# Patient Record
Sex: MALE | Race: WHITE | NOT HISPANIC OR LATINO | Employment: OTHER | URBAN - METROPOLITAN AREA
[De-identification: names, ages, dates, MRNs, and addresses within clinical notes are randomized per-mention and may not be internally consistent; named-entity substitution may affect disease eponyms.]

---

## 2019-01-21 LAB — HBA1C MFR BLD HPLC: 5.6 %

## 2019-03-08 ENCOUNTER — TELEPHONE (OUTPATIENT)
Dept: NEUROLOGY | Facility: CLINIC | Age: 84
End: 2019-03-08

## 2019-04-25 ENCOUNTER — CONSULT (OUTPATIENT)
Dept: NEUROLOGY | Facility: CLINIC | Age: 84
End: 2019-04-25
Payer: COMMERCIAL

## 2019-04-25 VITALS
DIASTOLIC BLOOD PRESSURE: 65 MMHG | SYSTOLIC BLOOD PRESSURE: 152 MMHG | HEART RATE: 60 BPM | HEIGHT: 68 IN | BODY MASS INDEX: 30.31 KG/M2 | WEIGHT: 200 LBS

## 2019-04-25 DIAGNOSIS — G25.0 TREMOR, ESSENTIAL: Primary | ICD-10-CM

## 2019-04-25 PROBLEM — M19.039 LOCALIZED PRIMARY OSTEOARTHRITIS OF WRIST: Status: ACTIVE | Noted: 2019-04-25

## 2019-04-25 PROBLEM — G56.00 CARPAL TUNNEL SYNDROME: Status: ACTIVE | Noted: 2017-10-05

## 2019-04-25 PROBLEM — M19.012 PRIMARY OSTEOARTHRITIS OF LEFT SHOULDER: Status: ACTIVE | Noted: 2019-04-25

## 2019-04-25 PROBLEM — M77.10 LATERAL EPICONDYLITIS: Status: ACTIVE | Noted: 2019-04-25

## 2019-04-25 PROBLEM — I10 HYPERTENSION: Status: ACTIVE | Noted: 2019-04-25

## 2019-04-25 PROBLEM — M47.817 LUMBOSACRAL SPONDYLOSIS WITHOUT MYELOPATHY: Status: ACTIVE | Noted: 2019-04-25

## 2019-04-25 PROBLEM — G56.20 CUBITAL TUNNEL SYNDROME: Status: ACTIVE | Noted: 2017-10-05

## 2019-04-25 PROBLEM — J45.909 ASTHMA: Status: ACTIVE | Noted: 2019-04-25

## 2019-04-25 PROCEDURE — 99205 OFFICE O/P NEW HI 60 MIN: CPT | Performed by: PSYCHIATRY & NEUROLOGY

## 2019-04-25 RX ORDER — CHLORTHALIDONE 25 MG/1
25 TABLET ORAL DAILY
COMMUNITY
Start: 2019-04-21 | End: 2021-07-02

## 2019-04-25 RX ORDER — ASCORBIC ACID 1000 MG
200 TABLET ORAL DAILY
Status: ON HOLD | COMMUNITY
End: 2021-07-17 | Stop reason: CLARIF

## 2019-04-25 RX ORDER — CLONIDINE HYDROCHLORIDE 0.1 MG/1
0.1 TABLET ORAL
COMMUNITY
Start: 2019-02-22 | End: 2021-08-06 | Stop reason: HOSPADM

## 2019-04-25 RX ORDER — OXYCODONE HYDROCHLORIDE 10 MG/1
10 TABLET ORAL EVERY 4 HOURS PRN
COMMUNITY
Start: 2019-02-05

## 2019-04-25 RX ORDER — AMOXICILLIN 500 MG/1
CAPSULE ORAL
COMMUNITY
Start: 2019-02-05 | End: 2019-07-31 | Stop reason: ALTCHOICE

## 2019-04-25 RX ORDER — CYANOCOBALAMIN (VITAMIN B-12) 500 MCG
800 LOZENGE ORAL DAILY
Status: ON HOLD | COMMUNITY
End: 2021-07-17 | Stop reason: CLARIF

## 2019-04-25 RX ORDER — SIMVASTATIN 10 MG
10 TABLET ORAL
COMMUNITY
Start: 2019-03-31

## 2019-04-25 RX ORDER — ECHINACEA 400 MG
1 CAPSULE ORAL DAILY
COMMUNITY

## 2019-04-25 RX ORDER — LEVOTHYROXINE SODIUM 0.07 MG/1
75 TABLET ORAL DAILY
COMMUNITY

## 2019-04-25 RX ORDER — FUROSEMIDE 20 MG/1
20 TABLET ORAL DAILY
COMMUNITY
Start: 2019-03-31 | End: 2021-07-17 | Stop reason: HOSPADM

## 2019-04-25 RX ORDER — AMLODIPINE BESYLATE 10 MG/1
10 TABLET ORAL DAILY
COMMUNITY
Start: 2019-04-21 | End: 2021-08-06 | Stop reason: HOSPADM

## 2019-04-25 RX ORDER — CARVEDILOL 6.25 MG/1
6.25 TABLET ORAL 2 TIMES DAILY WITH MEALS
COMMUNITY
Start: 2019-04-23 | End: 2021-08-06 | Stop reason: HOSPADM

## 2019-04-25 RX ORDER — AMPICILLIN TRIHYDRATE 250 MG
600 CAPSULE ORAL
Status: ON HOLD | COMMUNITY
Start: 2010-06-22 | End: 2021-07-17 | Stop reason: CLARIF

## 2019-04-25 RX ORDER — FLUTICASONE PROPIONATE 250 UG/1
POWDER, METERED RESPIRATORY (INHALATION)
COMMUNITY
Start: 2019-04-11 | End: 2021-07-17 | Stop reason: HOSPADM

## 2019-04-25 RX ORDER — PRIMIDONE 50 MG/1
TABLET ORAL
Refills: 0 | COMMUNITY
Start: 2019-03-20 | End: 2019-06-04 | Stop reason: DRUGHIGH

## 2019-04-26 ENCOUNTER — TELEPHONE (OUTPATIENT)
Dept: NEUROLOGY | Facility: CLINIC | Age: 84
End: 2019-04-26

## 2019-04-26 DIAGNOSIS — G25.0 TREMOR, ESSENTIAL: Primary | ICD-10-CM

## 2019-04-26 RX ORDER — PRIMIDONE 50 MG/1
250 TABLET ORAL EVERY 12 HOURS SCHEDULED
Qty: 150 TABLET | Refills: 0 | Status: SHIPPED | OUTPATIENT
Start: 2019-04-26 | End: 2019-06-04 | Stop reason: DRUGHIGH

## 2019-05-02 ENCOUNTER — EVALUATION (OUTPATIENT)
Dept: OCCUPATIONAL THERAPY | Facility: REHABILITATION | Age: 84
End: 2019-05-02
Payer: COMMERCIAL

## 2019-05-02 DIAGNOSIS — G25.0 TREMOR, ESSENTIAL: Primary | ICD-10-CM

## 2019-05-02 PROCEDURE — 97165 OT EVAL LOW COMPLEX 30 MIN: CPT | Performed by: OCCUPATIONAL THERAPIST

## 2019-05-13 ENCOUNTER — HOSPITAL ENCOUNTER (OUTPATIENT)
Dept: RADIOLOGY | Facility: HOSPITAL | Age: 84
Discharge: HOME | End: 2019-05-13
Attending: PSYCHIATRY & NEUROLOGY
Payer: COMMERCIAL

## 2019-05-13 DIAGNOSIS — G25.0 TREMOR, ESSENTIAL: ICD-10-CM

## 2019-05-13 PROCEDURE — 70551 MRI BRAIN STEM W/O DYE: CPT

## 2019-05-17 ENCOUNTER — TELEPHONE (OUTPATIENT)
Dept: NEUROLOGY | Facility: CLINIC | Age: 84
End: 2019-05-17

## 2019-05-17 DIAGNOSIS — G25.0 TREMOR, ESSENTIAL: Primary | ICD-10-CM

## 2019-05-18 RX ORDER — GABAPENTIN 100 MG/1
200 CAPSULE ORAL 3 TIMES DAILY
Qty: 180 CAPSULE | Refills: 3 | Status: SHIPPED | OUTPATIENT
Start: 2019-05-18 | End: 2020-04-24

## 2019-05-30 ENCOUNTER — TELEPHONE (OUTPATIENT)
Dept: NEUROLOGY | Facility: CLINIC | Age: 84
End: 2019-05-30

## 2019-05-30 DIAGNOSIS — G25.0 TREMOR, ESSENTIAL: Primary | ICD-10-CM

## 2019-06-04 RX ORDER — PRIMIDONE 250 MG/1
250 TABLET ORAL EVERY 12 HOURS SCHEDULED
Qty: 60 TABLET | Refills: 3 | Status: SHIPPED | OUTPATIENT
Start: 2019-06-04 | End: 2019-06-07 | Stop reason: SDUPTHER

## 2019-06-07 DIAGNOSIS — G25.0 TREMOR, ESSENTIAL: ICD-10-CM

## 2019-06-07 RX ORDER — PRIMIDONE 250 MG/1
250 TABLET ORAL EVERY 12 HOURS SCHEDULED
Qty: 180 TABLET | Refills: 3 | Status: SHIPPED | OUTPATIENT
Start: 2019-06-07 | End: 2019-07-31 | Stop reason: SDUPTHER

## 2019-06-26 ENCOUNTER — EVALUATION (OUTPATIENT)
Dept: OCCUPATIONAL THERAPY | Facility: REHABILITATION | Age: 84
End: 2019-06-26
Payer: COMMERCIAL

## 2019-06-26 DIAGNOSIS — G25.0 TREMOR, ESSENTIAL: Primary | ICD-10-CM

## 2019-06-26 PROCEDURE — 97168 OT RE-EVAL EST PLAN CARE: CPT | Performed by: OCCUPATIONAL THERAPIST

## 2019-07-02 ENCOUNTER — OFFICE VISIT (OUTPATIENT)
Dept: OCCUPATIONAL THERAPY | Facility: REHABILITATION | Age: 84
End: 2019-07-02
Payer: COMMERCIAL

## 2019-07-02 DIAGNOSIS — G25.0 TREMOR, ESSENTIAL: Primary | ICD-10-CM

## 2019-07-02 PROCEDURE — 97112 NEUROMUSCULAR REEDUCATION: CPT

## 2019-07-02 PROCEDURE — 97150 GROUP THERAPEUTIC PROCEDURES: CPT

## 2019-07-02 NOTE — PROGRESS NOTES
Daily Note     Today's date: 2019  Patient name: Hugh Olvera  : 1935  MRN: [id number]  Referring provider: Shelby Ngo MD  Dx:   Encounter Diagnosis   Name Primary?  Tremor, essential Yes       Start Time: 845  Stop Time: 945  Total time in clinic (min): 60 minutes    Subjective: "I shoot my gun with my right hand and support it with my left"  Objective: See treatment diary below  Time trials utilizing 12 coins from right to left Q,D,N,P left hand followed by right hand  Left hand timed with number of coins retrieved in 30 sec, right hand timed with amount f time it took to retrive coins  Dowel placement using left hand w/HG followed by peg placement using right hand w/hand to target demands  Assessment: Time trials using coins L-1)9coins/30sec, 2), 3), 4)10/30,   Increased tremor to left UE noted with task  R- 1)12coins/25sec, 2), 3), 4) 5)   Mod droppage noted for dowel placement due to increase in BLUE tremors  Pt reported frequent droppage when loading bullets using left hand  ADRIAN recommended finger  for D1&2 to improve grasp and decrease droppage and frustration during task  Tolerated treatment well  Patient would benefit from continued OT  Plan: Continued skilled OT per POC focusing on carryover with finger  for increased engagement in meaningful occupation  INTERVENTION COMMENTS:  Diagnosis: Tremor, essential  Precautions: N/A  FOTO: 71, with 0% limitation in HAnd function and 50% limitation in UE function    76, with 50% limitation in UE function and 10% limitation in Hand function  Insurance: Payor: Robert Mendez  REP / Plan: [facility] REP / Product Type: Medicare PPO /   2 of 10 visits, PN due 2019:  [id number]:1   [id number]

## 2019-07-03 ENCOUNTER — OFFICE VISIT (OUTPATIENT)
Dept: OCCUPATIONAL THERAPY | Facility: REHABILITATION | Age: 84
End: 2019-07-03
Payer: COMMERCIAL

## 2019-07-03 DIAGNOSIS — G25.0 TREMOR, ESSENTIAL: Primary | ICD-10-CM

## 2019-07-03 PROCEDURE — 97110 THERAPEUTIC EXERCISES: CPT

## 2019-07-03 PROCEDURE — 97112 NEUROMUSCULAR REEDUCATION: CPT

## 2019-07-03 NOTE — PROGRESS NOTES
Daily Note     Today's date: 7/3/2019  Patient name: Garrison Thompson  : 1935  MRN: [id number]  Referring provider: Annalisa Mckeon MD  Dx: No diagnosis found

## 2019-07-03 NOTE — PROGRESS NOTES
Daily Note     Today's date: 7/3/2019  Patient name: Evan Crump  : 1935  MRN: [id number]  Referring provider: Fam Hancock MD  Dx:   Encounter Diagnosis   Name Primary?  Tremor, essential Yes                  Subjective: Every time I try to load my guns, I spill the ammunition on the ground        Objective: See treatment diary below  Supine therapeutic exercise utilizing #3 Tbar transitioned to #4--x3 sets of 15 reps of shoulder FF, chest press, and prograde/retrograde circumduction with focus on increased B/L shoulder strength/stability, muscle endurance, and B/L coordination  Simulated barrel loading--Pt retrieved small pegs utilizing L hand for placement into cylindrical containers  Activity advancement by donning 1 lb wrist weight to Pts LUE for tremor reduction  Activity with focus on FMC/prehension and hand to target accuracy  Donned K-Tape with helical technique to Pts LUE for increased proprioception      Assessment: Tolerated treatment well  Patient would benefit from continued OT  Pt self report of L shoulder pain following Tbar exercises 2* to PMH of rotator cuff injury  Pt demonstrated Mod difficulty with simulated barrel loading 2* to decreased FMC/prehension, decreased hand to target accuracy, and LUE tremors evident of frequent droppage  Pt demonstrated a decrease in LUE tremors with added wrist weight evident of less droppage during activity  Plan: Continued skilled OT per POC    INTERVENTION COMMENTS:  Diagnosis: Tremor, essential  Precautions: N/A  FOTO: 71, with 0% limitation in HAnd function and 50% limitation in UE function    76, with 50% limitation in UE function and 10% limitation in Hand function  Insurance: Payor: TRISTAN ESTRELLA REP / Plan: [facility] RE[facility] / Product Type: Medicare PPO /   3 of 10 visits, PN due 2019

## 2019-07-08 ENCOUNTER — TELEPHONE (OUTPATIENT)
Dept: NEUROLOGY | Facility: CLINIC | Age: 84
End: 2019-07-08

## 2019-07-08 NOTE — TELEPHONE ENCOUNTER
So the primidone  250mg BID is not helping very well  The reason we hesitated to start topiramate was because of his sulfa allergy and topiramate is related to the sulfa-class drugs  Even through there is no direct relation, but I wanted to make him aware of the risk potentially  If he's still willing, we could do low doses of it for longer times to see if he has any reaction / benefit  Please let me know  Alternative is Sinemet trial for possible dystonia and abandoning the essential tremor treatment route

## 2019-07-08 NOTE — TELEPHONE ENCOUNTER
Spoke with the patient regarding lab results  He made verbal understanding and will stop on magnesium supplements  He stated that he is currently on primidone and not working for him  Patient wanted to know if he could get off of primidone and try topamax? He would like to see if he can get a 50mg qith 30 days supply   Please review and assist

## 2019-07-10 ENCOUNTER — OFFICE VISIT (OUTPATIENT)
Dept: OCCUPATIONAL THERAPY | Facility: REHABILITATION | Age: 84
End: 2019-07-10
Payer: COMMERCIAL

## 2019-07-10 DIAGNOSIS — G25.0 TREMOR, ESSENTIAL: Primary | ICD-10-CM

## 2019-07-10 PROCEDURE — 97112 NEUROMUSCULAR REEDUCATION: CPT | Performed by: OCCUPATIONAL THERAPIST

## 2019-07-10 PROCEDURE — 97535 SELF CARE MNGMENT TRAINING: CPT | Performed by: OCCUPATIONAL THERAPIST

## 2019-07-10 NOTE — TELEPHONE ENCOUNTER
Spoke with the patient he said he will think about it and wait for patient next appointment with Dr Clance Runner  7/31/19 and discuss medication options

## 2019-07-10 NOTE — PROGRESS NOTES
Daily Note     Today's date: 7/10/2019  Patient name: Stacy Baires  : 1935  MRN: [id number]  Referring provider: Mikal Rivero MD  Dx: No diagnosis found  Subjective: "I forgot my glasses in the car  You'll have to help me find the letters and numbers "      Objective: See treatment drescription below    In stance neuro re-ed---writing alphabet in air x2 utilizing #4 Tbar with focus on increased BUE strength/stability, muscle endurance, and B/L coordination  Functional reach crossing midline for marble retrieval utilizing resistive clothespin for placement onto alternating letters and numerals (trail making) with focus on B/L intrinsic  strength, FMC/prehension, and shoulder forward functional reach crossing midline  Pt retrieved rubber bands crossing midline for placement onto geoboard x3 designs with focus on intrinsic strength, hand to target accuracy, B/L coordination, forward functional reach and FMC/prehension  Assessment: Tolerated treatment well  Patient would benefit from continued OT    Pt performed Tbar neuro re-ed below 90* shoulder forward flexion 2* PMH of rotator cuff injuries/surgeries--Pt demonstrated no difficulty during activity  Pt demonstrated Mod difficulty with power web activity 2* decreased hand to target accuracy and decreased FMC/prehension evident of 50% droppage  OTS with requirements to modify task 2* Pt not bringing glasses to tx session  Pt completed 3/3 geoboard designs correctly requiring frequent verbal cues 2* Mod difficulties design replication  Plan: Continue per plan of care  INTERVENTION COMMENTS:  Diagnosis: Tremor, essential  Precautions: N/A  FOTO: 71, with 0% limitation in HAnd function and 50% limitation in UE function    76, with 50% limitation in UE function and 10% limitation in Hand function  Insurance: Payor: [employer]  REP / Plan: Tristan Manley [employer]  [address] REP / Product Type: Medicare PPO /   4 of 10 visits, PN due 07/26/2019    Daily note by CHARISMA Castillo under supervision of Medina Louis MS, OTR/L

## 2019-07-12 ENCOUNTER — OFFICE VISIT (OUTPATIENT)
Dept: OCCUPATIONAL THERAPY | Facility: REHABILITATION | Age: 84
End: 2019-07-12
Payer: COMMERCIAL

## 2019-07-12 DIAGNOSIS — G25.0 TREMOR, ESSENTIAL: Primary | ICD-10-CM

## 2019-07-12 PROCEDURE — 97112 NEUROMUSCULAR REEDUCATION: CPT | Performed by: OCCUPATIONAL THERAPIST

## 2019-07-12 PROCEDURE — 97535 SELF CARE MNGMENT TRAINING: CPT | Performed by: OCCUPATIONAL THERAPIST

## 2019-07-12 NOTE — PROGRESS NOTES
Daily Note     Today's date: 2019  Patient name: German Abrams  : 1935  MRN: [id number]  Referring provider: Juni Aggarwal MD  Dx: No diagnosis found  Subjective: "I'm going to reload ammunition this weekend to see if my hands are improving "      Objective: See treatment description below    OTS educated Pt on Hand TheraPutty exercises with handout with visual cues provided and physical demonstration to improve understanding and carryover to home environment  Cholo Gutierrez in quadruped on mat with functional reach crossing midline for BB grasp followed by release of BB to various targets located on floor x3  Activity with focus on improved proximal strength/stability, grasp/release abilities, hand to target accuracy and increased proprioceptive input for tremor reduction  Seated neuro re-ed utilizing elevated wedge--Pt crossed midline for retrieval of foam blocks for placement onto board x1 mosaic designs  Activity with focus on FMC/prehension, hand to target accuracy, and forward functional reach  Assessment: Tolerated treatment well  Patient would benefit from continued OT    Pt demonstrated good understanding and carryover of TheraPutty exercises to successfully perform in home environment  Pt self report of minimal benefits of tremor reduction with WBearing activity--Pt demonstrated Min difficulty with BB toss 2* decreased ROM of LUE evident due to PMH of rotator cuff injuries/surgeries  Pt demonstrated Min difficulty with mosaic activity 2* decreased FMC/prehension and tremors L>R evident of x4 droppage with L hand within 5 minutes of activity completion  Plan: Continue per plan of care  INTERVENTION COMMENTS:  Diagnosis: Tremor, essential  Precautions: N/A  FOTO: 71, with 0% limitation in HAnd function and 50% limitation in UE function    76, with 50% limitation in UE function and 10% limitation in Hand function  Insurance: Payor: Pauline Moore [facility] REP / Plan: Simona Goodness [employer] [address] REP / Product Type: Medicare PPO /   5 of 10 visits, PN due 07/26/2019    Daily note by Ledy Redmond, OTS under supervision of Cher Johnson, MS, OTR/L

## 2019-07-17 ENCOUNTER — OFFICE VISIT (OUTPATIENT)
Dept: OCCUPATIONAL THERAPY | Facility: REHABILITATION | Age: 84
End: 2019-07-17
Payer: COMMERCIAL

## 2019-07-17 DIAGNOSIS — G25.0 TREMOR, ESSENTIAL: Primary | ICD-10-CM

## 2019-07-17 PROCEDURE — 97535 SELF CARE MNGMENT TRAINING: CPT | Performed by: OCCUPATIONAL THERAPIST

## 2019-07-17 PROCEDURE — 97112 NEUROMUSCULAR REEDUCATION: CPT | Performed by: OCCUPATIONAL THERAPIST

## 2019-07-17 NOTE — PROGRESS NOTES
Daily Note     Today's date: 2019  Patient name: Mariela Martínez  : 1935  MRN: [id number]  Referring provider: Trudy Partida MD  Dx: No diagnosis found  Subjective: "I've only seen a 5% improvement and I think it is going to stay that way  I'm not sure how much longer I want to pay the $70 co-pay "      Objective: See treatment description below    Nuts and bolts workstation with focus on FMC/prehension with vision occluded and B/L coordination  [place] (array of quarters, nickels, dimes, and pennies) retrieval with palm to digit translation for placement onto table  OTS advanced activity to retrieval of pegs utilizing tweezers for placement onto coins and beads onto pegs  Activity transitioned to retrieval of beads utilizing pincer grasp for placement with palm to digit translation into container  Activities with focus on FMC/prehension, hand to target accuracy, and in-hand manipulation  Pt retrieved bead for placement onto shoelace utilizing tweezers with focus on refined FMC/prehension, hand to target precision, and B/L coordination  Assessment: Tolerated treatment well  Patient would benefit from continued OT    Pt demonstrated Mod difficulty with workstation 2* decreased sustained pincer grasp evident of frequent droppage and extra time required for successful task completion---Pt's functional performance improved with glasses donned  Pt demonstrated Min difficulty with palm to digit translation while holding coins, peg and bead retrieval/placement while utilizing tweezers, and decreased proprioception evident of knocking over x3 pegs and x5 droppage---Pt's functional performance and tremors improved while stabilizing palm on table for added proprioceptive input  Pt demonstrated Max difficulty with stringing beads onto shoelace 2* decreased refined FMC/prehension and tremors evident of frequent droppage  Plan: Continue per plan of care        INTERVENTION COMMENTS:  Diagnosis: Tremor, essential  Precautions: N/A  FOTO: 71, with 0% limitation in HAnd function and 50% limitation in UE function    76, with 50% limitation in UE function and 10% limitation in Hand function  Insurance: Payor: [employer]  REP / Plan: Cristian Gomez [facility] REP / Product Type: Medicare PPO /   6 of 10 visits, PN due 07/26/2019    Daily note by CHARISMA Levy under supervision of Cher Johnson MS, OTR/L

## 2019-07-19 ENCOUNTER — OFFICE VISIT (OUTPATIENT)
Dept: OCCUPATIONAL THERAPY | Facility: REHABILITATION | Age: 84
End: 2019-07-19
Payer: COMMERCIAL

## 2019-07-19 DIAGNOSIS — G25.0 TREMOR, ESSENTIAL: Primary | ICD-10-CM

## 2019-07-19 PROCEDURE — 97112 NEUROMUSCULAR REEDUCATION: CPT | Performed by: OCCUPATIONAL THERAPIST

## 2019-07-19 PROCEDURE — 97535 SELF CARE MNGMENT TRAINING: CPT | Performed by: OCCUPATIONAL THERAPIST

## 2019-07-19 NOTE — PROGRESS NOTES
Daily Note     Today's date: 2019  Patient name: Mariela Martínez  : 1935  MRN: [id number]  Referring provider: Trudy Partida MD  Dx: No diagnosis found  Subjective: "I see some improvement with the weight "      Objective: See treatment description below    In stance--Pt retrieved pegs crossing midline for placement onto board with OH reach x1 designs utilizing LUE  OTS added 1 lb weight to Pt's LUE for tremor reduction  Pt removed pegs from board with RUE  Activity with focus on refined FMC/prehension, hand to target accuracy, and OH functional reach  Delene Gosling in quadruped--Crossing midline retrieval of squibs for placement onto wall  Activity with focus on increased FMC/prehension, functional reach, increased proprioceptive input, and tremor reduction  Ball bounce with Shalini Area transitioned to bouncing from RUE to LUE with focus on grasp/release abilities, increased automaticity, and B/L coordination  Pt retrieved Veronica Yamilet blocks crossing midline to build tower  Activity with focus on FMC/prehension, B/L coordination, and hand to target accuracy  Assessment: Tolerated treatment well  Patient would benefit from continued OT    Pt demonstrated Mod difficulty with peg activity 2* tremor and decreased refined FMC/prehension evident of x5 droppage--Tremor reduction evident with weight donned on LUE resulting in less droppage and successful task completion  Pt demonstrated Min difficulty with Jenga tower 2* tremor L > R evident of knocking over blocks and decreasing hand to target accuracy--Pt utilized RUE to keep tower from falling throughout duration of activity  Plan: Continue per plan of care  INTERVENTION COMMENTS:  Diagnosis: Tremor, essential  Precautions: N/A  FOTO: 71, with 0% limitation in HAnd function and 50% limitation in UE function    76, with 50% limitation in UE function and 10% limitation in Hand function  Insurance: [facility] / Plan: 's [employer] ANIYA PFAMY [facility] REP / Product Type: Medicare PPO /   6 of 10 visits, PN due 07/26/2019    Daily note by CHARISMA Reyna under supervision of Cleveland Davis MS, OTR/L

## 2019-07-22 ENCOUNTER — OFFICE VISIT (OUTPATIENT)
Dept: OCCUPATIONAL THERAPY | Facility: REHABILITATION | Age: 84
End: 2019-07-22
Payer: COMMERCIAL

## 2019-07-22 DIAGNOSIS — G25.0 TREMOR, ESSENTIAL: Primary | ICD-10-CM

## 2019-07-22 PROCEDURE — 97112 NEUROMUSCULAR REEDUCATION: CPT | Performed by: OCCUPATIONAL THERAPIST

## 2019-07-22 PROCEDURE — 97535 SELF CARE MNGMENT TRAINING: CPT | Performed by: OCCUPATIONAL THERAPIST

## 2019-07-22 NOTE — PROGRESS NOTES
Daily Note     Today's date: 2019  Patient name: Trevon Lofton  : 1935  MRN: [id number]  Referring provider: Kartik Donald MD  Dx: No diagnosis found  Subjective: "You should see when I write  It's a disaster "      Objective: See treatment description below    Line tangles with sustained OH reach focusing on improved proximal strength/stability, refined prehension, and improved motor control  OTS educated Pt on stabilizing body and forearm on mirror for increased proprioceptive input and tremor reduction  OTS added 1 lb weight to RUE for additional tremor reduction  [address] with focus on intrinsic hand strength, FMC/prehension, and B/L coordination  OTS added 1 lb weight to LUE for increased stability and tremor reduction  Assessment: Tolerated treatment well  Patient would benefit from continued OT    Tremor evident throughout full duration of tx session-- added wrist weight and increased proprioceptive input did not improve tremor during line tangle or weaving loom activity affecting overall functional performance  Plan: Continue per plan of care  INTERVENTION COMMENTS:  Diagnosis: Tremor, essential  Precautions: N/A  FOTO: 71, with 0% limitation in HAnd function and 50% limitation in UE function    76, with 50% limitation in UE function and 10% limitation in Hand function  Insurance: Payor: [employer]  REP / Plan: Lucia Parker [facility] REP / Product Type: Medicare PPO /   7 of 10 visits, PN due 2019    Daily note by CHARISMA Scott under supervision of Benita Villaseñor MS, OTR/L

## 2019-07-24 ENCOUNTER — EVALUATION (OUTPATIENT)
Dept: OCCUPATIONAL THERAPY | Facility: REHABILITATION | Age: 84
End: 2019-07-24
Payer: COMMERCIAL

## 2019-07-24 DIAGNOSIS — G25.0 TREMOR, ESSENTIAL: Primary | ICD-10-CM

## 2019-07-24 PROCEDURE — 97112 NEUROMUSCULAR REEDUCATION: CPT | Performed by: OCCUPATIONAL THERAPIST

## 2019-07-24 PROCEDURE — 97535 SELF CARE MNGMENT TRAINING: CPT | Performed by: OCCUPATIONAL THERAPIST

## 2019-07-24 NOTE — PROGRESS NOTES
OCCUPATIONAL THERAPY D/C SUMMARY:    07/24/2019  Liz Vega  1935  [id number]  Nic Helms MD  No diagnosis found  Subjective Evaluation    Quality of life: good          "I've only seen a 3-5% improvement  Sometimes I see it  Sometimes I don't "    PATIENT GOAL: "Shoot better "    HISTORY OF PRESENT ILLNESS:     Pt is a 82 y o  male who was referred to Occupational Therapy s/p tremors in BUE L>R  Per Chart review, Pt has been evaluated by Neurology at [facility] before with Dr Luiz Casper and Dr Daljit Elliott  Pt started having tremors in his mid 40s in the R hand  Pt noticed it while he was trying to write - he would be able to touch his R hand with his L and then the tremors were better  Pt was diagnosed as benign essential tremor but no clear benefit on primidone  Pt feels tremors worsen when he holds onto objects  Tremor interferes with his sport hunting  He was attempted on propranolol and there was discussion about focused ultrasound therapy for treatment of medication-refractory essential tremor as patient was not interested in deep brain stimulation at the time  He was not started on topiramate due to his reported history of severe sulfa allergy  Pt currently weaning from primidone to assess for any benefit being masked  Pt with self-report of significant hx of essential tremor in sister  Pt reports continuing to notice B/L L>R tremors  Pt with self- report of having difficulty with holding a cup when he  the cup - keeping the 3rd-5th fingers straight avoids triggering tremor  When he attempts to shoot with his handgun, his hands will shake more with the added   Pt reports utilizing two hands to control his mouse on the computer  Pt also reports demo difficulties with B/L coordination and when attempting to utilize screwdriver  Pt lives in a [facility] style house with wife  Pt performs all ADLs/IADLs independently at this time    Pt is a retired  for [address] [place] [address] retired in [address] [facility]   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  Pt continues the  roles with no difficulties evident  Pt continues participating in [employer] shooting sport  PMH: No past medical history on file  Pain Levels:     Restin    With Activity:  0    Objective     Functional Assessment        Comments  See impairment section for further details  Impairment Observations:  1  Tremors in BUE L>R  2  Decreased FMC/prehension BUE L>R  3  Decreased /pinch strength in L hand/intrinsics  4  Decreased B/L L>R automaticity      Dynamometer Position #2:   R: 94    92  86  L: 82    73  80    R hand dominant    Action:  3 point pinch: R 12  and L 9  R 17 5, L 16 5  Yarelis Cunning Yarelis Cunning R 15 5, L17  2 point pinch: R 17 5 and L 9 5  Yarelis Cunning Yarelis Cunning R 12 5, L 9 5    R 15, L11  Lateral pinch: R 11 5 and L 9 5  Yarelis Cunning Yarelis Cunning R 12, L 9 5    R 14, L 14 5    9-hole Peg Test: RUE: 27 24 seconds with x1 droppage, LUE: 57 96 seconds with x and significant tremors evident    R 24 89 seconds, L 52 47 seconds x 3 droppage and significant tremors evident   R: 28 85 seconds, L: 53 14 seconds x1 droppage with tremor evident     Myofilaments: B/L 3 61     B/L AROM:  Shoulder elevation: B/L full  Shoulder FF: B/L full  Shoulder ABD: B/L full  ER/IR: B/L full  Elbow ext/flex: B/L full  Sup: B/L near full  Pron: B/L full  Wrist flex/ext: B/L full  Composite: B/L full  Hook: B/L full  Opposition: R intact, tremors affecting functional coordination of L hand  Finger to nose: B/L intact  Dysdiadochokinesia: B/L intact     Pt with PMH of RTC surgery in 2016  MMT: B/L 4/5 2* B/L RTC injuries    B/L 4+/5 2* B/L RTC injuries with surgical intervention    B/L 5/5 except for R forward flexion 3+/5 2* RTC injuries with surgical itervention        Assessment  Assessment details: See skilled analysis for further details  Skilled Analysis:  Pt is a 82 y o  male referred to Occupational Therapy s/p Tremors in BUE L>R    Pt presents with tremors in BUE L>R, decreased FMC/prehension BUE L>R, decreased /pinch strength in L hand/intrinsics, and decreased B/L L>R automaticity affecting engagement in sporting activities and meaningful occupations  Pt will benefit from skilled Occupational Therapy services 2x/week for 4-6 weeks with focus on neuro re-ed, manual tx, self-care management, and Pt edu on compensatory strategies for tremor reduction for enhanced overall QOL  Pt demonstrates fair + functional progression towards goals in POC evident of improved /pinch strength and FMC/prehension with less droppage  Pt continues to present with tremor L>R affecting functional performance with meaningful occupations and life roles  OTR is D/Cing Pt at this time 2* maximal level reached and goals met  OTR educated Pt on continuing HEP and tremor reduction strategies to maintain progression and for improved functional performance with salient tasks  OTR educated Pt on contacting staff if functional regression occurs  Short Term Goals:  - Pt will increase B/L L>R prehension patterns for improved tripod with utensil management with <20% droppage 4 weeks    MET  - Pt will demo with G carryover of Home Exercise Program to improve functional progression towards goals in Plan of care and for improved functional use of BUE L>R 4 weeks    MET  - Pt will increase automaticity of BUE L>R to 50% for improved grasp release of tabletop items for improved functional performance with salient tasks 4 weeks    PARTIALLY MET  - Pt will increase rate of manipulation for all B/L L>R FM tests for improved functional performance with salient tasks 4 weeks    PARTIALLY MET  - Pt will increase RUE to Mod I and LUE to refined assist with <20% cuing for tabletop tasks for improved functional performance of life roles and salient tasks 4   MET       - Pt will demo with G understanding an carryover of compensatory strategies for BUE tremor reduction x 25% for improved functional performance in sporting activities and meaningful occupations 4 weeks    MET        Long Term Goals:  - Pt will demo with G understanding an carryover of compensatory strategies for BUE tremor reduction x 50% for improved functional performance in sporting activities and meaningful occupations    MET  - Pt will increase automaticity of  BUE L>R to 90% for resumption of B integrative tasks    NOT MET  - Pt will increase LUE strength to 4+/5 and  strength R=L, through the use of strengthening exercises and home program for enhanced overall QOL    PARTIALLY MET  - Pt will increase rate of prehension for all B/L L>R FM tasks for improved use of utensils, writing, ADL fasteners    MET  -  Pt will resume R hand dominance with I status and L hand non-dominance to Mod I for improved functional performance with sporting activities and meaningful occupations    MET      INTERVENTION COMMENTS:  Diagnosis: Tremor, essential  Precautions: N/A  FOTO: 71, with 0% limitation in HAnd function and 50% limitation in UE function    76, with 50% limitation in UE function and 10% limitation in Hand function   70, with 47% limitation in UE function and 5% limitation in hand function  Insurance: Payor: Sudhir Friends / Plan: Yadiel SCHNEIDER  REP / Product Type: Medicare PPO /   8 of 10 visits    D/C Summary by CHARISMA Castillo Cap under supervision of Ronny Velasco MS, OTR/L

## 2019-07-30 NOTE — PROGRESS NOTES
DEPARTMENT OF NEUROLOGICAL SCIENCES  [address] [facility]        RETURN PATIENT NOTE    Patient: Liz Vega  Medical Record Number: # [id number]  YOB: 1935  Date of visit: 7/31/2019    Referring provider: No ref  provider found    ASSESSMENT     Diagnoses for this encounter:  1  Tremor, essential  primidone (MYSOLINE) 50 mg tablet    primidone (MYSOLINE) 250 mg tablet   2  Dystonic tremor       Impression of this 83 yo gentleman with >35 year hx of tremors, with strong family history of tremor in his sister and father, returns today with some reported improvement of the general non-positional aspect of his hand tremors on primidone 250mg BID  His main issue remains the left hand tremor that is positionally dependent - the part where it is triggered on slight finger flexion  This remainder interferes the most with his ability to hold a cup or to load and shoot his sporting pistol  To review, he had an unknown level of response to propranolol and topiramate is not an option due to sulfa allergy  OT session completed and as minimally beneficial to him  It is certainly now more likely that as one aspect of his tremors improved with the primidone, the dystonic tremor of the hand is more clearly revealed as today  Though he still has no clear abnormal posturing, the suspicions mentioned last time may be more likely  We will proceed as below to titrate to maximum primidone per his preference before considering focal injection of botulinum toxin to the finger flexors of the left hand later  Zonisamide is another potential if the primidone does not fully control the essential tremor part  He denies any side effects thus far  PLAN     · Will keep the current 250mg primidone tablets the same  Will increase the primidone 50mg tablets slowly up to a total of 750mg a day   Add on 50mg tablets as such:   · Week 1:  0 tab in AM / 0 5 tab in PM  · Week 2:  0 5 tab twice a day  · Week 3:  0 5 tab in AM / 1 tab in PM   · Week 4:  1 tab twice a day  · Week 5:  1 tab / 1 5 tab  · Week 6:  1 5 tab twice a day  · Week 7:  1 5 tab / 2 tab  · Week 8:  2 tab twice a day   · Week 9:  2 tab / 2 5 tab  · Week 10:  2 5 tab twice a day  (maximum absolute dose,  375mg BID = 750mg)  · Discussed about potential for botulinum toxin injections focally in the L forearm finger flexors to control what may be dystonic tremor if above does not help  · Regarding Deep Brain Stimulation, he is not a candidate at this time given we have not exhausted medical options and since tremors are mixed type  · The patient has been instructed to call us about any new neurological problems or medication side effects  · Return to Clinic in 4 months    A total of 40 minutes were spent face-to-face with this patient, of which 25% was spent on counseling and coordination of care  We discussed the natural history of the patient's condition, differential diagnosis, level of diagnostic certainty, treatment alternatives and their side effects and possible complications  HISTORY OF PRESENT ILLNESS:     Mr Gemini Colvin is a 82 y o  right handed male who [facility] [address] [place] [address] for transfer of care evaluation of tremors  Last visit 4/25/19  The patient was accompanied today  History was obtained from patient and spouse    Interim History  MRI Brain showed old lacunar stroke in the L basal ganglia  He felt unchanged after weaning down primidone  Instructed to uptitrate gabapentin but he could not tolerate beyond 200mg a day from dizziness and imbalance  Per his preference, gabapentin weaned off and restarted primidone 250mg BID  He has been seeing OT  Laboratory workup was normal except for slightly elevated magnesium  As of 7/8/19 he reported primidone was not working as well as he thought, and requested switch to topiramate   He ultimately opted to discuss this further at current visit, given topiramate has potential for sulfa allergy  He was discharged from OT on 7/24/19 with only a subjective "3-5%" improvement  He now recalls his father and his sister both having tremors as well  He tells me today that when he was off the primidone he felt his tremors worsened; now he is back on primidone  He feels his left hand does not shake when the fingers are flattened, but triggered more when he attempts to curl his fingers around and object to grasp it  He has developed another way of altering his finger position to grasp his bullets that seems to reduce the amplitude of tremor  INITIAL HISTORY  Main bothersome neurological symptoms today are:   1  Bilateral hand tremor  Per Chart review, he has been evaluated by Neurology at [address] before with Dr Roz Spence and Dr Flip Ball  He started having tremors in his mid 45s in the R hand  He noticed it while he was trying to write - he would be able to touch his R hand with his L and then the tremors were better  He has difficulty with holding a cup when he  the cup - keeping the 3rd-5th fingers straight avoids triggering tremor  When he attempts to shoot with his handgun, his hands will shake more with the added   He uses two hands to control his mouse on the computer  Working with a screwdriver is very difficult as well as fine motor movements  One Verl Landry) sister with similar type of tremor and taking primidone  He feels his tremors have overall progressed over the years, moreso in the L than the R hand  He was diagnosed as benign essential tremor but no clear benefit on primidone  Pt feels tremors worsen when he holds onto objects  Tremor interferes with his sport hunting  He was attempted on propranolol and there was discussion about focused ultrasound therapy for treatment of medication-refractory essential tremor as patient was not interested in deep brain stimulation at the time   He was not started on topiramate due to his reported history of severe sulfa allergy  He drinks decaffeinated coffee and tea  He does not feel he was on propranolol for very long and he does not know what was the maximum dose or effect he had  He denies injury to the hand or wrists  He had a "couple of head injuries" with a skull fracture from slipping in his fish pond in 2014, another when he sustained a frontal injury from hitting his head on a tailgate of truck       REVIEW OF PAST MEDICAL, SOCIAL AND FAMILY HISTORY:  This is the list of problems as per our Medical Records:    Patient Active Problem List    Diagnosis Date Noted    Asthma 04/25/2019    Hypertension 04/25/2019    Lateral epicondylitis 04/25/2019    Localized primary osteoarthritis of wrist 04/25/2019    Lumbosacral spondylosis without myelopathy 04/25/2019    Primary osteoarthritis of left shoulder 04/25/2019    Carpal tunnel syndrome 10/05/2017    Cubital tunnel syndrome 10/05/2017    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    Neuropathy of both feet 11/20/2015    Benign essential hypertension 06/22/2010    ED (erectile dysfunction) of organic origin 06/22/2010    Lumbar spondylosis with myelopathy 07/28/2008    Spinal stenosis of lumbar region 04/23/2008    Low back pain 04/03/2008     Allergies   Allergen Reactions    Sulfa Antibiotics Other (See Comments)     pancreatitis          Outpatient Encounter Medications as of 7/31/2019   Medication Sig Dispense Refill    amLODIPine (NORVASC) 10 mg tablet Take 10 mg by mouth daily       ASPIRIN 81 PO Take 81 mg by mouth daily       carvedilol (COREG) 6 25 mg tablet Take 6 25 mg by mouth 2 (two) times a day with meals       chlorthalidone 25 mg tablet Take 25 mg by mouth daily       Cholecalciferol (VITAMIN D3 PO) Take 4,000 tablets by mouth daily       cloNIDine (CATAPRES) 0 1 mg tablet Take 0 1 mg by mouth once       Coenzyme Q10 (CO Q 10) 10 MG CAPS Take 200 mg by mouth 2 (two) times a day       Flaxseed, Linseed, (FLAXSEED OIL) 1000 MG CAPS Take 1 capsule by mouth daily       FLOVENT DISKUS 250 MCG/BLIST AEPB       furosemide (LASIX) 20 mg tablet Take 20 mg by mouth daily       levothyroxine 75 mcg tablet Take 75 mcg by mouth daily       Multiple Vitamin (MULTI VITAMIN DAILY PO) Take 1 capsule by mouth daily       Multiple Vitamins-Minerals (PRESERVISION AREDS PO) Take 1 tablet by mouth daily       Omega-3 Fatty Acids (FISH OIL PO) Take 5,000 mg by mouth      oxyCODONE (ROXICODONE) 5 mg immediate release tablet       primidone (MYSOLINE) 250 mg tablet Take 1 tablet (250 mg total) by mouth every 12 (twelve) hours 180 tablet 3    Red Yeast Rice 600 MG CAPS Take 600 mg by mouth      Vitamin E 400 units TABS Take 400 Units by mouth daily       [DISCONTINUED] primidone (MYSOLINE) 250 mg tablet Take 1 tablet (250 mg total) by mouth every 12 (twelve) hours 180 tablet 3    gabapentin (NEURONTIN) 100 mg capsule Take 2 capsules (200 mg total) by mouth 3 (three) times a day (Patient not taking: Reported on 7/31/2019) 180 capsule 3    primidone (MYSOLINE) 50 mg tablet Take 2 5 tablets (125 mg total) by mouth 2 (two) times a day taken uptitrated according to instructions, taken in addition to 250mg tablet 450 tablet 3    simvastatin (ZOCOR) 10 mg tablet       [DISCONTINUED] amoxicillin (AMOXIL) 500 mg capsule        No facility-administered encounter medications on file as of 7/31/2019  REVIEW OF SYSTEMS:  The patient has entered data on an intake form regarding present illness, past medical and surgical history, medications, allergies, family and social history, and a full review of 14 systems  I have reviewed this form with the patient, and all the relevant information has been included on this note  The full review of systems was negative except as stated in HPI and below  Constitutional: Negative  Negative for appetite change and fever  HENT: Positive for hearing loss   Negative for tinnitus, trouble swallowing and voice change  Eyes: Negative  Negative for photophobia and pain  Respiratory: Negative  Negative for shortness of breath  Cardiovascular: Negative  Negative for palpitations  Gastrointestinal: Negative  Negative for nausea and vomiting  Endocrine: Negative  Negative for cold intolerance and heat intolerance  Genitourinary: Negative  Negative for dysuria, frequency and urgency  Musculoskeletal: Positive for back pain  Negative for myalgias and neck pain  Pain when walking    Skin: Negative  Negative for rash  Neurological: Positive for tremors (went he stopped primadone, they got worse, when taking premidone they are better, but still continues to have tremors  Completed Occupational Therapy )  Negative for dizziness, seizures, syncope, facial asymmetry, speech difficulty, weakness, light-headedness, numbness and headaches  Hematological: Bruises/bleeds easily  Psychiatric/Behavioral: Negative  Negative for confusion, hallucinations and sleep disturbance  FOCUSED PHYSICAL EXAMINATION:     Vital signs:  /76 (BP Location: Left arm, Patient Position: Sitting, Cuff Size: Large)   Pulse 66   Ht 5' 8" (1 727 m)   Wt 91 9 kg (202 lb 9 6 oz)   BMI 30 81 kg/m²     General:  Well-appearing, well nourished, pleasant patient in no acute distress  Mood and Fund of Knowledge are appropriate  Head:  Normocephalic, atraumatic  Oropharynx and conjunctiva are clear  Speech  No hypophonia, no bradylalia  No scanning speech  Language: Comprehension intact  Neck:  Supple, strong 5/5 forward flexion and retroflexion  Extremities: Range of motion is normal       Cognitive and Mental Exam:  The patient is alert, oriented to self, location, date and situation  Memory is normal to provide accurate details of health history    Cranial Nerves:  CN II:  Direct and consensual light reflexes were equally reactive to light symmetrically    No afferent pupillary defect   Visual fields are full to confrontation  CN III / IV / VI: Extraocular movements were full, with normal pursuit and saccades  CN V:   Facial sensation to light touch was intact  CN VII: Face is symmetric with normal strength  CN VIII: Hearing was assessed using the finger rubs and was normal    CN X:   Palate is up going bilaterally and symmetrically  CN XI:  Neck muscles are strong  CN XII: Tongue protrusion is at midline with normal movements  No dysarthria  Motor:    Tremor:  Left hand worse than R  When he clenches the Left hand, the L wrist will start flex-ext tremor  Activating the 3rd, 4th, and 5th digits appears to amplify the tremor and add finger flex-ext to it  This does not occur as strongly by gripping a cup with the thumb and index finger alone  Dyskinesia: none  Myoclonus: none  Chorea: none  Tics: none      [id number]                Time since last dose:   4/25/19 7/31/19   Speech  0  0   Facial Expression  0 0   Postural Tremor (Right) 2  2   Postural Tremor (Left) 3 With wrist flex-ext, flex-ext of the 3rd, 4th fingers  3 able to be triggered   Kinetic Tremor (Right)  2  2   Kinetic Tremor (Left)  3 3   Rest tremor amplitude RUE 0 0   Rest tremor amplitude LUE 0 0   Rest tremor amplitude RLE 0 0   Rest tremor amplitude LLE 0 0   Lip/Jaw Tremor  0 0   Consistency of tremor 0 0   Finger Taps (Right)   0 -   Finger Taps (Left)  0 -   Hand Movement (Right)  0 -   Hand Movement (Left)   0 -   Pronation/Supination (Right)  0 -   Pronation/Supination (Left)   0 -   Toe Tapping (Right) 0 -   Toe Tapping (Left) 0 -   Leg Agility (Right)  0 -   Leg Agility (Left)   0  -   Rigidity - Neck  1  -   Rigidity - Upper Extremity (Right)  1   -   Rigidity - Upper Extremity (Left)   0  -   Rigidity - Lower Extremity (Right)  0 -   Rigidity - Lower Extremity (Left)   0 -   Arising from Chair   0  0    Gait   0  0   Freezing of Gait 0  0   Postural Stability  -  -   Posture 0  0 Global spontaneity of movement 0  0     -------------------------------------------------------------------------------------    Muscle Strength Right Left  Muscle Strength Right Left   Deltoid 5/5 5/5  Hip Adductors 5/5 5/5   Biceps 5/5 5/5  Hip Abductors 5/5 5/5   Triceps 5/5 5/5  Knee Extensors 5/5 5/5   Wrist Extensors 5/5 5/5  Knee Flexors 5/5 5/5   Wrist Flexors 5/5 5/5  Ankle Extensors 5/5 5/5    5/5 5/5  Ankle Flexors 5/5 5/5   Finger Abductors 5/5 5/5       Hip Flexors 5/5 5/5   Hip Extensors 5/5 5/5     Coordination:  Finger-to-nose-finger: normal except with mild intention tremor bilaterally  Gait:  Normal comprehensive gait evaluation, has normal raising, stance, gait, turns        Reflexes:    Right Left   Biceps 1/4 1/4   Brachioradialis 1/4 1/4   Triceps 1/4 1/4   Knee 1/4 1/4   Ankle 1/4 1/4

## 2019-07-31 ENCOUNTER — OFFICE VISIT (OUTPATIENT)
Dept: NEUROLOGY | Facility: CLINIC | Age: 84
End: 2019-07-31
Payer: COMMERCIAL

## 2019-07-31 VITALS
HEIGHT: 68 IN | SYSTOLIC BLOOD PRESSURE: 170 MMHG | HEART RATE: 66 BPM | BODY MASS INDEX: 30.71 KG/M2 | WEIGHT: 202.6 LBS | DIASTOLIC BLOOD PRESSURE: 76 MMHG

## 2019-07-31 DIAGNOSIS — G25.2 DYSTONIC TREMOR: ICD-10-CM

## 2019-07-31 DIAGNOSIS — G25.0 TREMOR, ESSENTIAL: Primary | ICD-10-CM

## 2019-07-31 PROCEDURE — 99215 OFFICE O/P EST HI 40 MIN: CPT | Performed by: PSYCHIATRY & NEUROLOGY

## 2019-07-31 RX ORDER — PRIMIDONE 250 MG/1
250 TABLET ORAL EVERY 12 HOURS SCHEDULED
Qty: 180 TABLET | Refills: 3 | Status: SHIPPED | OUTPATIENT
Start: 2019-07-31 | End: 2020-04-24

## 2019-07-31 RX ORDER — PRIMIDONE 50 MG/1
125 TABLET ORAL 2 TIMES DAILY
Qty: 450 TABLET | Refills: 3 | Status: SHIPPED | OUTPATIENT
Start: 2019-07-31 | End: 2020-04-24

## 2019-07-31 NOTE — PROGRESS NOTES
Review of Systems   Constitutional: Negative  Negative for appetite change and fever  HENT: Positive for hearing loss  Negative for tinnitus, trouble swallowing and voice change  Eyes: Negative  Negative for photophobia and pain  Respiratory: Negative  Negative for shortness of breath  Cardiovascular: Negative  Negative for palpitations  Gastrointestinal: Negative  Negative for nausea and vomiting  Endocrine: Negative  Negative for cold intolerance and heat intolerance  Genitourinary: Negative  Negative for dysuria, frequency and urgency  Musculoskeletal: Positive for back pain  Negative for myalgias and neck pain  Pain when walking    Skin: Negative  Negative for rash  Neurological: Positive for tremors (went he stopped primadone, they got worse, when taking premidone they are better, but still continues to have tremors  Completed Occupational Therapy )  Negative for dizziness, seizures, syncope, facial asymmetry, speech difficulty, weakness, light-headedness, numbness and headaches  Hematological: Bruises/bleeds easily  Psychiatric/Behavioral: Negative  Negative for confusion, hallucinations and sleep disturbance

## 2019-07-31 NOTE — PATIENT INSTRUCTIONS
· Will keep the current 250mg primidone tablets the same  Will increase the primidone 50mg slowly up to 750mg   · Week 1:  0 tab in AM / 0 5 tab in PM  · Week 2:  0 5 tab twice a day  · Week 3:  0 5 tab in AM / 1 tab in PM   · Week 4:  1 tab twice a day  · Week 5:  1 tab / 1 5 tab  · Week 6:  1 5 tab twice a day  · Week 7:  1 5 tab / 2 tab  · Week 8:  2 tab twice a day   · Week 9:  2 tab / 2 5 tab  · Week 10:  2 5 tab twice a day  (maximum absolute dose,  375mg BID)  · Discussed about potential for botulinum toxin injections focally in the L forearm finger flexors to control what may be dystonic tremor if above does not help  · Discussed about Deep Brain Stimulation, including providing educational materials on the topic  He is not a candidate at this time given we have not exhausted medical options  · The patient has been instructed to call us about any new neurological problems or medication side effects    · Return to Clinic in 4 months

## 2019-07-31 NOTE — LETTER
July 31, 2019     Charu Stoll [place] [phone] [id number]    Patient: Olive Wheeler   YOB: 1935   Date of Visit: 7/31/2019       Dear Dr Esquivel Payment:    Earlier today I saw Mr Roberto Butt for evaluation of his tremors  Below are my notes for this visit for your records and to keep you updated on his health status  If you have questions, please do not hesitate to call me  I look forward to following your patient along with you  Sincerely,        Reggie Degroot MD        CC: No Recipients  Reggie Degroot MD  7/31/2019 10:53 PM  Sign at close encounter  [facility] PATIENT NOTE    Patient: Olive Wheeler  Medical Record Number: # [id number]  YOB: 1935  Date of visit: 7/31/2019    Referring provider: No ref  provider found    ASSESSMENT     Diagnoses for this encounter:  1  Tremor, essential  primidone (MYSOLINE) 50 mg tablet    primidone (MYSOLINE) 250 mg tablet   2  Dystonic tremor       Impression of this 83 yo gentleman with >35 year hx of tremors, with strong family history of tremor in his sister and father, returns today with some reported improvement of the general non-positional aspect of his hand tremors on primidone 250mg BID  His main issue remains the left hand tremor that is positionally dependent - the part where it is triggered on slight finger flexion  This remainder interferes the most with his ability to hold a cup or to load and shoot his sporting pistol  To review, he had an unknown level of response to propranolol and topiramate is not an option due to sulfa allergy  OT session completed and as minimally beneficial to him  It is certainly now more likely that as one aspect of his tremors improved with the primidone, the dystonic tremor of the hand is more clearly revealed as today   Though he still has no clear abnormal posturing, the suspicions mentioned last time may be more likely  We will proceed as below to titrate to maximum primidone per his preference before considering focal injection of botulinum toxin to the finger flexors of the left hand later  Zonisamide is another potential if the primidone does not fully control the essential tremor part  He denies any side effects thus far  PLAN     · Will keep the current 250mg primidone tablets the same  Will increase the primidone 50mg tablets slowly up to a total of 750mg a day  Add on 50mg tablets as such:   · Week 1:  0 tab in AM / 0 5 tab in PM  · Week 2:  0 5 tab twice a day  · Week 3:  0 5 tab in AM / 1 tab in PM   · Week 4:  1 tab twice a day  · Week 5:  1 tab / 1 5 tab  · Week 6:  1 5 tab twice a day  · Week 7:  1 5 tab / 2 tab  · Week 8:  2 tab twice a day   · Week 9:  2 tab / 2 5 tab  · Week 10:  2 5 tab twice a day  (maximum absolute dose,  375mg BID = 750mg)  · Discussed about potential for botulinum toxin injections focally in the L forearm finger flexors to control what may be dystonic tremor if above does not help  · Regarding Deep Brain Stimulation, he is not a candidate at this time given we have not exhausted medical options and since tremors are mixed type  · The patient has been instructed to call us about any new neurological problems or medication side effects  · Return to Clinic in 4 months    A total of 40 minutes were spent face-to-face with this patient, of which 25% was spent on counseling and coordination of care  We discussed the natural history of the patient's condition, differential diagnosis, level of diagnostic certainty, treatment alternatives and their side effects and possible complications  HISTORY OF PRESENT ILLNESS:     Mr Gemini Colvin is a 82 y o  right handed male who [facility] [address] [place] [address] for transfer of care evaluation of tremors  Last visit 4/25/19  The patient was accompanied today   History was obtained from patient and spouse    Interim History  MRI Brain showed old lacunar stroke in the L basal ganglia  He felt unchanged after weaning down primidone  Instructed to uptitrate gabapentin but he could not tolerate beyond 200mg a day from dizziness and imbalance  Per his preference, gabapentin weaned off and restarted primidone 250mg BID  He has been seeing OT  Laboratory workup was normal except for slightly elevated magnesium  As of 7/8/19 he reported primidone was not working as well as he thought, and requested switch to topiramate  He ultimately opted to discuss this further at current visit, given topiramate has potential for sulfa allergy  He was discharged from OT on 7/24/19 with only a subjective "3-5%" improvement  He now recalls his father and his sister both having tremors as well  He tells me today that when he was off the primidone he felt his tremors worsened; now he is back on primidone  He feels his left hand does not shake when the fingers are flattened, but triggered more when he attempts to curl his fingers around and object to grasp it  He has developed another way of altering his finger position to grasp his bullets that seems to reduce the amplitude of tremor  INITIAL HISTORY  Main bothersome neurological symptoms today are:   1  Bilateral hand tremor  Per Chart review, he has been evaluated by Neurology at [facility] before with Dr Georgiana Bowen and Dr Dawit Horne  He started having tremors in his mid 45s in the R hand  He noticed it while he was trying to write - he would be able to touch his R hand with his L and then the tremors were better  He has difficulty with holding a cup when he  the cup - keeping the 3rd-5th fingers straight avoids triggering tremor  When he attempts to shoot with his handgun, his hands will shake more with the added   He uses two hands to control his mouse on the computer  Working with a screwdriver is very difficult as well as fine motor movements   One Paulina Alatorre) sister with similar type of tremor and taking primidone  He feels his tremors have overall progressed over the years, moreso in the L than the R hand  He was diagnosed as benign essential tremor but no clear benefit on primidone  Pt feels tremors worsen when he holds onto objects  Tremor interferes with his sport hunting  He was attempted on propranolol and there was discussion about focused ultrasound therapy for treatment of medication-refractory essential tremor as patient was not interested in deep brain stimulation at the time  He was not started on topiramate due to his reported history of severe sulfa allergy  He drinks decaffeinated coffee and tea  He does not feel he was on propranolol for very long and he does not know what was the maximum dose or effect he had  He denies injury to the hand or wrists  He had a "couple of head injuries" with a skull fracture from slipping [place] in 2014, another when he sustained a frontal injury from hitting his head on a tailgate of truck       REVIEW OF PAST MEDICAL, SOCIAL AND FAMILY HISTORY:  This is the list of problems as per our Medical Records:    Patient Active Problem List    Diagnosis Date Noted    Asthma 04/25/2019    Hypertension 04/25/2019    Lateral epicondylitis 04/25/2019    Localized primary osteoarthritis of wrist 04/25/2019    Lumbosacral spondylosis without myelopathy 04/25/2019    Primary osteoarthritis of left shoulder 04/25/2019    Carpal tunnel syndrome 10/05/2017    Cubital tunnel syndrome 10/05/2017    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    Neuropathy of both feet 11/20/2015    Benign essential hypertension 06/22/2010    ED (erectile dysfunction) of organic origin 06/22/2010    Lumbar spondylosis with myelopathy 07/28/2008    Spinal stenosis of lumbar region 04/23/2008    Low back pain 04/03/2008     Allergies   Allergen Reactions    Sulfa Antibiotics Other (See Comments) pancreatitis          Outpatient Encounter Medications as of 7/31/2019   Medication Sig Dispense Refill    amLODIPine (NORVASC) 10 mg tablet Take 10 mg by mouth daily       ASPIRIN 81 PO Take 81 mg by mouth daily       carvedilol (COREG) 6 25 mg tablet Take 6 25 mg by mouth 2 (two) times a day with meals       chlorthalidone 25 mg tablet Take 25 mg by mouth daily       Cholecalciferol (VITAMIN D3 PO) Take 4,000 tablets by mouth daily       cloNIDine (CATAPRES) 0 1 mg tablet Take 0 1 mg by mouth once       Coenzyme Q10 (CO Q 10) 10 MG CAPS Take 200 mg by mouth 2 (two) times a day       Flaxseed, Linseed, (FLAXSEED OIL) 1000 MG CAPS Take 1 capsule by mouth daily       FLOVENT DISKUS 250 MCG/BLIST AEPB       furosemide (LASIX) 20 mg tablet Take 20 mg by mouth daily       levothyroxine 75 mcg tablet Take 75 mcg by mouth daily       Multiple Vitamin (MULTI VITAMIN DAILY PO) Take 1 capsule by mouth daily       Multiple Vitamins-Minerals (PRESERVISION AREDS PO) Take 1 tablet by mouth daily       Omega-3 Fatty Acids (FISH OIL PO) Take 5,000 mg by mouth      oxyCODONE (ROXICODONE) 5 mg immediate release tablet       primidone (MYSOLINE) 250 mg tablet Take 1 tablet (250 mg total) by mouth every 12 (twelve) hours 180 tablet 3    Red Yeast Rice 600 MG CAPS Take 600 mg by mouth      Vitamin E 400 units TABS Take 400 Units by mouth daily       [DISCONTINUED] primidone (MYSOLINE) 250 mg tablet Take 1 tablet (250 mg total) by mouth every 12 (twelve) hours 180 tablet 3    gabapentin (NEURONTIN) 100 mg capsule Take 2 capsules (200 mg total) by mouth 3 (three) times a day (Patient not taking: Reported on 7/31/2019) 180 capsule 3    primidone (MYSOLINE) 50 mg tablet Take 2 5 tablets (125 mg total) by mouth 2 (two) times a day taken uptitrated according to instructions, taken in addition to 250mg tablet 450 tablet 3    simvastatin (ZOCOR) 10 mg tablet       [DISCONTINUED] amoxicillin (AMOXIL) 500 mg capsule No facility-administered encounter medications on file as of 7/31/2019  REVIEW OF SYSTEMS:  The patient has entered data on an intake form regarding present illness, past medical and surgical history, medications, allergies, family and social history, and a full review of 14 systems  I have reviewed this form with the patient, and all the relevant information has been included on this note  The full review of systems was negative except as stated in HPI and below  Constitutional: Negative  Negative for appetite change and fever  HENT: Positive for hearing loss  Negative for tinnitus, trouble swallowing and voice change  Eyes: Negative  Negative for photophobia and pain  Respiratory: Negative  Negative for shortness of breath  Cardiovascular: Negative  Negative for palpitations  Gastrointestinal: Negative  Negative for nausea and vomiting  Endocrine: Negative  Negative for cold intolerance and heat intolerance  Genitourinary: Negative  Negative for dysuria, frequency and urgency  Musculoskeletal: Positive for back pain  Negative for myalgias and neck pain  Pain when walking    Skin: Negative  Negative for rash  Neurological: Positive for tremors (went he stopped primadone, they got worse, when taking premidone they are better, but still continues to have tremors  Completed Occupational Therapy )  Negative for dizziness, seizures, syncope, facial asymmetry, speech difficulty, weakness, light-headedness, numbness and headaches  Hematological: Bruises/bleeds easily  Psychiatric/Behavioral: Negative  Negative for confusion, hallucinations and sleep disturbance  FOCUSED PHYSICAL EXAMINATION:     Vital signs:  /76 (BP Location: Left arm, Patient Position: Sitting, Cuff Size: Large)   Pulse 66   Ht 5' 8" (1 727 m)   Wt 91 9 kg (202 lb 9 6 oz)   BMI 30 81 kg/m²      General:  Well-appearing, well nourished, pleasant patient in no acute distress   Mood and Fund of Knowledge are appropriate  Head:  Normocephalic, atraumatic  Oropharynx and conjunctiva are clear  Speech  No hypophonia, no bradylalia  No scanning speech  Language: Comprehension intact  Neck:  Supple, strong 5/5 forward flexion and retroflexion  Extremities: Range of motion is normal       Cognitive and Mental Exam:  The patient is alert, oriented to self, location, date and situation  Memory is normal to provide accurate details of health history    Cranial Nerves:  CN II:  Direct and consensual light reflexes were equally reactive to light symmetrically  No afferent pupillary defect   Visual fields are full to confrontation  CN III / IV / VI: Extraocular movements were full, with normal pursuit and saccades  CN V:   Facial sensation to light touch was intact  CN VII: Face is symmetric with normal strength  CN VIII: Hearing was assessed using the finger rubs and was normal    CN X:   Palate is up going bilaterally and symmetrically  CN XI:  Neck muscles are strong  CN XII: Tongue protrusion is at midline with normal movements  No dysarthria  Motor:    Tremor:  Left hand worse than R  When he clenches the Left hand, the L wrist will start flex-ext tremor  Activating the 3rd, 4th, and 5th digits appears to amplify the tremor and add finger flex-ext to it  This does not occur as strongly by gripping a cup with the thumb and index finger alone  Dyskinesia: none  Myoclonus: none  Chorea: none  Tics: none      [id number]                Time since last dose:   4/25/19 7/31/19   Speech  0   0   Facial Expression  0 0   Postural Tremor (Right) 2  2   Postural Tremor (Left) 3 With wrist flex-ext, flex-ext of the 3rd, 4th fingers  3 able to be triggered   Kinetic Tremor (Right)  2  2   Kinetic Tremor (Left)  3 3   Rest tremor amplitude RUE 0 0   Rest tremor amplitude LUE 0 0   Rest tremor amplitude RLE 0 0   Rest tremor amplitude LLE 0 0   Lip/Jaw Tremor  0 0   Consistency of tremor 0 0   Finger Taps (Right)   0 -   Finger Taps (Left)  0 -   Hand Movement (Right)  0 -   Hand Movement (Left)   0 -   Pronation/Supination (Right)  0 -   Pronation/Supination (Left)   0 -   Toe Tapping (Right) 0 -   Toe Tapping (Left) 0 -   Leg Agility (Right)  0 -   Leg Agility (Left)   0   -   Rigidity - Neck  1   -   Rigidity - Upper Extremity (Right)  1    -   Rigidity - Upper Extremity (Left)   0   -   Rigidity - Lower Extremity (Right)  0 -   Rigidity - Lower Extremity (Left)   0 -   Arising from Chair   0   0    Gait    0   0   Freezing of Gait 0   0   Postural Stability  -   -   Posture 0   0   Global spontaneity of movement 0   0     -------------------------------------------------------------------------------------    Muscle Strength Right Left  Muscle Strength Right Left   Deltoid 5/5 5/5  Hip Adductors 5/5 5/5   Biceps 5/5 5/5  Hip Abductors 5/5 5/5   Triceps 5/5 5/5  Knee Extensors 5/5 5/5   Wrist Extensors 5/5 5/5  Knee Flexors 5/5 5/5   Wrist Flexors 5/5 5/5  Ankle Extensors 5/5 5/5    5/5 5/5  Ankle Flexors 5/5 5/5   Finger Abductors 5/5 5/5       Hip Flexors 5/5 5/5   Hip Extensors 5/5 5/5     Coordination:  Finger-to-nose-finger: normal except with mild intention tremor bilaterally  Gait:  Normal comprehensive gait evaluation, has normal raising, stance, gait, turns        Reflexes:    Right Left   Biceps 1/4 1/4   Brachioradialis 1/4 1/4   Triceps 1/4 1/4   Knee 1/4 1/4   Ankle 1/4 1/4

## 2019-11-18 ENCOUNTER — TELEPHONE (OUTPATIENT)
Dept: NEUROLOGY | Facility: CLINIC | Age: 84
End: 2019-11-18

## 2019-11-18 NOTE — PROGRESS NOTES
DEPARTMENT OF NEUROLOGICAL SCIENCES  [address] [facility]        RETURN PATIENT NOTE    Patient: Amparo Ellis  Medical Record Number: # [id number]  YOB: 1935  Date of visit: 11/20/2019    Referring provider: No ref  provider found    ASSESSMENT     Diagnoses for this encounter:  1  Tremor, essential     2  Dystonic tremor        Impression of this 83 yo gentleman with over 35 year history of tremors as well as a strong family history of tremors, avid sole and outdoorsman, returning for his L>R hand action tremor  His tremor remains complex, with position-dependent features such as amplification on finger flexion, but also a baseline shaking on many different positions  He did not feel additional benefit on higher primidone dose and would like to decrease it for a while  To review, he had an unknown level of response to propranolol and topiramate is not an option due to sulfa allergy  We might revisit the propranolol next  It is likely that as one aspect of his tremors improved with the primidone, the dystonic tremor of the hand is more prominent  Proceed as below  PLAN     · He will go ahead and reduce the primidone as discussed to 500mg (250mg BID) for a week and then call us to report on status  If he does well, and he is satisfied then keep that dose  · Otherwise, we will then do a cross titration to wean down and off primidone while starting the propranolol  Second choice is gabapentin  · Regarding Deep Brain Stimulation, he is not a candidate at this time given we have not exhausted medical options and since tremors are mixed type  · Return to Clinic in 3 months OVL    A total of 40 minutes were spent face-to-face with this patient, of which 25% was spent on counseling and coordination of care      HISTORY OF PRESENT ILLNESS:     Mr La Urena is a 82 y o  right handed male who [facility] to the [facility] [address] [place] [address] transfer of care evaluation of tremors  Last visit 7/31/19  The patient was accompanied today  History was obtained from patient and spouse    Interim History  No interim calls to our office  He had tried the medication up below 750mg as his tremors got worse  He decreased it to 600mg and it was better and he would like to decrease it further to 500mg  He still has most trouble with shaking while gripping objects, L>R but now R is more bothersome since it is overall progressing  He feels that the primidone overall has never helped his tremors, and may have increased it  To review:   Primidone - higher amounts worsen his tremors  Gabapentin - trouble with tolerance beyond 200mg a day   Topiramate - never tried  Propranolol - he had been on it previously to unknown dose and unknown length of time  INITIAL HISTORY  Main bothersome neurological symptoms today are:   1  Bilateral hand tremor  Per Chart review, he has been evaluated by Neurology at [address] [facility] before with Dr Tiff Hardy and Dr Kylee Guy  He started having tremors in his mid 45s in the R hand  He noticed it while he was trying to write - he would be able to touch his R hand with his L and then the tremors were better  He has difficulty with holding a cup when he  the cup - keeping the 3rd-5th fingers straight avoids triggering tremor  When he attempts to shoot with his handgun, his hands will shake more with the added   He uses two hands to control his mouse on the computer  Working with a screwdriver is very difficult as well as fine motor movements  One Cristiana Allan) sister with similar type of tremor and taking primidone  He feels his tremors have overall progressed over the years, moreso in the L than the R hand  He was diagnosed as benign essential tremor but no clear benefit on primidone  Pt feels tremors worsen when he holds onto objects  Tremor interferes with his sport hunting   He was attempted on propranolol and there was discussion about focused ultrasound therapy for treatment of medication-refractory essential tremor as patient was not interested in deep brain stimulation at the time  He was not started on topiramate due to his reported history of severe sulfa allergy  He drinks decaffeinated coffee and tea  He does not feel he was on propranolol for very long and he does not know what was the maximum dose or effect he had  He denies injury to the hand or wrists  He had a "couple of head injuries" with a skull fracture from slipping in his fish pond in 2014, another when he sustained a frontal injury from hitting his head on a tailgate of truck       REVIEW OF PAST MEDICAL, SOCIAL AND FAMILY HISTORY:  This is the list of problems as per our Medical Records:    Patient Active Problem List    Diagnosis Date Noted    Tremor, essential 07/31/2019    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    Asthma 04/25/2019    Hypertension 04/25/2019    Lateral epicondylitis 04/25/2019    Localized primary osteoarthritis of wrist 04/25/2019    Lumbosacral spondylosis without myelopathy 04/25/2019    Primary osteoarthritis of left shoulder 04/25/2019    Carpal tunnel syndrome 10/05/2017    Cubital tunnel syndrome 10/05/2017    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    Neuropathy of both feet 11/20/2015    Benign essential hypertension 06/22/2010    ED (erectile dysfunction) of organic origin 06/22/2010    Lumbar spondylosis with myelopathy 07/28/2008    Spinal stenosis of lumbar region 04/23/2008    Low back pain 04/03/2008     Allergies   Allergen Reactions    Sulfa Antibiotics Other (See Comments)     pancreatitis        Outpatient Encounter Medications as of 11/20/2019   Medication Sig Dispense Refill    amLODIPine (NORVASC) 10 mg tablet Take 10 mg by mouth daily       Ascorbic Acid (VITAMIN C) 1000 MG tablet Take 1,000 mg by mouth daily      ASPIRIN 81 PO Take 81 mg by mouth daily       carvedilol (COREG) 6 25 mg tablet Take 6 25 mg by mouth 2 (two) times a day with meals       chlorthalidone 25 mg tablet Take 25 mg by mouth daily       Cholecalciferol (VITAMIN D3 PO) Take 4,000 tablets by mouth daily       cloNIDine (CATAPRES) 0 1 mg tablet Take 0 1 mg by mouth once       Coenzyme Q10 (CO Q 10) 10 MG CAPS Take 200 mg by mouth 2 (two) times a day       Flaxseed, Linseed, (FLAXSEED OIL) 1000 MG CAPS Take 1 capsule by mouth daily       FLOVENT DISKUS 250 MCG/BLIST AEPB 1 puff a day in the morning      furosemide (LASIX) 20 mg tablet Take 20 mg by mouth daily       levothyroxine 75 mcg tablet Take 75 mcg by mouth daily       Multiple Vitamin (MULTI VITAMIN DAILY PO) Take 1 capsule by mouth daily       Multiple Vitamins-Minerals (PRESERVISION AREDS PO) Take 1 tablet by mouth daily       Omega-3 Fatty Acids (FISH OIL PO) Take 5,000 mg by mouth      oxyCODONE (ROXICODONE) 5 mg immediate release tablet as needed       primidone (MYSOLINE) 250 mg tablet Take 1 tablet (250 mg total) by mouth every 12 (twelve) hours 180 tablet 3    primidone (MYSOLINE) 50 mg tablet Take 2 5 tablets (125 mg total) by mouth 2 (two) times a day taken uptitrated according to instructions, taken in addition to 250mg tablet 450 tablet 3    Red Yeast Rice 600 MG CAPS Take 600 mg by mouth      simvastatin (ZOCOR) 10 mg tablet       Vitamin E 400 units TABS Take 4,000 Units by mouth daily       gabapentin (NEURONTIN) 100 mg capsule Take 2 capsules (200 mg total) by mouth 3 (three) times a day (Patient not taking: Reported on 9/20/2019) 180 capsule 3     No facility-administered encounter medications on file as of 11/20/2019  REVIEW OF SYSTEMS:  The patient has entered data on an intake form regarding present illness, past medical and surgical history, medications, allergies, family and social history, and a full review of 14 systems   I have reviewed this form with the patient, and all the relevant information has been included on this note  The full review of systems was negative except as stated in HPI and below  Constitutional: Negative  Negative for appetite change and fever  HENT: Negative  Negative for hearing loss, tinnitus, trouble swallowing and voice change  Eyes: Negative  Negative for photophobia and pain  Respiratory: Negative  Negative for shortness of breath  Cardiovascular: Negative  Negative for palpitations  Gastrointestinal: Negative  Negative for nausea and vomiting  Endocrine: Negative  Negative for cold intolerance and heat intolerance  Genitourinary: Negative  Negative for dysuria, frequency and urgency  Musculoskeletal: Negative  Negative for myalgias and neck pain  Skin: Negative  Negative for rash  Neurological: Positive for tremors (increasing)  Negative for dizziness, seizures, syncope, facial asymmetry, speech difficulty, weakness, light-headedness, numbness and headaches  Hematological: Negative  Does not bruise/bleed easily  Psychiatric/Behavioral: Negative  Negative for confusion, hallucinations and sleep disturbance  FOCUSED PHYSICAL EXAMINATION:     Vital signs:  /71 (BP Location: Left arm, Patient Position: Sitting, Cuff Size: Standard)   Pulse 60   Ht 5' 8" (1 727 m)   Wt 90 7 kg (200 lb)   BMI 30 41 kg/m²     General:  Well-appearing, well nourished, pleasant patient in no acute distress  Mood and Fund of Knowledge are appropriate  Head:  Normocephalic, atraumatic  Oropharynx and conjunctiva are clear  Speech  No hypophonia, no bradylalia  No scanning speech  Language: Comprehension intact  Neck:  Supple, strong 5/5 forward flexion and retroflexion  Extremities: Range of motion is normal       Cognitive and Mental Exam:  The patient is alert, oriented to self, location, date and situation   Memory is normal to provide accurate details of health history    Cranial Nerves:  CN II:  Direct and consensual light reflexes were equally reactive to light symmetrically  No afferent pupillary defect   Visual fields are full to confrontation  CN III / IV / VI: Extraocular movements were full, with normal pursuit and saccades  CN V:   Facial sensation to light touch was intact  CN VII: Face is symmetric with normal strength  CN VIII: Hearing was not assessed  CN X:   Palate is up going bilaterally and symmetrically  CN XI:  Neck muscles are strong  CN XII: Tongue protrusion is at midline with normal movements  No dysarthria  Motor:    Tremor:  Left hand worse than R  When he clenches the Left hand, the L wrist will start flex-ext tremor  Activating the 3rd, 4th, and 5th digits appears to amplify the tremor and add finger flex-ext to it  This does not occur as strongly by gripping a cup with the thumb and index finger alone  Spiral Drawing: Some lighter pen pressure on the outer loops of the R hand, no clear waveform and no directionality  Loops are close together  On the left hand, he starts looping very wide away from the center and loops overlap, no clear waveform or directionality  +Pen lefts in the upper left hand corner  Handwriting: Illegible x2 signatures, with variable pen pressure on the loops and irregular lettering  No clear wave form  Dot to DOT; smooth without waveform on the R hand, missing target   Wavy worse when approaching target for the L      [id number]                Time since last dose:   4/25/19 7/31/19    Speech  0  0    Facial Expression  0 0    Postural Tremor (Right) 2  2    Postural Tremor (Left) 3 With wrist flex-ext, flex-ext of the 3rd, 4th fingers  3 able to be triggered    Kinetic Tremor (Right)  2  2    Kinetic Tremor (Left)  3 3    Rest tremor amplitude RUE 0 0    Rest tremor amplitude LUE 0 0    Rest tremor amplitude RLE 0 0    Rest tremor amplitude LLE 0 0    Lip/Jaw Tremor  0 0    Consistency of tremor 0 0    Finger Taps (Right)   0 -    Finger Taps (Left)  0 -    Hand Movement (Right)  0 -    Hand Movement (Left)   0 -    Pronation/Supination (Right)  0 -    Pronation/Supination (Left)   0 -    Toe Tapping (Right) 0 -    Toe Tapping (Left) 0 -    Leg Agility (Right)  0 -    Leg Agility (Left)   0  -    Rigidity - Neck  1  -    Rigidity - Upper Extremity (Right)  1   -    Rigidity - Upper Extremity (Left)   0  -    Rigidity - Lower Extremity (Right)  0 -    Rigidity - Lower Extremity (Left)   0 -    Arising from Chair   0  0     Gait   0  0    Freezing of Gait 0  0    Postural Stability  -  -    Posture 0  0    Global spontaneity of movement 0  0      -------------------------------------------------------------------------------------    Muscle Strength Right Left  Muscle Strength Right Left   Deltoid 5/5 5/5  Hip Adductors 5/5 5/5   Biceps 5/5 5/5  Hip Abductors 5/5 5/5   Triceps 5/5 5/5  Knee Extensors 5/5 5/5   Wrist Extensors 5/5 5/5  Knee Flexors 5/5 5/5   Wrist Flexors 5/5 5/5  Ankle Extensors 5/5 5/5    5/5 5/5  Ankle Flexors 5/5 5/5   Finger Abductors 5/5 5/5       Hip Flexors 5/5 5/5   Hip Extensors 5/5 5/5     Coordination:  Finger-to-nose-finger: normal except with mild intention tremor bilaterally  Gait:  Normal comprehensive gait evaluation, has normal raising, stance, gait, turns        Reflexes:    Right Left   Biceps 1/4 1/4   Brachioradialis 1/4 1/4   Triceps 1/4 1/4   Knee 1/4 1/4   Ankle 1/4 1/4

## 2019-11-20 ENCOUNTER — OFFICE VISIT (OUTPATIENT)
Dept: NEUROLOGY | Facility: CLINIC | Age: 84
End: 2019-11-20
Payer: COMMERCIAL

## 2019-11-20 VITALS
WEIGHT: 200 LBS | DIASTOLIC BLOOD PRESSURE: 71 MMHG | SYSTOLIC BLOOD PRESSURE: 156 MMHG | HEART RATE: 60 BPM | BODY MASS INDEX: 30.31 KG/M2 | HEIGHT: 68 IN

## 2019-11-20 DIAGNOSIS — G25.2 DYSTONIC TREMOR: ICD-10-CM

## 2019-11-20 DIAGNOSIS — G25.0 TREMOR, ESSENTIAL: Primary | ICD-10-CM

## 2019-11-20 PROCEDURE — 99215 OFFICE O/P EST HI 40 MIN: CPT | Performed by: PSYCHIATRY & NEUROLOGY

## 2019-11-20 RX ORDER — MULTIVIT WITH MINERALS/LUTEIN
500 TABLET ORAL DAILY
Status: ON HOLD | COMMUNITY
End: 2021-07-17 | Stop reason: CLARIF

## 2019-11-20 NOTE — PATIENT INSTRUCTIONS
· Regarding Deep Brain Stimulation, he is not a candidate at this time given we have not exhausted medical options and since tremors are mixed type  · He will go ahead and reduce the primidone as discussed to 500mg (250mg BID) for a week and then call us to report on status  If he does well, and he is satisfied then keep that dose  · Otherwise, we will then do a cross titration to wean down and off primidone while starting the topiramate  Instructions pending the update  Propranolol is alternative after that     · Return to Clinic in 3 months OVS

## 2019-11-20 NOTE — LETTER
November 20, 2019     Bo Adrian 58    Patient: Akiko De La O   YOB: 1935   Date of Visit: 11/20/2019       Dear Dr Girma Mallory:    Earlier today I saw Mr Princess Braga for follow-up for action hand tremors  Below are my notes for this visit for your records and to keep you updated on his health status  If you have questions, please do not hesitate to call me  I look forward to following your patient along with you  Sincerely,        Oneil Lennon MD        CC: No Recipients  Oneil Lennon MD  11/20/2019  8:57 PM  Sign at close encounter  [address]        RETURN PATIENT NOTE    Patient: Akiko De La O  Medical Record Number: # [id number]  YOB: 1935  Date of visit: 11/20/2019    Referring provider: No ref  provider found    ASSESSMENT     Diagnoses for this encounter:  1  Tremor, essential     2  Dystonic tremor        Impression of this 83 yo gentleman with over 35 year history of tremors as well as a strong family history of tremors, avid sole and outdoorsmita, returning for his L>R hand action tremor  His tremor remains complex, with position-dependent features such as amplification on finger flexion, but also a baseline shaking on many different positions  He did not feel additional benefit on higher primidone dose and would like to decrease it for a while  To review, he had an unknown level of response to propranolol and topiramate is not an option due to sulfa allergy  We might revisit the propranolol next  It is likely that as one aspect of his tremors improved with the primidone, the dystonic tremor of the hand is more prominent  Proceed as below  PLAN     · He will go ahead and reduce the primidone as discussed to 500mg (250mg BID) for a week and then call us to report on status   If he does well, and he is satisfied then keep that dose  · Otherwise, we will then do a cross titration to wean down and off primidone while starting the propranolol  Second choice is gabapentin  · Regarding Deep Brain Stimulation, he is not a candidate at this time given we have not exhausted medical options and since tremors are mixed type  · Return to Clinic in 3 months OVL    A total of 40 minutes were spent face-to-face with this patient, of which 25% was spent on counseling and coordination of care  HISTORY OF PRESENT ILLNESS:     Mr Jeny Dunlap is a 82 y o  right handed male who [facility] [address] [place] [address] for transfer of care evaluation of tremors  Last visit 7/31/19  The patient was accompanied today  History was obtained from patient and spouse    Interim History  No interim calls to our office  He had tried the medication up below 750mg as his tremors got worse  He decreased it to 600mg and it was better and he would like to decrease it further to 500mg  He still has most trouble with shaking while gripping objects, L>R but now R is more bothersome since it is overall progressing  He feels that the primidone overall has never helped his tremors, and may have increased it  To review:   Primidone - higher amounts worsen his tremors  Gabapentin - trouble with tolerance beyond 200mg a day   Topiramate - never tried  Propranolol - he had been on it previously to unknown dose and unknown length of time  INITIAL HISTORY  Main bothersome neurological symptoms today are:   1  Bilateral hand tremor  Per Chart review, he has been evaluated by Neurology at [email or web address] before with Dr Kelsy Juarez and Dr Vi Kerns  He started having tremors in his mid 45s in the R hand  He noticed it while he was trying to write - he would be able to touch his R hand with his L and then the tremors were better  He has difficulty with holding a cup when he  the cup - keeping the 3rd-5th fingers straight avoids triggering tremor  When he attempts to shoot with his handgun, his hands will shake more with the added   He uses two hands to control his mouse on the computer  Working with a screwdriver is very difficult as well as fine motor movements  One Yesica Ballard) sister with similar type of tremor and taking primidone  He feels his tremors have overall progressed over the years, moreso in the L than the R hand  He was diagnosed as benign essential tremor but no clear benefit on primidone  Pt feels tremors worsen when he holds onto objects  Tremor interferes with his sport hunting  He was attempted on propranolol and there was discussion about focused ultrasound therapy for treatment of medication-refractory essential tremor as patient was not interested in deep brain stimulation at the time  He was not started on topiramate due to his reported history of severe sulfa allergy  He drinks decaffeinated coffee and tea  He does not feel he was on propranolol for very long and he does not know what was the maximum dose or effect he had  He denies injury to the hand or wrists  He had a "couple of head injuries" with a skull fracture from slipping in his fish pond in 2014, another when he sustained a frontal injury from hitting his head on a tailgate of truck       REVIEW OF PAST MEDICAL, SOCIAL AND FAMILY HISTORY:  This is the list of problems as per our Medical Records:    Patient Active Problem List    Diagnosis Date Noted    Tremor, essential 07/31/2019    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    Asthma 04/25/2019    Hypertension 04/25/2019    Lateral epicondylitis 04/25/2019    Localized primary osteoarthritis of wrist 04/25/2019    Lumbosacral spondylosis without myelopathy 04/25/2019    Primary osteoarthritis of left shoulder 04/25/2019    Carpal tunnel syndrome 10/05/2017    Cubital tunnel syndrome 10/05/2017    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    Neuropathy of both feet 11/20/2015    Benign essential hypertension 06/22/2010    ED (erectile dysfunction) of organic origin 06/22/2010    Lumbar spondylosis with myelopathy 07/28/2008    Spinal stenosis of lumbar region 04/23/2008    Low back pain 04/03/2008     Allergies   Allergen Reactions    Sulfa Antibiotics Other (See Comments)     pancreatitis        Outpatient Encounter Medications as of 11/20/2019   Medication Sig Dispense Refill    amLODIPine (NORVASC) 10 mg tablet Take 10 mg by mouth daily       Ascorbic Acid (VITAMIN C) 1000 MG tablet Take 1,000 mg by mouth daily      ASPIRIN 81 PO Take 81 mg by mouth daily       carvedilol (COREG) 6 25 mg tablet Take 6 25 mg by mouth 2 (two) times a day with meals       chlorthalidone 25 mg tablet Take 25 mg by mouth daily       Cholecalciferol (VITAMIN D3 PO) Take 4,000 tablets by mouth daily       cloNIDine (CATAPRES) 0 1 mg tablet Take 0 1 mg by mouth once       Coenzyme Q10 (CO Q 10) 10 MG CAPS Take 200 mg by mouth 2 (two) times a day       Flaxseed, Linseed, (FLAXSEED OIL) 1000 MG CAPS Take 1 capsule by mouth daily       FLOVENT DISKUS 250 MCG/BLIST AEPB 1 puff a day in the morning      furosemide (LASIX) 20 mg tablet Take 20 mg by mouth daily       levothyroxine 75 mcg tablet Take 75 mcg by mouth daily       Multiple Vitamin (MULTI VITAMIN DAILY PO) Take 1 capsule by mouth daily       Multiple Vitamins-Minerals (PRESERVISION AREDS PO) Take 1 tablet by mouth daily       Omega-3 Fatty Acids (FISH OIL PO) Take 5,000 mg by mouth      oxyCODONE (ROXICODONE) 5 mg immediate release tablet as needed       primidone (MYSOLINE) 250 mg tablet Take 1 tablet (250 mg total) by mouth every 12 (twelve) hours 180 tablet 3    primidone (MYSOLINE) 50 mg tablet Take 2 5 tablets (125 mg total) by mouth 2 (two) times a day taken uptitrated according to instructions, taken in addition to 250mg tablet 450 tablet 3    Red Yeast Rice 600 MG CAPS Take 600 mg by mouth      simvastatin (ZOCOR) 10 mg tablet  Vitamin E 400 units TABS Take 4,000 Units by mouth daily       gabapentin (NEURONTIN) 100 mg capsule Take 2 capsules (200 mg total) by mouth 3 (three) times a day (Patient not taking: Reported on 9/20/2019) 180 capsule 3     No facility-administered encounter medications on file as of 11/20/2019  REVIEW OF SYSTEMS:  The patient has entered data on an intake form regarding present illness, past medical and surgical history, medications, allergies, family and social history, and a full review of 14 systems  I have reviewed this form with the patient, and all the relevant information has been included on this note  The full review of systems was negative except as stated in HPI and below  Constitutional: Negative  Negative for appetite change and fever  HENT: Negative  Negative for hearing loss, tinnitus, trouble swallowing and voice change  Eyes: Negative  Negative for photophobia and pain  Respiratory: Negative  Negative for shortness of breath  Cardiovascular: Negative  Negative for palpitations  Gastrointestinal: Negative  Negative for nausea and vomiting  Endocrine: Negative  Negative for cold intolerance and heat intolerance  Genitourinary: Negative  Negative for dysuria, frequency and urgency  Musculoskeletal: Negative  Negative for myalgias and neck pain  Skin: Negative  Negative for rash  Neurological: Positive for tremors (increasing)  Negative for dizziness, seizures, syncope, facial asymmetry, speech difficulty, weakness, light-headedness, numbness and headaches  Hematological: Negative  Does not bruise/bleed easily  Psychiatric/Behavioral: Negative  Negative for confusion, hallucinations and sleep disturbance      FOCUSED PHYSICAL EXAMINATION:     Vital signs:  /71 (BP Location: Left arm, Patient Position: Sitting, Cuff Size: Standard)   Pulse 60   Ht 5' 8" (1 727 m)   Wt 90 7 kg (200 lb)   BMI 30 41 kg/m²      General:  Well-appearing, well nourished, pleasant patient in no acute distress  Mood and Fund of Knowledge are appropriate  Head:  Normocephalic, atraumatic  Oropharynx and conjunctiva are clear  Speech  No hypophonia, no bradylalia  No scanning speech  Language: Comprehension intact  Neck:  Supple, strong 5/5 forward flexion and retroflexion  Extremities: Range of motion is normal       Cognitive and Mental Exam:  The patient is alert, oriented to self, location, date and situation  Memory is normal to provide accurate details of health history    Cranial Nerves:  CN II:  Direct and consensual light reflexes were equally reactive to light symmetrically  No afferent pupillary defect   Visual fields are full to confrontation  CN III / IV / VI: Extraocular movements were full, with normal pursuit and saccades  CN V:   Facial sensation to light touch was intact  CN VII: Face is symmetric with normal strength  CN VIII: Hearing was not assessed  CN X:   Palate is up going bilaterally and symmetrically  CN XI:  Neck muscles are strong  CN XII: Tongue protrusion is at midline with normal movements  No dysarthria  Motor:    Tremor:  Left hand worse than R  When he clenches the Left hand, the L wrist will start flex-ext tremor  Activating the 3rd, 4th, and 5th digits appears to amplify the tremor and add finger flex-ext to it  This does not occur as strongly by gripping a cup with the thumb and index finger alone  Spiral Drawing: Some lighter pen pressure on the outer loops of the R hand, no clear waveform and no directionality  Loops are close together  On the left hand, he starts looping very wide away from the center and loops overlap, no clear waveform or directionality  +Pen lefts in the upper left hand corner  Handwriting: Illegible x2 signatures, with variable pen pressure on the loops and irregular lettering  No clear wave form  Dot to DOT; smooth without waveform on the R hand, missing target   Wavy worse when approaching target for the L      UPDRSIII                Time since last dose:   4/25/19 7/31/19    Speech  0  0    Facial Expression  0 0    Postural Tremor (Right) 2  2    Postural Tremor (Left) 3 With wrist flex-ext, flex-ext of the 3rd, 4th fingers  3 able to be triggered    Kinetic Tremor (Right)  2  2    Kinetic Tremor (Left)  3 3    Rest tremor amplitude RUE 0 0    Rest tremor amplitude LUE 0 0    Rest tremor amplitude RLE 0 0    Rest tremor amplitude LLE 0 0    Lip/Jaw Tremor  0 0    Consistency of tremor 0 0    Finger Taps (Right)   0 -    Finger Taps (Left)  0 -    Hand Movement (Right)  0 -    Hand Movement (Left)   0 -    Pronation/Supination (Right)  0 -    Pronation/Supination (Left)   0 -    Toe Tapping (Right) 0 -    Toe Tapping (Left) 0 -    Leg Agility (Right)  0 -    Leg Agility (Left)   0  -    Rigidity - Neck  1  -    Rigidity - Upper Extremity (Right)  1   -    Rigidity - Upper Extremity (Left)   0  -    Rigidity - Lower Extremity (Right)  0 -    Rigidity - Lower Extremity (Left)   0 -    Arising from Chair   0  0     Gait   0  0    Freezing of Gait 0  0    Postural Stability  -  -    Posture 0  0    Global spontaneity of movement 0  0      -------------------------------------------------------------------------------------    Muscle Strength Right Left  Muscle Strength Right Left   Deltoid 5/5 5/5  Hip Adductors 5/5 5/5   Biceps 5/5 5/5  Hip Abductors 5/5 5/5   Triceps 5/5 5/5  Knee Extensors 5/5 5/5   Wrist Extensors 5/5 5/5  Knee Flexors 5/5 5/5   Wrist Flexors 5/5 5/5  Ankle Extensors 5/5 5/5    5/5 5/5  Ankle Flexors 5/5 5/5   Finger Abductors 5/5 5/5       Hip Flexors 5/5 5/5   Hip Extensors 5/5 5/5     Coordination:  Finger-to-nose-finger: normal except with mild intention tremor bilaterally  Gait:  Normal comprehensive gait evaluation, has normal raising, stance, gait, turns        Reflexes:    Right Left   Biceps 1/4 1/4   Brachioradialis 1/4 1/4   Triceps 1/4 1/4   Knee 1/4 1/4   Ankle 1/4 1/4

## 2019-11-20 NOTE — PROGRESS NOTES
Patient ID: Alida Roth is a 82 y o  male  Assessment/Plan:    No problem-specific Assessment & Plan notes found for this encounter  {Assess/PlanSmartLinks:38249}       Subjective:    HPI    {[facility] HPI texts:99909}    {Common ambulatory SmartLinks:39996}         Objective: There were no vitals taken for this visit  Physical Exam    Neurological Exam      ROS:    Review of Systems   Constitutional: Negative  Negative for appetite change and fever  HENT: Negative  Negative for hearing loss, tinnitus, trouble swallowing and voice change  Eyes: Negative  Negative for photophobia and pain  Respiratory: Negative  Negative for shortness of breath  Cardiovascular: Negative  Negative for palpitations  Gastrointestinal: Negative  Negative for nausea and vomiting  Endocrine: Negative  Negative for cold intolerance and heat intolerance  Genitourinary: Negative  Negative for dysuria, frequency and urgency  Musculoskeletal: Negative  Negative for myalgias and neck pain  Skin: Negative  Negative for rash  Neurological: Positive for tremors (increasing)  Negative for dizziness, seizures, syncope, facial asymmetry, speech difficulty, weakness, light-headedness, numbness and headaches  Hematological: Negative  Does not bruise/bleed easily  Psychiatric/Behavioral: Negative  Negative for confusion, hallucinations and sleep disturbance

## 2020-02-12 ENCOUNTER — TELEPHONE (OUTPATIENT)
Dept: NEUROLOGY | Facility: CLINIC | Age: 85
End: 2020-02-12

## 2020-02-12 NOTE — TELEPHONE ENCOUNTER
Lm for  Marty Bulmaro to offer [id number] 2pm Dr Sid Barron patient has appointment [phone] if he would like to be seen sooner, held time

## 2020-02-20 ENCOUNTER — TELEPHONE (OUTPATIENT)
Dept: NEUROLOGY | Facility: CLINIC | Age: 85
End: 2020-02-20

## 2020-02-25 NOTE — PROGRESS NOTES
DEPARTMENT OF NEUROLOGICAL SCIENCES  [address] [facility]        RETURN PATIENT NOTE    Patient: Rebecca Roth  Medical Record Number: # [id number]  YOB: 1935  Date of visit: 2/26/2020    Referring provider: No ref  provider found    ASSESSMENT     Diagnoses for this encounter:  1  Dystonic tremor     2  Acquired torsion dystonia        Impression of this 81 yo gentleman with a 35+ yr personal history of tremor, positive family hx of tremors, following up for bilateral L>R action hand tremor  This is both postural and kinetic and mainly manifests on attempting to adopt a shooting position and worsens with finger pressure  He primarily has finger and wrist flexion that is prominently seen, but unchanged from before  He has more success with a bow than his pistol-shooting  On examination he does have hyperextension of the L 3rd-5th fingers, the same fingers most in action and there does appear to be a null point of sorts when asked to hyperpronate the wrist, and worsens on supination at least on the L hand  Overall, given his lack of response to the current medications for essential tremor (ET) and the suspicion of dystonic tremor, we discussed about attempting botulinum toxin injections and he was amenable  PLAN     · Given abnormal posturing of fingers and utter lack of effect from primidone, we will seek approval for botulinum toxin first on the L hand, which is nondominant and more severe  95060 + [id number] on the finger flexors, wrist flexor  · Regarding the primidone, we left him the option of keeping the dose or tapering it down and off  He was amenable to the latter, and will proceed as such:   · Every three days reduce total daily dose by 50mg until off  And he will contact us in case he does worse  · Return to Clinic in 3 months OVS or sooner for botulinum toxin procedure       A total of 25 minutes were spent face-to-face with this patient, of which 35% was spent on counseling and coordination of care  HISTORY OF PRESENT ILLNESS:     Mr Magen Smith is a 82 y o  right handed male who [facility] [address] [place] [address] for tremors  Last visit 11/20/19  The patient was accompanied today  History was obtained from patient and spouse    Interim History  No interim calls to our office  He has good days and bad days, but overall feels unchanged  Tremors remain in both hands, especially his left, and entirely only while handling objects or picking up to shoot  He does better with a bow, having taken to using a monopod to steady his aim with a point of contact on the ground  He thinks the tremors were worse on 700mg primidone a day  He has gradually stepped down the primidone back down to 250mg BID  Tremors cease upon termination of action  He denies anything else of note in the interim  To review:   Primidone - higher amounts worsen his tremors, no tangible benefit at any level  Gabapentin - trouble with tolerance beyond 200mg a day   Topiramate - never tried due to sulfa allergy  Propranolol - he had been on it previously to unknown dose and unknown length of time  INITIAL HISTORY  Main bothersome neurological symptoms today are:   1  Bilateral hand tremor  Per Chart review, he has been evaluated by Neurology at [facility] before with Dr Dion Avelar and Dr Ryan Lenz  He started having tremors in his mid 45s in the R hand  He noticed it while he was trying to write - he would be able to touch his R hand with his L and then the tremors were better  He has difficulty with holding a cup when he  the cup - keeping the 3rd-5th fingers straight avoids triggering tremor  When he attempts to shoot with his handgun, his hands will shake more with the added   He uses two hands to control his mouse on the computer  Working with a screwdriver is very difficult as well as fine motor movements   One Ronaldo Castleman) sister with similar type of tremor and taking primidone  He feels his tremors have overall progressed over the years, moreso in the L than the R hand  He was diagnosed as benign essential tremor but no clear benefit on primidone  Pt feels tremors worsen when he holds onto objects  Tremor interferes with his sport hunting  He was attempted on propranolol and there was discussion about focused ultrasound therapy for treatment of medication-refractory essential tremor as patient was not interested in deep brain stimulation at the time  He was not started on topiramate due to his reported history of severe sulfa allergy  He drinks decaffeinated coffee and tea  He does not feel he was on propranolol for very long and he does not know what was the maximum dose or effect he had  He denies injury to the hand or wrists  He had a "couple of head injuries" with a skull fracture from slipping in his fish pond in 2014, another when he sustained a frontal injury from hitting his head on a tailgate of truck       REVIEW OF PAST MEDICAL, SOCIAL AND FAMILY HISTORY:  This is the list of problems as per our Medical Records:    Patient Active Problem List    Diagnosis Date Noted    Acquired torsion dystonia 02/27/2020    Tremor, essential 07/31/2019    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    Asthma 04/25/2019    Hypertension 04/25/2019    Lateral epicondylitis 04/25/2019    Localized primary osteoarthritis of wrist 04/25/2019    Lumbosacral spondylosis without myelopathy 04/25/2019    Primary osteoarthritis of left shoulder 04/25/2019    Carpal tunnel syndrome 10/05/2017    Cubital tunnel syndrome 10/05/2017    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    Neuropathy of both feet 11/20/2015    Benign essential hypertension 06/22/2010    ED (erectile dysfunction) of organic origin 06/22/2010    Lumbar spondylosis with myelopathy 07/28/2008    Spinal stenosis of lumbar region 04/23/2008    Low back pain 04/03/2008     Allergies Allergen Reactions    Sulfa Antibiotics Other (See Comments)     pancreatitis    Other reaction(s):  Other (Please comment)  Pancreas destroy itself      Outpatient Encounter Medications as of 2/26/2020   Medication Sig Dispense Refill    albuterol (PROVENTIL HFA,VENTOLIN HFA) 90 mcg/act inhaler       amLODIPine (NORVASC) 10 mg tablet Take 10 mg by mouth daily       amoxicillin (AMOXIL) 500 mg capsule       Ascorbic Acid (VITAMIN C) 1000 MG tablet Take 1,000 mg by mouth daily      ASPIRIN 81 PO Take 81 mg by mouth daily       carvedilol (COREG) 6 25 mg tablet Take 6 25 mg by mouth 2 (two) times a day with meals       chlorthalidone 25 mg tablet Take 25 mg by mouth daily       Cholecalciferol (VITAMIN D3 PO) Take 4,000 tablets by mouth daily       cloNIDine (CATAPRES) 0 1 mg tablet Take 0 1 mg by mouth once       Coenzyme Q10 (CO Q 10) 10 MG CAPS Take 200 mg by mouth 2 (two) times a day       Flaxseed, Linseed, (FLAXSEED OIL) 1000 MG CAPS Take 1 capsule by mouth daily       FLOVENT DISKUS 250 MCG/BLIST AEPB 1 puff a day in the morning      furosemide (LASIX) 20 mg tablet Take 20 mg by mouth daily       levothyroxine 75 mcg tablet Take 75 mcg by mouth daily       Multiple Vitamin (MULTI VITAMIN DAILY PO) Take 1 capsule by mouth daily       Multiple Vitamins-Minerals (PRESERVISION AREDS PO) Take 1 tablet by mouth daily       Omega-3 Fatty Acids (FISH OIL PO) Take 5,000 mg by mouth      oxyCODONE (ROXICODONE) 5 mg immediate release tablet as needed       primidone (MYSOLINE) 250 mg tablet Take 1 tablet (250 mg total) by mouth every 12 (twelve) hours 180 tablet 3    primidone (MYSOLINE) 50 mg tablet Take 2 5 tablets (125 mg total) by mouth 2 (two) times a day taken uptitrated according to instructions, taken in addition to 250mg tablet 450 tablet 3    Red Yeast Rice 600 MG CAPS Take 600 mg by mouth      simvastatin (ZOCOR) 10 mg tablet       Vitamin E 400 units TABS Take 4,000 Units by mouth daily       gabapentin (NEURONTIN) 100 mg capsule Take 2 capsules (200 mg total) by mouth 3 (three) times a day (Patient not taking: Reported on 2/26/2020) 180 capsule 3     No facility-administered encounter medications on file as of 2/26/2020  REVIEW OF SYSTEMS:  The patient has entered data on an intake form regarding present illness, past medical and surgical history, medications, allergies, family and social history, and a full review of 14 systems  I have reviewed this form with the patient, and all the relevant information has been included on this note  The full review of systems was negative except as stated in HPI and below  Constitutional: Negative  Negative for appetite change and fever  HENT: Negative  Negative for hearing loss, tinnitus, trouble swallowing and voice change  Eyes: Negative for photophobia and pain  CATARACT IN RIGHT EYE   Respiratory: Negative  Negative for shortness of breath  Cardiovascular: Negative  Negative for palpitations  Gastrointestinal: Negative  Negative for nausea and vomiting  Endocrine: Negative  Negative for cold intolerance and heat intolerance  Genitourinary: Negative  Negative for dysuria, frequency and urgency  Musculoskeletal: Negative  Negative for myalgias and neck pain  Skin: Negative  Negative for rash  Neurological: Positive for tremors (ARE ABOUT THE SAME)  Negative for dizziness, seizures, syncope, facial asymmetry, speech difficulty, weakness, light-headedness, numbness and headaches  Hematological: Negative  Does not bruise/bleed easily  Psychiatric/Behavioral: Negative  Negative for confusion, hallucinations and sleep disturbance       FOCUSED PHYSICAL EXAMINATION:     Vital signs:  /64 (BP Location: Right arm, Patient Position: Sitting, Cuff Size: Large)   Pulse 68   Ht 5' 8" (1 727 m)   Wt 91 8 kg (202 lb 6 4 oz)   BMI 30 77 kg/m²     General:  Well-appearing, well nourished, pleasant patient in no acute distress  Mood appropriate  Head:  Normocephalic, atraumatic  Oropharynx and conjunctiva are clear  Speech  No hypophonia, no bradylalia  No scanning speech  Language: Comprehension intact  Neck:  Supple, strong 5/5 forward flexion and retroflexion  Extremities: Range of motion is normal       Cognitive and Mental Exam:  The patient is alert, oriented to self, location, date and situation  Cranial Nerves:  CN II:  Direct and consensual light reflexes were equally reactive to light symmetrically  No afferent pupillary defect   Visual fields are full to confrontation  CN III / IV / VI: Extraocular movements were full, with normal pursuit and saccades  CN V:   Facial sensation to light touch was intact  CN VII: Face is symmetric with normal strength  CN VIII: Hearing was not assessed  CN X:   Palate is up going bilaterally and symmetrically  CN XI:  Neck muscles are strong  CN XII: Tongue protrusion is at midline with normal movements  No dysarthria  Motor:    Tremor:  Left hand worse than R  Clenching the L hand fingers worsens finger flex-ext and better on hyperpronation of wrist, worst on supination  Activating the 3rd, 4th, and 5th digits appears to amplify the tremor and add finger flex-ext to it  This does not occur as strongly by gripping a cup with the thumb and index finger alone       [id number]                Time since last dose:   4/25/19 7/31/19 2/26/20   Speech  0  0  0   Facial Expression  0 0 0   Postural Tremor (Right) 2  2 2   Postural Tremor (Left) 3 With wrist flex-ext, flex-ext of the 3rd, 4th fingers  3 able to be triggered 3 affected fingers as before   Kinetic Tremor (Right)  2  2 2   Kinetic Tremor (Left)  3 3 3   Rest tremor amplitude RUE 0 0 0 zero rest tremor   Rest tremor amplitude LUE 0 0 0   Rest tremor amplitude RLE 0 0 0   Rest tremor amplitude LLE 0 0 0   Lip/Jaw Tremor  0 0 0   Consistency of tremor 0 0 0   Finger Taps (Right)   0 - - Finger Taps (Left)  0 - -   Hand Movement (Right)  0 - -   Hand Movement (Left)   0 - -   Pronation/Supination (Right)  0 - -   Pronation/Supination (Left)   0 - -   Toe Tapping (Right) 0 - -   Toe Tapping (Left) 0 - -   Leg Agility (Right)  0 - -   Leg Agility (Left)   0  - -   Rigidity - Neck  1  - -   Rigidity - Upper Extremity (Right)  1   - -   Rigidity - Upper Extremity (Left)   0  - -   Rigidity - Lower Extremity (Right)  0 - -   Rigidity - Lower Extremity (Left)   0 - -   Arising from Chair   0  0 0    Gait   0  0 0   Freezing of Gait 0  0 0   Postural Stability  -  - 0   Posture 0  0 0   Global spontaneity of movement 0  0 0     -------------------------------------------------------------------------------------    Muscle Strength Right Left  Muscle Strength Right Left   Deltoid 5/5 5/5  Hip Adductors 5/5 5/5   Biceps 5/5 5/5  Hip Abductors 5/5 5/5   Triceps 5/5 5/5  Knee Extensors 5/5 5/5   Wrist Extensors 5/5 5/5  Knee Flexors 5/5 5/5   Wrist Flexors 5/5 5/5  Ankle Extensors 5/5 5/5    5/5 5/5  Ankle Flexors 5/5 5/5   Finger Abductors 5/5 5/5       Hip Flexors 5/5 5/5   Hip Extensors 5/5 5/5     Coordination:  Finger-to-nose-finger: normal except for mild bilateral intention tremor  Gait:  Normal gait, stance and turns        Reflexes:    Right Left   Biceps 1/4 1/4   Brachioradialis 1/4 1/4   Triceps 1/4 1/4   Knee 1/4 1/4   Ankle 1/4 1/4

## 2020-02-26 ENCOUNTER — OFFICE VISIT (OUTPATIENT)
Dept: NEUROLOGY | Facility: CLINIC | Age: 85
End: 2020-02-26
Payer: COMMERCIAL

## 2020-02-26 VITALS
HEART RATE: 68 BPM | HEIGHT: 68 IN | BODY MASS INDEX: 30.68 KG/M2 | DIASTOLIC BLOOD PRESSURE: 64 MMHG | WEIGHT: 202.4 LBS | SYSTOLIC BLOOD PRESSURE: 132 MMHG

## 2020-02-26 DIAGNOSIS — G25.2 DYSTONIC TREMOR: Primary | ICD-10-CM

## 2020-02-26 DIAGNOSIS — G24.8 ACQUIRED TORSION DYSTONIA: ICD-10-CM

## 2020-02-26 PROCEDURE — 99214 OFFICE O/P EST MOD 30 MIN: CPT | Performed by: PSYCHIATRY & NEUROLOGY

## 2020-02-26 RX ORDER — ALBUTEROL SULFATE 90 UG/1
2 AEROSOL, METERED RESPIRATORY (INHALATION) EVERY 4 HOURS PRN
COMMUNITY
Start: 2019-12-16

## 2020-02-26 RX ORDER — AMOXICILLIN 500 MG/1
CAPSULE ORAL
Status: ON HOLD | COMMUNITY
Start: 2020-02-21 | End: 2021-07-17 | Stop reason: CLARIF

## 2020-02-26 NOTE — Clinical Note
Cyndi Loera, we'd like to start approval process for botox inj to the left upper extremity, 100 units to start for [id number] [facility] + [phone] please   Thanks

## 2020-02-26 NOTE — PATIENT INSTRUCTIONS
· Given abnormal posturing of fingers, we will seek approval for botulinum toxin first on the L hand, which is nondominant and more severe  55248 + 45055 on the finger flexors, wrist flexor  · Regarding the primidone, we left him the option of keeping the dose or tapering it down and off  He was amenable to the latter, and will proceed as such:   · Every three days reduce total daily dose by 50mg until off  And he will contact us in case he does worse  · Return to Clinic in 3 months OVS or sooner for botulinum toxin procedure

## 2020-02-26 NOTE — LETTER
February 27, 2020     Harvey GreerDO Bess Phoenix [id number]    Patient: Perla Peralta   YOB: 1935   Date of Visit: 2/26/2020       Dear Dr Sarah Sy:    Earlier yesterday I saw Mr  Clay Chow for follow-up of his action hand tremors  Below are my notes for this visit for your records and to keep you updated on his health status  If you have questions, please do not hesitate to call me  I look forward to following your patient along with you  Sincerely,        Moo Frank MD        CC: No Recipients  Moo Frank MD  2/27/2020  3:48 AM  Sign at close encounter  [address] and [facility] PATIENT NOTE    Patient: Perla Peralta  Medical Record Number: # [id number]  YOB: 1935  Date of visit: 2/26/2020    Referring provider: No ref  provider found    ASSESSMENT     Diagnoses for this encounter:  1  Dystonic tremor     2  Acquired torsion dystonia        Impression of this 83 yo gentleman with a 35+ yr personal history of tremor, positive family hx of tremors, following up for bilateral L>R action hand tremor  This is both postural and kinetic and mainly manifests on attempting to adopt a shooting position and worsens with finger pressure  He primarily has finger and wrist flexion that is prominently seen, but unchanged from before  He has more success with a bow than his pistol-shooting  On examination he does have hyperextension of the L 3rd-5th fingers, the same fingers most in action and there does appear to be a null point of sorts when asked to hyperpronate the wrist, and worsens on supination at least on the L hand  Overall, given his lack of response to the current medications for essential tremor (ET) and the suspicion of dystonic tremor, we discussed about attempting botulinum toxin injections and he was amenable       PLAN     · Given abnormal posturing of fingers and utter lack of effect from primidone, we will seek approval for botulinum toxin first on the L hand, which is nondominant and more severe  09031 + [id number] on the finger flexors, wrist flexor  · Regarding the primidone, we left him the option of keeping the dose or tapering it down and off  He was amenable to the latter, and will proceed as such:   · Every three days reduce total daily dose by 50mg until off  And he will contact us in case he does worse  · Return to Clinic in 3 months OVS or sooner for botulinum toxin procedure  A total of 25 minutes were spent face-to-face with this patient, of which 35% was spent on counseling and coordination of care  HISTORY OF PRESENT ILLNESS:     Mr Haim Olmstead is a 82 y o  right handed male who [facility] [address] [place] [address] for tremors  Last visit 11/20/19  The patient was accompanied today  History was obtained from patient and spouse    Interim History  No interim calls to our office  He has good days and bad days, but overall feels unchanged  Tremors remain in both hands, especially his left, and entirely only while handling objects or picking up to shoot  He does better with a bow, having taken to using a monopod to steady his aim with a point of contact on the ground  He thinks the tremors were worse on 700mg primidone a day  He has gradually stepped down the primidone back down to 250mg BID  Tremors cease upon termination of action  He denies anything else of note in the interim  To review:   Primidone - higher amounts worsen his tremors, no tangible benefit at any level  Gabapentin - trouble with tolerance beyond 200mg a day   Topiramate - never tried due to sulfa allergy  Propranolol - he had been on it previously to unknown dose and unknown length of time  INITIAL HISTORY  Main bothersome neurological symptoms today are:   1  Bilateral hand tremor         Per Chart review, he has been evaluated by Neurology at [facility] before with Dr Tiff Hardy and Dr Kylee Guy  He started having tremors in his mid 45s in the R hand  He noticed it while he was trying to write - he would be able to touch his R hand with his L and then the tremors were better  He has difficulty with holding a cup when he  the cup - keeping the 3rd-5th fingers straight avoids triggering tremor  When he attempts to shoot with his handgun, his hands will shake more with the added   He uses two hands to control his mouse on the computer  Working with a screwdriver is very difficult as well as fine motor movements  One Cristiana Lemus) sister with similar type of tremor and taking primidone  He feels his tremors have overall progressed over the years, moreso in the L than the R hand  He was diagnosed as benign essential tremor but no clear benefit on primidone  Pt feels tremors worsen when he holds onto objects  Tremor interferes with his sport hunting  He was attempted on propranolol and there was discussion about focused ultrasound therapy for treatment of medication-refractory essential tremor as patient was not interested in deep brain stimulation at the time  He was not started on topiramate due to his reported history of severe sulfa allergy  He drinks decaffeinated coffee and tea  He does not feel he was on propranolol for very long and he does not know what was the maximum dose or effect he had  He denies injury to the hand or wrists  He had a "couple of head injuries" with a skull fracture from slipping in his fish pond in 2014, another when he sustained a frontal injury from hitting his head on a tailgate of truck       REVIEW OF PAST MEDICAL, SOCIAL AND FAMILY HISTORY:  This is the list of problems as per our Medical Records:    Patient Active Problem List    Diagnosis Date Noted    Acquired torsion dystonia 02/27/2020    Tremor, essential 07/31/2019    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    Asthma 04/25/2019    Hypertension 04/25/2019    Lateral epicondylitis 04/25/2019    Localized primary osteoarthritis of wrist 04/25/2019    Lumbosacral spondylosis without myelopathy 04/25/2019    Primary osteoarthritis of left shoulder 04/25/2019    Carpal tunnel syndrome 10/05/2017    Cubital tunnel syndrome 10/05/2017    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    Neuropathy of both feet 11/20/2015    Benign essential hypertension 06/22/2010    ED (erectile dysfunction) of organic origin 06/22/2010    Lumbar spondylosis with myelopathy 07/28/2008    Spinal stenosis of lumbar region 04/23/2008    Low back pain 04/03/2008     Allergies   Allergen Reactions    Sulfa Antibiotics Other (See Comments)     pancreatitis    Other reaction(s):  Other (Please comment)  Pancreas destroy itself      Outpatient Encounter Medications as of 2/26/2020   Medication Sig Dispense Refill    albuterol (PROVENTIL HFA,VENTOLIN HFA) 90 mcg/act inhaler       amLODIPine (NORVASC) 10 mg tablet Take 10 mg by mouth daily       amoxicillin (AMOXIL) 500 mg capsule       Ascorbic Acid (VITAMIN C) 1000 MG tablet Take 1,000 mg by mouth daily      ASPIRIN 81 PO Take 81 mg by mouth daily       carvedilol (COREG) 6 25 mg tablet Take 6 25 mg by mouth 2 (two) times a day with meals       chlorthalidone 25 mg tablet Take 25 mg by mouth daily       Cholecalciferol (VITAMIN D3 PO) Take 4,000 tablets by mouth daily       cloNIDine (CATAPRES) 0 1 mg tablet Take 0 1 mg by mouth once       Coenzyme Q10 (CO Q 10) 10 MG CAPS Take 200 mg by mouth 2 (two) times a day       Flaxseed, Linseed, (FLAXSEED OIL) 1000 MG CAPS Take 1 capsule by mouth daily       FLOVENT DISKUS 250 MCG/BLIST AEPB 1 puff a day in the morning      furosemide (LASIX) 20 mg tablet Take 20 mg by mouth daily       levothyroxine 75 mcg tablet Take 75 mcg by mouth daily       Multiple Vitamin (MULTI VITAMIN DAILY PO) Take 1 capsule by mouth daily       Multiple Vitamins-Minerals (PRESERVISION AREDS PO) Take 1 tablet by mouth daily       Omega-3 Fatty Acids (FISH OIL PO) Take 5,000 mg by mouth      oxyCODONE (ROXICODONE) 5 mg immediate release tablet as needed       primidone (MYSOLINE) 250 mg tablet Take 1 tablet (250 mg total) by mouth every 12 (twelve) hours 180 tablet 3    primidone (MYSOLINE) 50 mg tablet Take 2 5 tablets (125 mg total) by mouth 2 (two) times a day taken uptitrated according to instructions, taken in addition to 250mg tablet 450 tablet 3    Red Yeast Rice 600 MG CAPS Take 600 mg by mouth      simvastatin (ZOCOR) 10 mg tablet       Vitamin E 400 units TABS Take 4,000 Units by mouth daily       gabapentin (NEURONTIN) 100 mg capsule Take 2 capsules (200 mg total) by mouth 3 (three) times a day (Patient not taking: Reported on 2/26/2020) 180 capsule 3     No facility-administered encounter medications on file as of 2/26/2020  REVIEW OF SYSTEMS:  The patient has entered data on an intake form regarding present illness, past medical and surgical history, medications, allergies, family and social history, and a full review of 14 systems  I have reviewed this form with the patient, and all the relevant information has been included on this note  The full review of systems was negative except as stated in HPI and below  Constitutional: Negative  Negative for appetite change and fever  HENT: Negative  Negative for hearing loss, tinnitus, trouble swallowing and voice change  Eyes: Negative for photophobia and pain  CATARACT IN RIGHT EYE   Respiratory: Negative  Negative for shortness of breath  Cardiovascular: Negative  Negative for palpitations  Gastrointestinal: Negative  Negative for nausea and vomiting  Endocrine: Negative  Negative for cold intolerance and heat intolerance  Genitourinary: Negative  Negative for dysuria, frequency and urgency  Musculoskeletal: Negative  Negative for myalgias and neck pain  Skin: Negative  Negative for rash  Neurological: Positive for tremors (ARE ABOUT THE SAME)  Negative for dizziness, seizures, syncope, facial asymmetry, speech difficulty, weakness, light-headedness, numbness and headaches  Hematological: Negative  Does not bruise/bleed easily  Psychiatric/Behavioral: Negative  Negative for confusion, hallucinations and sleep disturbance  FOCUSED PHYSICAL EXAMINATION:     Vital signs:  /64 (BP Location: Right arm, Patient Position: Sitting, Cuff Size: Large)   Pulse 68   Ht 5' 8" (1 727 m)   Wt 91 8 kg (202 lb 6 4 oz)   BMI 30 77 kg/m²      General:  Well-appearing, well nourished, pleasant patient in no acute distress  Mood appropriate  Head:  Normocephalic, atraumatic  Oropharynx and conjunctiva are clear  Speech  No hypophonia, no bradylalia  No scanning speech  Language: Comprehension intact  Neck:  Supple, strong 5/5 forward flexion and retroflexion  Extremities: Range of motion is normal       Cognitive and Mental Exam:  The patient is alert, oriented to self, location, date and situation  Cranial Nerves:  CN II:  Direct and consensual light reflexes were equally reactive to light symmetrically  No afferent pupillary defect   Visual fields are full to confrontation  CN III / IV / VI: Extraocular movements were full, with normal pursuit and saccades  CN V:   Facial sensation to light touch was intact  CN VII: Face is symmetric with normal strength  CN VIII: Hearing was not assessed  CN X:   Palate is up going bilaterally and symmetrically  CN XI:  Neck muscles are strong  CN XII: Tongue protrusion is at midline with normal movements  No dysarthria  Motor:    Tremor:  Left hand worse than R  Clenching the L hand fingers worsens finger flex-ext and better on hyperpronation of wrist, worst on supination  Activating the 3rd, 4th, and 5th digits appears to amplify the tremor and add finger flex-ext to it   This does not occur as strongly by gripping a cup with the thumb and index finger alone  [id number]                Time since last dose:   4/25/19 7/31/19 2/26/20   Speech  0  0  0   Facial Expression  0 0 0   Postural Tremor (Right) 2  2 2   Postural Tremor (Left) 3 With wrist flex-ext, flex-ext of the 3rd, 4th fingers  3 able to be triggered 3 affected fingers as before   Kinetic Tremor (Right)  2  2 2   Kinetic Tremor (Left)  3 3 3   Rest tremor amplitude RUE 0 0 0 zero rest tremor   Rest tremor amplitude LUE 0 0 0   Rest tremor amplitude RLE 0 0 0   Rest tremor amplitude LLE 0 0 0   Lip/Jaw Tremor  0 0 0   Consistency of tremor 0 0 0   Finger Taps (Right)   0 - -   Finger Taps (Left)  0 - -   Hand Movement (Right)  0 - -   Hand Movement (Left)   0 - -   Pronation/Supination (Right)  0 - -   Pronation/Supination (Left)   0 - -   Toe Tapping (Right) 0 - -   Toe Tapping (Left) 0 - -   Leg Agility (Right)  0 - -   Leg Agility (Left)   0  - -   Rigidity - Neck  1  - -   Rigidity - Upper Extremity (Right)  1   - -   Rigidity - Upper Extremity (Left)   0  - -   Rigidity - Lower Extremity (Right)  0 - -   Rigidity - Lower Extremity (Left)   0 - -   Arising from Chair   0  0 0    Gait   0  0 0   Freezing of Gait 0  0 0   Postural Stability  -  - 0   Posture 0  0 0   Global spontaneity of movement 0  0 0     -------------------------------------------------------------------------------------    Muscle Strength Right Left  Muscle Strength Right Left   Deltoid 5/5 5/5  Hip Adductors 5/5 5/5   Biceps 5/5 5/5  Hip Abductors 5/5 5/5   Triceps 5/5 5/5  Knee Extensors 5/5 5/5   Wrist Extensors 5/5 5/5  Knee Flexors 5/5 5/5   Wrist Flexors 5/5 5/5  Ankle Extensors 5/5 5/5    5/5 5/5  Ankle Flexors 5/5 5/5   Finger Abductors 5/5 5/5       Hip Flexors 5/5 5/5   Hip Extensors 5/5 5/5     Coordination:  Finger-to-nose-finger: normal except for mild bilateral intention tremor  Gait:  Normal gait, stance and turns        Reflexes:    Right Left   Biceps 1/4 1/4   Brachioradialis 1/4 1/4   Triceps 1/4 1/4   Knee 1/4 1/4   Ankle 1/4 1/4

## 2020-02-26 NOTE — PROGRESS NOTES
Review of Systems   Constitutional: Negative  Negative for appetite change and fever  HENT: Negative  Negative for hearing loss, tinnitus, trouble swallowing and voice change  Eyes: Negative for photophobia and pain  CATARACT IN RIGHT EYE   Respiratory: Negative  Negative for shortness of breath  Cardiovascular: Negative  Negative for palpitations  Gastrointestinal: Negative  Negative for nausea and vomiting  Endocrine: Negative  Negative for cold intolerance and heat intolerance  Genitourinary: Negative  Negative for dysuria, frequency and urgency  Musculoskeletal: Negative  Negative for myalgias and neck pain  Skin: Negative  Negative for rash  Neurological: Positive for tremors (ARE ABOUT THE SAME)  Negative for dizziness, seizures, syncope, facial asymmetry, speech difficulty, weakness, light-headedness, numbness and headaches  Hematological: Negative  Does not bruise/bleed easily  Psychiatric/Behavioral: Negative  Negative for confusion, hallucinations and sleep disturbance

## 2020-02-27 ENCOUNTER — TELEPHONE (OUTPATIENT)
Dept: NEUROLOGY | Facility: CLINIC | Age: 85
End: 2020-02-27

## 2020-02-27 PROBLEM — G24.8 ACQUIRED TORSION DYSTONIA: Status: ACTIVE | Noted: 2020-02-27

## 2020-02-27 NOTE — TELEPHONE ENCOUNTER
I retrieved a vm from [address] with [facility] - she stated that the prior auth request has been approved for 6 months and she will be faxing the approval letter to the office

## 2020-02-27 NOTE — TELEPHONE ENCOUNTER
----- Message from Tenna Phoenix sent at 2/27/2020  7:59 AM EST -----      ----- Message -----  From: Per Grossman MD  Sent: 2/27/2020   3:50 AM EST  To: Maria R Tony, we'd like to start approval process for botox inj to the left upper extremity, 100 units to start for [phone] please   Thanks

## 2020-02-28 ENCOUNTER — TELEPHONE (OUTPATIENT)
Dept: NEUROLOGY | Facility: CLINIC | Age: 85
End: 2020-02-28

## 2020-02-28 NOTE — TELEPHONE ENCOUNTER
I received a call from [place] with [facility] - I informed her that I needed to call in a new Botox prescription  I was transferred to [facility] - provided her with verbal order for Botox 100 Units with 3 refills

## 2020-02-28 NOTE — TELEPHONE ENCOUNTER
General 02/28/2020 10:36 AM Rossy Kaufman MA CARE [facility]ORDINATION -   Note    NEW START BOTOX 100 UNITS - AUTH# [id number]  2 VISITS, AV- 02/27/2020 TILL 08/27/2020    [facility]

## 2020-02-28 NOTE — TELEPHONE ENCOUNTER
Please schedule new start Botox at least two weeks out as this will be specialty pharmacy  Please let me know once the apt is scheduled so I can attach the referral     Fax approval letter received from [facility]:  Auth# [id number]  2 visits, AV- 02/27/2020 till 08/27/2020    Thank you!     Deidre

## 2020-02-28 NOTE — TELEPHONE ENCOUNTER
Spoke to Jose Luis Werner and schedule [facility] appointment 3/18/2020 in [place]    Per Dr Geri Bledsoe

## 2020-03-04 NOTE — TELEPHONE ENCOUNTER
I received a call from Ramila Bland with [facility] - she informed me that the Botox Mabeline Barnacle has been updated to pharmacy benefits  Auth# [id number] valid from 3/4/20 till 12/31/20  Michael Colby

## 2020-03-04 NOTE — TELEPHONE ENCOUNTER
Called [facility] and spoke to Malathi - she informed me that it's coming up in their system that a prior Zeus Dunbar is needed

## 2020-03-04 NOTE — TELEPHONE ENCOUNTER
Within3 and spoke to [place] - she informed me that the Benita Meigs was approved for buy and bill  I informed her that I requested for this to be through specialty pharmacy  She informed me that I would have to re-submit the auth and write up top that it's for pharmacy benefits    Fax re-submitted for pharmacy benefits

## 2020-03-04 NOTE — TELEPHONE ENCOUNTER
I received a call from [employer] with [facility] - she informed me that she was to schedule pt's Botox delivery  Botox delivery confirmed for Thursday 3/12/20 via [employer] priority signature required to hospitals location [address]  Please await Botox delivery  Thank you!     Deidre

## 2020-03-12 NOTE — TELEPHONE ENCOUNTER
Botox number of units: 200  Botox quantity: 1  Arrived at what location: [place]   Lot number: [id number]

## 2020-03-17 ENCOUNTER — TELEPHONE (OUTPATIENT)
Dept: NEUROLOGY | Facility: CLINIC | Age: 85
End: 2020-03-17

## 2020-03-17 NOTE — TELEPHONE ENCOUNTER
Patient appointment [id number] moved to [phone] from [address] [place] [address] will only bring spouse to appointment also he advised has not traveled internationally in the last 30 days nor has any fever cough sob

## 2020-03-18 ENCOUNTER — PROCEDURE VISIT (OUTPATIENT)
Dept: NEUROLOGY | Facility: CLINIC | Age: 85
End: 2020-03-18
Payer: COMMERCIAL

## 2020-03-18 VITALS
SYSTOLIC BLOOD PRESSURE: 126 MMHG | TEMPERATURE: 98.3 F | BODY MASS INDEX: 30.32 KG/M2 | WEIGHT: 199.4 LBS | HEART RATE: 66 BPM | DIASTOLIC BLOOD PRESSURE: 52 MMHG

## 2020-03-18 DIAGNOSIS — G25.2 DYSTONIC TREMOR: ICD-10-CM

## 2020-03-18 DIAGNOSIS — G24.8 ACQUIRED TORSION DYSTONIA: Primary | ICD-10-CM

## 2020-03-19 PROCEDURE — 64642 CHEMODENERV 1 EXTREMITY 1-4: CPT | Performed by: PSYCHIATRY & NEUROLOGY

## 2020-03-19 PROCEDURE — 95874 GUIDE NERV DESTR NEEDLE EMG: CPT | Performed by: PSYCHIATRY & NEUROLOGY

## 2020-03-19 NOTE — PROGRESS NOTES
Chemodenervation  Date/Time: 3/19/2020 1:17 AM  Performed by: Kala Gill MD  Authorized by: Kala Gill MD     Pre-procedure details:     Preparation: Patient was prepped and draped in usual sterile fashion      Prepped With: Alcohol    Anesthesia (see MAR for exact dosages): Anesthesia method:  Topical application  Procedure details:     Position:  Upright    Guidance: EMG    Botox:     Botox Type:  Type A    Brand:  Botox    Botulinum toxin total units: 100 units  mL's of preservative free sterile saline:  1    Final Concentration per CC:  100 units    Needle Gauge:  30 G 2 5 inch    Medication Administration:  100 Units onabotulinumtoxin A 100 units  Procedures:     Botox Procedures: upper extremity dystonia      Date of assessment: 02/26/19  Last date: none  Injection Location:   Upper Limb:  L flexor pollicis longus, L flexor digitorum profundus, L flexor digitorum superficialis and L flexor carpi ulnaris  Total Units:     Botulinum toxin total units: 100 units  Post-procedure details:     Chemodenervation:  One extremity, 1-4 muscles    Extremity Location (1-4 Muscles)::  Left upper extremity    Patient tolerance of procedure: Tolerated well, no immediate complications  Comments:      Exam: wrist flexion-extension low frequency tremor upon thumb and forefinger flexion and flexion of wrist      Left  7 5 units x 1 site =   7 5 was injected Flexor Pollicis Longus  7 5 unit(s) x 2 site =    15 was injected into the Flexor Digitorum Superficialis  7 5 unit(s) x 1 site =    7 5 was injected into the Flexor Digitorum Profundus  10 unit(s) x 1 sites =   10 was injected into the Flexor Carpi Ulnaris    40 units used, 60 units discarded

## 2020-03-19 NOTE — PATIENT INSTRUCTIONS
OnabotulinumtoxinA (By injection)   OnabotulinumtoxinA ([id number]-p-jxa-py-PGV-xny-tox-in-ay)  Treats muscle stiffness, muscle spasms, excessive sweating, overactive bladder, or loss of bladder control  Prevents chronic migraine headaches  Improves the appearance of wrinkles on the face  Brand Name(s): Botox, Botox Cosmetic   There may be other brand names for this medicine  When This Medicine Should Not Be Used: This medicine is not right for everyone  You should not receive this medicine if you had an allergic reaction to onabotulinumtoxinA or any other botulinum toxin product  How to Use This Medicine:   Injectable  · Your doctor will prescribe your exact dose and tell you how often it should be given  This medicine is given by a healthcare provider as a shot under your skin or into a muscle  · You may be given medicine to numb the area where the shot will be injected  If you receive the medicine around your eyes, you may be given eye drops or ointment to numb the area  After your injection, you may need to wear a protective contact lens or eye patch  · If you are being treated for excessive sweating, shave your underarms but do not use deodorant for 24 hours before your injection  Avoid exercise, hot foods or liquids, or anything else that could make you sweat for 30 minutes before your injection  · The recommended treatment schedule for chronic migraine is every 12 weeks  · This medicine works slowly  Once your condition has improved, the medicine will last about 3 months, then the effects will slowly go away  You might need more injections to treat your condition  ¨ Muscle spasms in the eyelids should improve within 3 to 10 days  ¨ Eye muscle problems should improve 1 or 2 days after the injection, and the improvement should last for 2 to 6 weeks  ¨ Neck pain should improve within 2 to 6 weeks  ¨ Arm stiffness should improve within 4 to 6 weeks    ¨ Facial lines or wrinkles should improve 1 or 2 days   · This medicine should come with a Medication Guide  Ask your pharmacist for a copy if you do not have one  · Missed dose:Call your doctor or pharmacist for instructions  Drugs and Foods to Avoid:   Ask your doctor or pharmacist before using any other medicine, including over-the-counter medicines, vitamins, and herbal products  · Some foods and medicine can affect how onabotulinumtoxinA works  Tell your doctor if you are using any of the following:  ¨ Aspirin or a blood thinner (such as ticlopidine, warfarin)  ¨ Muscle relaxer  ¨ Medicine for an infection (such as amikacin, gentamicin, streptomycin, tobramycin)  · Tell your doctor if you have received an injection of any botulinum toxin product within the past 4 months  Warnings While Using This Medicine:   · Tell your doctor if you are pregnant or breastfeeding, or if you have breathing or lung problems, bleeding problems, heart or blood vessel disease, or nerve or muscle problems (such as myasthenia gravis)  Tell your doctor if you have ever had face surgery or if you have a urinary tract infection or trouble urinating, diabetes, or multiple sclerosis  · This medicine may cause the following problems:  ¨ Muscle weakness, loss of bladder control, trouble swallowing, speaking, or breathing (caused by the toxin spreading to other parts of your body)  · This medicine may make your muscles weak or cause vision problems  Do not drive or do anything else that could be dangerous until you know how this medicine affects you  · There are some warnings that only apply if you are receiving this medicine to treat the following:   ¨ Injections near the eye: This medicine may reduce blinking, which can raise the risk of eye problems such as corneal exposure and ulcers  Tell your doctor right away if you notice that you are blinking less than usual or your eyes feel dry  ¨ Urinary incontinence:  This medicine may cause autonomic dysreflexia, which can be a life-threatening condition  ¨ Overactive bladder: Check with your doctor right away if you have trouble urinating or a burning sensation while urinating  · This medicine contains products from donated human blood, so it may contain viruses, although the risk is low  Human donors and blood are always tested for viruses to keep the risk low  Talk with your doctor about this risk if you are concerned  · Your doctor will check your progress and the effects of this medicine at regular visits  Keep all appointments  Possible Side Effects While Using This Medicine:   Call your doctor right away if you notice any of these side effects:  · Allergic reaction: Itching or hives, swelling in your face or hands, swelling or tingling in your mouth or throat, chest tightness, trouble breathing  · Blurred or double vision, droopy eyelids  · Change in how much or how often you urinate, trouble urinating, or painful urination  · Chest pain, slow or uneven heartbeat  · Headache, increased sweating, warmth or redness in your face, neck, or arm  · Muscle weakness  · Trouble swallowing, talking, or breathing  If you notice these less serious side effects, talk with your doctor:   · Fever, chills, cough, stuffy or runny nose, sore throat, and body aches  · Pain in your neck, back, arms, or legs  · Redness, pain, tenderness, bruising, swelling, or weakness where the shot was given  If you notice other side effects that you think are caused by this medicine, tell your doctor  Call your doctor for medical advice about side effects  You may report side effects to FDA at 6-499-FDA-6833  © 2017 [employer] Information is for End User's use only and may not be sold, redistributed or otherwise used for commercial purposes  The above information is an  only  It is not intended as medical advice for individual conditions or treatments   Talk to your doctor, nurse or pharmacist before following any medical regimen to see if it is safe and effective for you

## 2020-04-23 ENCOUNTER — TELEPHONE (OUTPATIENT)
Dept: NEUROLOGY | Facility: CLINIC | Age: 85
End: 2020-04-23

## 2020-04-23 DIAGNOSIS — G25.2 DYSTONIC TREMOR: Primary | ICD-10-CM

## 2020-04-30 ENCOUNTER — TELEPHONE (OUTPATIENT)
Dept: NEUROLOGY | Facility: CLINIC | Age: 85
End: 2020-04-30

## 2020-04-30 DIAGNOSIS — G56.00 MEDIAN NERVE COMPRESSION: Primary | ICD-10-CM

## 2020-04-30 RX ORDER — PREGABALIN 25 MG/1
CAPSULE ORAL
Qty: 240 CAPSULE | Refills: 0 | Status: SHIPPED | OUTPATIENT
Start: 2020-04-30 | End: 2020-06-19 | Stop reason: ALTCHOICE

## 2020-05-15 ENCOUNTER — TELEPHONE (OUTPATIENT)
Dept: NEUROLOGY | Facility: CLINIC | Age: 85
End: 2020-05-15

## 2020-05-27 ENCOUNTER — TELEPHONE (OUTPATIENT)
Dept: NEUROLOGY | Facility: CLINIC | Age: 85
End: 2020-05-27

## 2020-05-28 DIAGNOSIS — B34.9 VIRAL DISEASE: ICD-10-CM

## 2020-05-28 PROCEDURE — U0003 INFECTIOUS AGENT DETECTION BY NUCLEIC ACID (DNA OR RNA); SEVERE ACUTE RESPIRATORY SYNDROME CORONAVIRUS 2 (SARS-COV-2) (CORONAVIRUS DISEASE [COVID-19]), AMPLIFIED PROBE TECHNIQUE, MAKING USE OF HIGH THROUGHPUT TECHNOLOGIES AS DESCRIBED BY CMS-2020-01-R: HCPCS

## 2020-05-29 RX ORDER — DIPHENHYDRAMINE HYDROCHLORIDE 25 MG/1
10 TABLET ORAL DAILY
Status: ON HOLD | COMMUNITY
End: 2021-07-17 | Stop reason: CLARIF

## 2020-05-29 RX ORDER — KELP 150 MCG
TABLET ORAL DAILY
Status: ON HOLD | COMMUNITY
End: 2021-07-17 | Stop reason: CLARIF

## 2020-05-29 RX ORDER — DIAPER,BRIEF,ADULT, DISPOSABLE
EACH MISCELLANEOUS DAILY
Status: ON HOLD | COMMUNITY
End: 2021-07-17 | Stop reason: CLARIF

## 2020-05-30 LAB — SARS-COV-2 RNA SPEC QL NAA+PROBE: NOT DETECTED

## 2020-06-02 ENCOUNTER — ANESTHESIA EVENT (OUTPATIENT)
Dept: PERIOP | Facility: HOSPITAL | Age: 85
End: 2020-06-02
Payer: COMMERCIAL

## 2020-06-03 ENCOUNTER — HOSPITAL ENCOUNTER (OUTPATIENT)
Facility: HOSPITAL | Age: 85
Setting detail: OUTPATIENT SURGERY
Discharge: HOME | End: 2020-06-03
Attending: PLASTIC SURGERY | Admitting: PLASTIC SURGERY
Payer: COMMERCIAL

## 2020-06-03 ENCOUNTER — ANESTHESIA (OUTPATIENT)
Dept: PERIOP | Facility: HOSPITAL | Age: 85
End: 2020-06-03
Payer: COMMERCIAL

## 2020-06-03 VITALS
DIASTOLIC BLOOD PRESSURE: 78 MMHG | SYSTOLIC BLOOD PRESSURE: 164 MMHG | RESPIRATION RATE: 16 BRPM | OXYGEN SATURATION: 97 % | TEMPERATURE: 96.6 F | HEART RATE: 67 BPM

## 2020-06-03 DIAGNOSIS — B34.9 VIRAL DISEASE: Primary | ICD-10-CM

## 2020-06-03 PROBLEM — D49.2 NEOPLASM OF UNSPECIFIED BEHAVIOR OF BONE, SOFT TISSUE, AND SKIN: Status: ACTIVE | Noted: 2020-06-03

## 2020-06-03 RX ORDER — SODIUM CHLORIDE, SODIUM LACTATE, POTASSIUM CHLORIDE, CALCIUM CHLORIDE 600; 310; 30; 20 MG/100ML; MG/100ML; MG/100ML; MG/100ML
50 INJECTION, SOLUTION INTRAVENOUS CONTINUOUS
Status: DISCONTINUED | OUTPATIENT
Start: 2020-06-03 | End: 2020-06-03 | Stop reason: HOSPADM

## 2020-06-03 RX ORDER — HYDROCODONE BITARTRATE AND ACETAMINOPHEN 5; 325 MG/1; MG/1
1 TABLET ORAL EVERY 6 HOURS PRN
Status: DISCONTINUED | OUTPATIENT
Start: 2020-06-03 | End: 2020-06-03 | Stop reason: HOSPADM

## 2020-06-03 RX ORDER — SODIUM CHLORIDE, SODIUM LACTATE, POTASSIUM CHLORIDE, CALCIUM CHLORIDE 600; 310; 30; 20 MG/100ML; MG/100ML; MG/100ML; MG/100ML
INJECTION, SOLUTION INTRAVENOUS CONTINUOUS PRN
Status: DISCONTINUED | OUTPATIENT
Start: 2020-06-03 | End: 2020-06-03 | Stop reason: SURG

## 2020-06-03 RX ORDER — LIDOCAINE HYDROCHLORIDE AND EPINEPHRINE 5; 5 MG/ML; UG/ML
INJECTION, SOLUTION INFILTRATION; PERINEURAL AS NEEDED
Status: DISCONTINUED | OUTPATIENT
Start: 2020-06-03 | End: 2020-06-03 | Stop reason: HOSPADM

## 2020-06-03 RX ORDER — LIDOCAINE HYDROCHLORIDE 10 MG/ML
INJECTION, SOLUTION EPIDURAL; INFILTRATION; INTRACAUDAL; PERINEURAL AS NEEDED
Status: DISCONTINUED | OUTPATIENT
Start: 2020-06-03 | End: 2020-06-03 | Stop reason: SURG

## 2020-06-03 RX ORDER — ONDANSETRON 2 MG/ML
4 INJECTION INTRAMUSCULAR; INTRAVENOUS ONCE AS NEEDED
Status: DISCONTINUED | OUTPATIENT
Start: 2020-06-03 | End: 2020-06-03 | Stop reason: HOSPADM

## 2020-06-03 RX ORDER — MAGNESIUM HYDROXIDE 1200 MG/15ML
LIQUID ORAL AS NEEDED
Status: DISCONTINUED | OUTPATIENT
Start: 2020-06-03 | End: 2020-06-03 | Stop reason: HOSPADM

## 2020-06-03 RX ORDER — HYDROMORPHONE HCL/PF 1 MG/ML
0.5 SYRINGE (ML) INJECTION
Status: DISCONTINUED | OUTPATIENT
Start: 2020-06-03 | End: 2020-06-03 | Stop reason: HOSPADM

## 2020-06-03 RX ORDER — ONDANSETRON 2 MG/ML
INJECTION INTRAMUSCULAR; INTRAVENOUS AS NEEDED
Status: DISCONTINUED | OUTPATIENT
Start: 2020-06-03 | End: 2020-06-03 | Stop reason: SURG

## 2020-06-03 RX ORDER — MIDAZOLAM HYDROCHLORIDE 2 MG/2ML
INJECTION, SOLUTION INTRAMUSCULAR; INTRAVENOUS AS NEEDED
Status: DISCONTINUED | OUTPATIENT
Start: 2020-06-03 | End: 2020-06-03 | Stop reason: SURG

## 2020-06-03 RX ORDER — PROPOFOL 10 MG/ML
INJECTION, EMULSION INTRAVENOUS AS NEEDED
Status: DISCONTINUED | OUTPATIENT
Start: 2020-06-03 | End: 2020-06-03 | Stop reason: SURG

## 2020-06-03 RX ORDER — PROPOFOL 10 MG/ML
INJECTION, EMULSION INTRAVENOUS CONTINUOUS PRN
Status: DISCONTINUED | OUTPATIENT
Start: 2020-06-03 | End: 2020-06-03 | Stop reason: SURG

## 2020-06-03 RX ORDER — DEXAMETHASONE SODIUM PHOSPHATE 4 MG/ML
INJECTION, SOLUTION INTRA-ARTICULAR; INTRALESIONAL; INTRAMUSCULAR; INTRAVENOUS; SOFT TISSUE AS NEEDED
Status: DISCONTINUED | OUTPATIENT
Start: 2020-06-03 | End: 2020-06-03 | Stop reason: SURG

## 2020-06-03 RX ORDER — ONDANSETRON 4 MG/1
4 TABLET, ORALLY DISINTEGRATING ORAL EVERY 6 HOURS PRN
Status: DISCONTINUED | OUTPATIENT
Start: 2020-06-03 | End: 2020-06-03 | Stop reason: HOSPADM

## 2020-06-03 RX ORDER — GINSENG 100 MG
CAPSULE ORAL AS NEEDED
Status: DISCONTINUED | OUTPATIENT
Start: 2020-06-03 | End: 2020-06-03 | Stop reason: HOSPADM

## 2020-06-03 RX ORDER — CEFAZOLIN SODIUM 2 G/50ML
2000 SOLUTION INTRAVENOUS ONCE
Status: COMPLETED | OUTPATIENT
Start: 2020-06-03 | End: 2020-06-03

## 2020-06-03 RX ORDER — FENTANYL CITRATE 50 UG/ML
INJECTION, SOLUTION INTRAMUSCULAR; INTRAVENOUS AS NEEDED
Status: DISCONTINUED | OUTPATIENT
Start: 2020-06-03 | End: 2020-06-03 | Stop reason: SURG

## 2020-06-03 RX ADMIN — LIDOCAINE HYDROCHLORIDE 50 MG: 10 INJECTION, SOLUTION EPIDURAL; INFILTRATION; INTRACAUDAL; PERINEURAL at 13:55

## 2020-06-03 RX ADMIN — MIDAZOLAM HYDROCHLORIDE 1 MG: 1 INJECTION, SOLUTION INTRAMUSCULAR; INTRAVENOUS at 13:52

## 2020-06-03 RX ADMIN — PHENYLEPHRINE HYDROCHLORIDE 100 MCG: 10 INJECTION INTRAVENOUS at 14:20

## 2020-06-03 RX ADMIN — PROPOFOL 50 MG: 10 INJECTION, EMULSION INTRAVENOUS at 13:55

## 2020-06-03 RX ADMIN — PROPOFOL 75 MCG/KG/MIN: 10 INJECTION, EMULSION INTRAVENOUS at 13:55

## 2020-06-03 RX ADMIN — FENTANYL CITRATE 50 MCG: 50 INJECTION INTRAMUSCULAR; INTRAVENOUS at 13:55

## 2020-06-03 RX ADMIN — SODIUM CHLORIDE, SODIUM LACTATE, POTASSIUM CHLORIDE, AND CALCIUM CHLORIDE: .6; .31; .03; .02 INJECTION, SOLUTION INTRAVENOUS at 13:45

## 2020-06-03 RX ADMIN — CEFAZOLIN SODIUM 2000 MG: 2 SOLUTION INTRAVENOUS at 13:55

## 2020-06-03 RX ADMIN — ONDANSETRON HYDROCHLORIDE 4 MG: 2 INJECTION, SOLUTION INTRAMUSCULAR; INTRAVENOUS at 14:14

## 2020-06-03 RX ADMIN — MIDAZOLAM HYDROCHLORIDE 1 MG: 1 INJECTION, SOLUTION INTRAMUSCULAR; INTRAVENOUS at 13:55

## 2020-06-03 RX ADMIN — DEXAMETHASONE SODIUM PHOSPHATE 4 MG: 4 INJECTION, SOLUTION INTRA-ARTICULAR; INTRALESIONAL; INTRAMUSCULAR; INTRAVENOUS; SOFT TISSUE at 13:55

## 2020-06-19 ENCOUNTER — PROCEDURE VISIT (OUTPATIENT)
Dept: NEUROLOGY | Facility: CLINIC | Age: 85
End: 2020-06-19
Payer: COMMERCIAL

## 2020-06-19 VITALS — SYSTOLIC BLOOD PRESSURE: 137 MMHG | HEART RATE: 64 BPM | DIASTOLIC BLOOD PRESSURE: 62 MMHG | TEMPERATURE: 98.8 F

## 2020-06-19 DIAGNOSIS — G24.8 ACQUIRED TORSION DYSTONIA: Primary | ICD-10-CM

## 2020-06-19 PROCEDURE — 64642 CHEMODENERV 1 EXTREMITY 1-4: CPT | Performed by: PSYCHIATRY & NEUROLOGY

## 2020-06-19 PROCEDURE — 95874 GUIDE NERV DESTR NEEDLE EMG: CPT | Performed by: PSYCHIATRY & NEUROLOGY

## 2020-06-19 RX ORDER — PRIMIDONE 50 MG/1
50 TABLET ORAL EVERY 12 HOURS SCHEDULED
COMMUNITY
End: 2020-07-06 | Stop reason: SDUPTHER

## 2020-06-22 ENCOUNTER — TELEPHONE (OUTPATIENT)
Dept: NEUROLOGY | Facility: CLINIC | Age: 85
End: 2020-06-22

## 2020-06-30 NOTE — PROGRESS NOTES
DEPARTMENT OF NEUROLOGICAL SCIENCES  [address] [facility]        RETURN PATIENT NOTE    Patient: Alexei Lanza  Medical Record Number: # [id number]  YOB: 1935  Date of visit: 7/2/2020    Referring provider: No ref  provider found    ASSESSMENT     Diagnoses for this encounter:  1  Acquired torsion dystonia     2  Dystonic tremor        Impression of this 83 yo gentleman returning for likely 35+ years of suspected dystonic hand tremor, a positive family history of tremor, and s/p two sessions of botulinum toxin injections  He feels after the second set only two weeks ago that his L hand tremor has reduced by 25-50% in intensity upon finger pressure, but with the downside of weakened hand  strength on the same hand  He has trouble with fine finger movements as a result and holding his bow steady  He would like to continue with injections in the future with further adjustments of dose, but would like to skip the next set of injections several months from now, due to his wish of maintaining hand strength in time for hunting season  On examination he does have hyperextension of the L 3rd-5th fingers, the same fingers most in action and there does appear to be a null point of sorts when asked to hyperpronate the wrist, and worsens on supination at least on the L hand  Historically he has no response to some medications for essential tremor (ET)  PLAN     · He would like to hold off having the next round of botulinum toxin injections in Aug/Sept 2020 in preparation for hunting season when he requires his full strength  · Return as scheduled in Sept 2020    A total of 25 minutes were spent face-to-face with this patient    HISTORY OF PRESENT ILLNESS:     Mr Meghana Wild is a 82 y o  right handed male who returns for positional tremors, likely dystonic hand tremor   Last visit 2/26/2020, but botulinum toxin sessions were on 3/18/20 and 6/19/20    The patient was accompanied today  History was obtained from patient and spouse    Interim History  Please review procedure notes in interim  To summarize, he felt no subjective nor objective difference with tremor after first set of injections and dose was increased on the second set  Today, he feels the last session was partially successful  He still has the tremor but estimates it is decreased by 25-50% now, specifically the distal shaking when he presses his thumb and forefinger together  However he felt weakness in his L hand  such that he has trouble gripping his bow or his other hand while pistol shooting  No resting tremor component  He requests that the next set of injections be skipped in time for hunting season as he would prefer to have the hand strength during that time before further adjustments are made  To review:   Primidone - higher amounts worsen his tremors, no tangible benefit at any level  Gabapentin - trouble with tolerance beyond 200mg a day   Topiramate - never tried due to sulfa allergy  Propranolol - he had been on it previously to unknown dose and unknown length of time  INITIAL HISTORY  Main bothersome neurological symptoms today are:   1  Bilateral hand tremor  Per Chart review, he has been evaluated by Neurology at [facility] before with Dr Paulo Lynch and Dr Phoebe Kong  He started having tremors in his mid 45s in the R hand  He noticed it while he was trying to write - he would be able to touch his R hand with his L and then the tremors were better  He has difficulty with holding a cup when he  the cup - keeping the 3rd-5th fingers straight avoids triggering tremor  When he attempts to shoot with his handgun, his hands will shake more with the added   He uses two hands to control his mouse on the computer  Working with a screwdriver is very difficult as well as fine motor movements  One Kina Garcia) sister with similar type of tremor and taking primidone   He feels his tremors have overall progressed over the years, moreso in the L than the R hand  He was diagnosed as benign essential tremor but no clear benefit on primidone  Pt feels tremors worsen when he holds onto objects  Tremor interferes with his sport hunting  He was attempted on propranolol and there was discussion about focused ultrasound therapy for treatment of medication-refractory essential tremor as patient was not interested in deep brain stimulation at the time  He was not started on topiramate due to his reported history of severe sulfa allergy  He drinks decaffeinated coffee and tea  He does not feel he was on propranolol for very long and he does not know what was the maximum dose or effect he had  He denies injury to the hand or wrists  He had a "couple of head injuries" with a skull fracture from slipping [place] in 2014, another when he sustained a frontal injury from hitting his head on a tailgate of truck       REVIEW OF PAST MEDICAL, SOCIAL AND FAMILY HISTORY:  This is the list of problems as per our Medical Records:    Patient Active Problem List    Diagnosis Date Noted    Neoplasm of unspecified behavior of bone, soft tissue, 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/2020    Acquired torsion dystonia 02/27/2020    Tremor, essential 07/31/2019    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    Asthma 04/25/2019    Hypertension 04/25/2019    Lateral epicondylitis 04/25/2019    Localized primary osteoarthritis of wrist 04/25/2019    Lumbosacral spondylosis without myelopathy 04/25/2019    Primary osteoarthritis of left shoulder 04/25/2019    Carpal tunnel syndrome 10/05/2017    Cubital tunnel syndrome 10/05/2017    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    Neuropathy of both feet 11/20/2015    Benign essential hypertension 06/22/2010    ED (erectile dysfunction) of organic origin 06/22/2010    Lumbar spondylosis with myelopathy 07/28/2008    Spinal stenosis of lumbar region 04/23/2008    Low back pain 04/03/2008     Allergies   Allergen Reactions    Sulfa Antibiotics Other (See Comments)     pancreatitis    Other reaction(s):  Other (Please comment)  Pancreas destroy itself      Outpatient Encounter Medications as of 7/2/2020   Medication Sig Dispense Refill    albuterol (PROVENTIL HFA,VENTOLIN HFA) 90 mcg/act inhaler       amLODIPine (NORVASC) 10 mg tablet Take 10 mg by mouth daily       Ascorbic Acid (VITAMIN C) 1000 MG tablet Take 500 mg by mouth 2 (two) times a day before lunch and dinner       ASPIRIN 81 PO Take 81 mg by mouth daily       Biotin 5 MG CAPS Take 10 mg by mouth daily       carvedilol (COREG) 6 25 mg tablet Take 6 25 mg by mouth 2 (two) times a day with meals       chlorthalidone 25 mg tablet Take 25 mg by mouth daily       Cholecalciferol (VITAMIN D3 PO) Take 4,000 tablets by mouth daily       cloNIDine (CATAPRES) 0 1 mg tablet Take 0 1 mg by mouth daily at bedtime       Coenzyme Q10 (CO Q 10) 10 MG CAPS Take 200 mg by mouth 2 (two) times a day       Flaxseed, Linseed, (FLAXSEED OIL) 1000 MG CAPS Take 1 capsule by mouth daily       FLOVENT DISKUS 250 MCG/BLIST AEPB 1 puff a day in the morning      furosemide (LASIX) 20 mg tablet Take 20 mg by mouth daily       Kelp 150 MCG TABS Take by mouth daily      levothyroxine 75 mcg tablet Take 75 mcg by mouth daily       Multiple Vitamin (MULTI VITAMIN DAILY PO) Take 1 capsule by mouth daily       Multiple Vitamins-Minerals (PRESERVISION AREDS PO) Take 1 tablet by mouth 2 (two) times a day       Omega-3 Fatty Acids (FISH OIL PO) Take 5,000 mg by mouth      oxyCODONE (ROXICODONE) 5 mg immediate release tablet as needed       primidone (MYSOLINE) 50 mg tablet Take 50 mg by mouth every 12 (twelve) hours      Red Yeast Rice 600 MG CAPS Take 600 mg by mouth Pt takes 4 per day      simvastatin (ZOCOR) 10 mg tablet Take 10 mg by mouth daily at bedtime       Vitamin E 400 units TABS Take 800 Units by mouth daily       amoxicillin (AMOXIL) 500 mg capsule       Lecithin 1200 MG CAPS Take by mouth 2 (two) times a day       No facility-administered encounter medications on file as of 7/2/2020  REVIEW OF SYSTEMS:  The patient has entered data on an intake form regarding present illness, past medical and surgical history, medications, allergies, family and social history, and a full review of 14 systems  I have reviewed this form with the patient, and all the relevant information has been included on this note  The full review of systems was negative except as stated in HPI and below  Constitutional: Negative  Negative for appetite change and fever  HENT: Negative  Negative for hearing loss, tinnitus, trouble swallowing and voice change  Eyes: Negative for photophobia and pain  CATARACT IN RIGHT EYE   Respiratory: Negative  Negative for shortness of breath  Cardiovascular: Negative  Negative for palpitations  Gastrointestinal: Negative  Negative for nausea and vomiting  Endocrine: Negative  Negative for cold intolerance and heat intolerance  Genitourinary: Negative  Negative for dysuria, frequency and urgency  Musculoskeletal: Negative  Negative for myalgias and neck pain  Skin: Negative  Negative for rash  Neurological: Positive for tremors (ARE ABOUT THE SAME)  Negative for dizziness, seizures, syncope, facial asymmetry, speech difficulty, weakness, light-headedness, numbness and headaches  Hematological: Negative  Does not bruise/bleed easily  Psychiatric/Behavioral: Negative  Negative for confusion, hallucinations and sleep disturbance  FOCUSED PHYSICAL EXAMINATION:     Vital signs:  /68 (BP Location: Right arm, Patient Position: Sitting, Cuff Size: Adult)   Temp 97 9 °F (36 6 °C)   Wt 91 6 kg (202 lb)   BMI 30 71 kg/m²     General:  Well-appearing, well nourished, pleasant patient in no acute distress  Mood appropriate  Head:  Normocephalic, atraumatic   Oropharynx and conjunctiva are clear  Speech  No hypophonia, no bradylalia  No scanning speech  Language: Comprehension intact  Neck:  Supple, strong 5/5 forward flexion and retroflexion  Extremities: Range of motion is normal       Cognitive and Mental Exam:  The patient is alert, oriented to self, location, date and situation  Cranial Nerves:  CN II:  Direct and consensual light reflexes were equally reactive to light symmetrically  No afferent pupillary defect   Visual fields are full to confrontation  CN III / IV / VI: Extraocular movements were full, with normal pursuit and saccades  CN V:   Facial sensation to light touch was intact  CN VII: Face is symmetric with normal strength  CN VIII: Hearing was not assessed  CN X:   Palate is up going bilaterally and symmetrically  CN XI:  Neck muscles are strong  CN XII: Tongue protrusion is at midline with normal movements  No dysarthria  Motor:    Tremor:  His left hand has about same level of tremor as R  Triggered by primarily in flexion of the first and second fingers (e g  Pinching them together) with resulting flexion-extension of rest of fingers  Improved on hyperpronation of wrist, worst on supination  This does not occur as strongly by gripping a cup with the thumb and index finger alone       [id number]                Time since last dose:   4/25/19 7/31/19 2/26/20   Speech  0  0  0   Facial Expression  0 0 0   Postural Tremor (Right) 2  2 2   Postural Tremor (Left) 3 With wrist flex-ext, flex-ext of the 3rd, 4th fingers  3 able to be triggered 3 affected fingers as before   Kinetic Tremor (Right)  2  2 2   Kinetic Tremor (Left)  3 3 3   Rest tremor amplitude RUE 0 0 0 zero rest tremor   Rest tremor amplitude LUE 0 0 0   Rest tremor amplitude RLE 0 0 0   Rest tremor amplitude LLE 0 0 0   Lip/Jaw Tremor  0 0 0   Consistency of tremor 0 0 0   Finger Taps (Right)   0 - -   Finger Taps (Left)  0 - -   Hand Movement (Right)  0 - -   Hand Movement (Left)   0 - -   Pronation/Supination (Right)  0 - -   Pronation/Supination (Left)   0 - -   Toe Tapping (Right) 0 - -   Toe Tapping (Left) 0 - -   Leg Agility (Right)  0 - -   Leg Agility (Left)   0  - -   Rigidity - Neck  1  - -   Rigidity - Upper Extremity (Right)  1   - -   Rigidity - Upper Extremity (Left)   0  - -   Rigidity - Lower Extremity (Right)  0 - -   Rigidity - Lower Extremity (Left)   0 - -   Arising from Chair   0  0 0    Gait   0  0 0   Freezing of Gait 0  0 0   Postural Stability  -  - 0   Posture 0  0 0   Global spontaneity of movement 0  0 0     -------------------------------------------------------------------------------------    Muscle Strength Right Left  Muscle Strength Right Left   Deltoid 5/5 5/5  Hip Adductors 5/5 5/5   Biceps 5/5 5/5  Hip Abductors 5/5 5/5   Triceps 5/5 5/5  Knee Extensors 5/5 5/5   Wrist Extensors 5/5 5/5  Knee Flexors 5/5 5/5   Wrist Flexors 5/5 5/5  Ankle Extensors 5/5 5/5    5/5 5/5  Ankle Flexors 5/5 5/5   Finger Abductors 5/5 5/5       Hip Flexors 5/5 5/5   Hip Extensors 5/5 5/5     Coordination:  Finger-to-nose-finger: normal except for mild bilateral intention tremor  Gait:  Normal gait, stance and turns        Reflexes:    Right Left   Biceps 1/4 1/4   Brachioradialis 1/4 1/4   Triceps 1/4 1/4   Knee 1/4 1/4   Ankle 1/4 1/4

## 2020-07-02 ENCOUNTER — OFFICE VISIT (OUTPATIENT)
Dept: NEUROLOGY | Facility: CLINIC | Age: 85
End: 2020-07-02
Payer: COMMERCIAL

## 2020-07-02 VITALS
SYSTOLIC BLOOD PRESSURE: 142 MMHG | BODY MASS INDEX: 30.71 KG/M2 | DIASTOLIC BLOOD PRESSURE: 68 MMHG | TEMPERATURE: 97.9 F | WEIGHT: 202 LBS

## 2020-07-02 DIAGNOSIS — G24.8 ACQUIRED TORSION DYSTONIA: Primary | ICD-10-CM

## 2020-07-02 DIAGNOSIS — G25.2 DYSTONIC TREMOR: ICD-10-CM

## 2020-07-02 PROCEDURE — 99214 OFFICE O/P EST MOD 30 MIN: CPT | Performed by: PSYCHIATRY & NEUROLOGY

## 2020-07-02 NOTE — PROGRESS NOTES
Review of Systems   Constitutional: Negative  Negative for appetite change and fever  HENT: Negative  Negative for hearing loss, tinnitus, trouble swallowing and voice change  Eyes: Negative  Negative for photophobia and pain  Respiratory: Negative  Negative for shortness of breath  Cardiovascular: Negative  Negative for palpitations  Gastrointestinal: Negative  Negative for nausea and vomiting  Endocrine: Negative  Negative for cold intolerance  Genitourinary: Negative  Negative for dysuria, frequency and urgency  Musculoskeletal: Positive for back pain  Negative for myalgias and neck pain  Skin: Negative  Negative for rash  Neurological: Negative  Negative for dizziness, tremors, seizures, syncope, facial asymmetry, speech difficulty, weakness, light-headedness, numbness and headaches  Hematological: Negative  Does not bruise/bleed easily  Psychiatric/Behavioral: Negative  Negative for confusion, hallucinations and sleep disturbance

## 2020-07-02 NOTE — LETTER
July 3, 2020     [address] [place] [id number]    Patient: Silvana Ponce   YOB: 1935   Date of Visit: 7/2/2020       Dear Dr Sellers Sees:    Earlier yesterday I saw Mr Leon Wilcox for follow-up of suspected dystonia and dystonic hand tremor  Below are my notes for this visit for your records and to keep you updated on his health status  If you have questions, please do not hesitate to call me  Sincerely,        Emily Grimes MD        CC: MD Emily Abebe MD  7/3/2020 10:05 AM  Sign at close encounter  [address] and [address] [facility] PATIENT NOTE    Patient: Silvana Ponce  Medical Record Number: # [id number]  YOB: 1935  Date of visit: 7/2/2020    Referring provider: No ref  provider found    ASSESSMENT     Diagnoses for this encounter:  1  Acquired torsion dystonia     2  Dystonic tremor        Impression of this 81 yo gentleman returning for likely 35+ years of suspected dystonic hand tremor, a positive family history of tremor, and s/p two sessions of botulinum toxin injections  He feels after the second set only two weeks ago that his L hand tremor has reduced by 25-50% in intensity upon finger pressure, but with the downside of weakened hand  strength on the same hand  He has trouble with fine finger movements as a result and holding his bow steady  He would like to continue with injections in the future with further adjustments of dose, but would like to skip the next set of injections several months from now, due to his wish of maintaining hand strength in time for hunting season  On examination he does have hyperextension of the L 3rd-5th fingers, the same fingers most in action and there does appear to be a null point of sorts when asked to hyperpronate the wrist, and worsens on supination at least on the L hand   Historically he has no response to some medications for essential tremor (ET)  PLAN     · He would like to hold off having the next round of botulinum toxin injections in Aug/Sept 2020 in preparation for hunting season when he requires his full strength  · Return as scheduled in Sept 2020    A total of 25 minutes were spent face-to-face with this patient    HISTORY OF PRESENT ILLNESS:     Mr Devin Marino is a 82 y o  right handed male who returns for positional tremors, likely dystonic hand tremor  Last visit 2/26/2020, but botulinum toxin sessions were on 3/18/20 and 6/19/20    The patient was accompanied today  History was obtained from patient and spouse    Interim History  Please review procedure notes in interim  To summarize, he felt no subjective nor objective difference with tremor after first set of injections and dose was increased on the second set  Today, he feels the last session was partially successful  He still has the tremor but estimates it is decreased by 25-50% now, specifically the distal shaking when he presses his thumb and forefinger together  However he felt weakness in his L hand  such that he has trouble gripping his bow or his other hand while pistol shooting  No resting tremor component  He requests that the next set of injections be skipped in time for hunting season as he would prefer to have the hand strength during that time before further adjustments are made  To review:   Primidone - higher amounts worsen his tremors, no tangible benefit at any level  Gabapentin - trouble with tolerance beyond 200mg a day   Topiramate - never tried due to sulfa allergy  Propranolol - he had been on it previously to unknown dose and unknown length of time  INITIAL HISTORY  Main bothersome neurological symptoms today are:   1  Bilateral hand tremor  Per Chart review, he has been evaluated by Neurology at [facility] before with Dr Emmett Mooney and Dr Joshua Bravo   He started having tremors in his mid 45s in the R hand  He noticed it while he was trying to write - he would be able to touch his R hand with his L and then the tremors were better  He has difficulty with holding a cup when he  the cup - keeping the 3rd-5th fingers straight avoids triggering tremor  When he attempts to shoot with his handgun, his hands will shake more with the added   He uses two hands to control his mouse on the computer  Working with a screwdriver is very difficult as well as fine motor movements  One Kina Radha) sister with similar type of tremor and taking primidone  He feels his tremors have overall progressed over the years, moreso in the L than the R hand  He was diagnosed as benign essential tremor but no clear benefit on primidone  Pt feels tremors worsen when he holds onto objects  Tremor interferes with his sport hunting  He was attempted on propranolol and there was discussion about focused ultrasound therapy for treatment of medication-refractory essential tremor as patient was not interested in deep brain stimulation at the time  He was not started on topiramate due to his reported history of severe sulfa allergy  He drinks decaffeinated coffee and tea  He does not feel he was on propranolol for very long and he does not know what was the maximum dose or effect he had  He denies injury to the hand or wrists  He had a "couple of head injuries" with a skull fracture from slipping in his fish pond in 2014, another when he sustained a frontal injury from hitting his head on a tailgate of truck       REVIEW OF PAST MEDICAL, SOCIAL AND FAMILY HISTORY:  This is the list of problems as per our Medical Records:    Patient Active Problem List    Diagnosis Date Noted    Neoplasm of unspecified behavior of bone, soft tissue, 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/2020    Acquired torsion dystonia 02/27/2020    Tremor, essential 07/31/2019    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    Asthma 04/25/2019    Hypertension 04/25/2019    Lateral epicondylitis 04/25/2019    Localized primary osteoarthritis of wrist 04/25/2019    Lumbosacral spondylosis without myelopathy 04/25/2019    Primary osteoarthritis of left shoulder 04/25/2019    Carpal tunnel syndrome 10/05/2017    Cubital tunnel syndrome 10/05/2017    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    Neuropathy of both feet 11/20/2015    Benign essential hypertension 06/22/2010    ED (erectile dysfunction) of organic origin 06/22/2010    Lumbar spondylosis with myelopathy 07/28/2008    Spinal stenosis of lumbar region 04/23/2008    Low back pain 04/03/2008     Allergies   Allergen Reactions    Sulfa Antibiotics Other (See Comments)     pancreatitis    Other reaction(s):  Other (Please comment)  Pancreas destroy itself      Outpatient Encounter Medications as of 7/2/2020   Medication Sig Dispense Refill    albuterol (PROVENTIL HFA,VENTOLIN HFA) 90 mcg/act inhaler       amLODIPine (NORVASC) 10 mg tablet Take 10 mg by mouth daily       Ascorbic Acid (VITAMIN C) 1000 MG tablet Take 500 mg by mouth 2 (two) times a day before lunch and dinner       ASPIRIN 81 PO Take 81 mg by mouth daily       Biotin 5 MG CAPS Take 10 mg by mouth daily       carvedilol (COREG) 6 25 mg tablet Take 6 25 mg by mouth 2 (two) times a day with meals       chlorthalidone 25 mg tablet Take 25 mg by mouth daily       Cholecalciferol (VITAMIN D3 PO) Take 4,000 tablets by mouth daily       cloNIDine (CATAPRES) 0 1 mg tablet Take 0 1 mg by mouth daily at bedtime       Coenzyme Q10 (CO Q 10) 10 MG CAPS Take 200 mg by mouth 2 (two) times a day       Flaxseed, Linseed, (FLAXSEED OIL) 1000 MG CAPS Take 1 capsule by mouth daily       FLOVENT DISKUS 250 MCG/BLIST AEPB 1 puff a day in the morning      furosemide (LASIX) 20 mg tablet Take 20 mg by mouth daily       Kelp 150 MCG TABS Take by mouth daily      levothyroxine 75 mcg tablet Take 75 mcg by mouth daily       Multiple Vitamin (MULTI VITAMIN DAILY PO) Take 1 capsule by mouth daily       Multiple Vitamins-Minerals (PRESERVISION AREDS PO) Take 1 tablet by mouth 2 (two) times a day       Omega-3 Fatty Acids (FISH OIL PO) Take 5,000 mg by mouth      oxyCODONE (ROXICODONE) 5 mg immediate release tablet as needed       primidone (MYSOLINE) 50 mg tablet Take 50 mg by mouth every 12 (twelve) hours      Red Yeast Rice 600 MG CAPS Take 600 mg by mouth Pt takes 4 per day      simvastatin (ZOCOR) 10 mg tablet Take 10 mg by mouth daily at bedtime       Vitamin E 400 units TABS Take 800 Units by mouth daily       amoxicillin (AMOXIL) 500 mg capsule       Lecithin 1200 MG CAPS Take by mouth 2 (two) times a day       No facility-administered encounter medications on file as of 7/2/2020  REVIEW OF SYSTEMS:  The patient has entered data on an intake form regarding present illness, past medical and surgical history, medications, allergies, family and social history, and a full review of 14 systems  I have reviewed this form with the patient, and all the relevant information has been included on this note  The full review of systems was negative except as stated in HPI and below  Constitutional: Negative  Negative for appetite change and fever  HENT: Negative  Negative for hearing loss, tinnitus, trouble swallowing and voice change  Eyes: Negative for photophobia and pain  CATARACT IN RIGHT EYE   Respiratory: Negative  Negative for shortness of breath  Cardiovascular: Negative  Negative for palpitations  Gastrointestinal: Negative  Negative for nausea and vomiting  Endocrine: Negative  Negative for cold intolerance and heat intolerance  Genitourinary: Negative  Negative for dysuria, frequency and urgency  Musculoskeletal: Negative  Negative for myalgias and neck pain  Skin: Negative  Negative for rash  Neurological: Positive for tremors (ARE ABOUT THE SAME)   Negative for dizziness, seizures, syncope, facial asymmetry, speech difficulty, weakness, light-headedness, numbness and headaches  Hematological: Negative  Does not bruise/bleed easily  Psychiatric/Behavioral: Negative  Negative for confusion, hallucinations and sleep disturbance  FOCUSED PHYSICAL EXAMINATION:     Vital signs:  /68 (BP Location: Right arm, Patient Position: Sitting, Cuff Size: Adult)   Temp 97 9 °F (36 6 °C)   Wt 91 6 kg (202 lb)   BMI 30 71 kg/m²      General:  Well-appearing, well nourished, pleasant patient in no acute distress  Mood appropriate  Head:  Normocephalic, atraumatic  Oropharynx and conjunctiva are clear  Speech  No hypophonia, no bradylalia  No scanning speech  Language: Comprehension intact  Neck:  Supple, strong 5/5 forward flexion and retroflexion  Extremities: Range of motion is normal       Cognitive and Mental Exam:  The patient is alert, oriented to self, location, date and situation  Cranial Nerves:  CN II:  Direct and consensual light reflexes were equally reactive to light symmetrically  No afferent pupillary defect   Visual fields are full to confrontation  CN III / IV / VI: Extraocular movements were full, with normal pursuit and saccades  CN V:   Facial sensation to light touch was intact  CN VII: Face is symmetric with normal strength  CN VIII: Hearing was not assessed  CN X:   Palate is up going bilaterally and symmetrically  CN XI:  Neck muscles are strong  CN XII: Tongue protrusion is at midline with normal movements  No dysarthria  Motor:    Tremor:  His left hand has about same level of tremor as R  Triggered by primarily in flexion of the first and second fingers (e g  Pinching them together) with resulting flexion-extension of rest of fingers  Improved on hyperpronation of wrist, worst on supination  This does not occur as strongly by gripping a cup with the thumb and index finger alone       [id number]                Time since last dose:   4/25/19 7/31/19 2/26/20   Speech 0  0  0   Facial Expression  0 0 0   Postural Tremor (Right) 2  2 2   Postural Tremor (Left) 3 With wrist flex-ext, flex-ext of the 3rd, 4th fingers  3 able to be triggered 3 affected fingers as before   Kinetic Tremor (Right)  2  2 2   Kinetic Tremor (Left)  3 3 3   Rest tremor amplitude RUE 0 0 0 zero rest tremor   Rest tremor amplitude LUE 0 0 0   Rest tremor amplitude RLE 0 0 0   Rest tremor amplitude LLE 0 0 0   Lip/Jaw Tremor  0 0 0   Consistency of tremor 0 0 0   Finger Taps (Right)   0 - -   Finger Taps (Left)  0 - -   Hand Movement (Right)  0 - -   Hand Movement (Left)   0 - -   Pronation/Supination (Right)  0 - -   Pronation/Supination (Left)   0 - -   Toe Tapping (Right) 0 - -   Toe Tapping (Left) 0 - -   Leg Agility (Right)  0 - -   Leg Agility (Left)   0  - -   Rigidity - Neck  1  - -   Rigidity - Upper Extremity (Right)  1   - -   Rigidity - Upper Extremity (Left)   0  - -   Rigidity - Lower Extremity (Right)  0 - -   Rigidity - Lower Extremity (Left)   0 - -   Arising from Chair   0  0 0    Gait   0  0 0   Freezing of Gait 0  0 0   Postural Stability  -  - 0   Posture 0  0 0   Global spontaneity of movement 0  0 0     -------------------------------------------------------------------------------------    Muscle Strength Right Left  Muscle Strength Right Left   Deltoid 5/5 5/5  Hip Adductors 5/5 5/5   Biceps 5/5 5/5  Hip Abductors 5/5 5/5   Triceps 5/5 5/5  Knee Extensors 5/5 5/5   Wrist Extensors 5/5 5/5  Knee Flexors 5/5 5/5   Wrist Flexors 5/5 5/5  Ankle Extensors 5/5 5/5    5/5 5/5  Ankle Flexors 5/5 5/5   Finger Abductors 5/5 5/5       Hip Flexors 5/5 5/5   Hip Extensors 5/5 5/5     Coordination:  Finger-to-nose-finger: normal except for mild bilateral intention tremor  Gait:  Normal gait, stance and turns        Reflexes:    Right Left   Biceps 1/4 1/4   Brachioradialis 1/4 1/4   Triceps 1/4 1/4   Knee 1/4 1/4   Ankle 1/4 1/4

## 2020-07-02 NOTE — Clinical Note
Jey Malik,  I present Mr Noemi Castleman whom I've followed for likely dystonic tremor of the hands  He does have fam hx of tremors, and previously tried primidone, propranolol, and gabapentin without benefit  His left hand is worse than R hand (dominant), and triggers on pinching thumb and forefinger together mainly  I've been trying botox on him and it seems to calm it down, although might need to reduce FDS and FDP as it resulted in more hand weakness  He's already scheduled for you in Sept and would like to skip any botox during that time while hunting season is going on  I think you'll find him a very pleasant fellow  Please let me know if you have any questions, thanks!

## 2020-07-03 NOTE — PATIENT INSTRUCTIONS
· He would like to hold off having the next round of botulinum toxin injections in Aug/Sept 2020 in preparation for hunting season when he requires his full strength     · Return as scheduled in Sept 2020

## 2020-07-06 DIAGNOSIS — G25.0 TREMOR, ESSENTIAL: Primary | ICD-10-CM

## 2020-07-06 RX ORDER — PRIMIDONE 50 MG/1
TABLET ORAL
Qty: 180 TABLET | Refills: 2 | Status: SHIPPED | OUTPATIENT
Start: 2020-07-06 | End: 2021-02-10 | Stop reason: SDUPTHER

## 2020-07-06 NOTE — TELEPHONE ENCOUNTER
Pt called and states that he saw Dr Macho Mccall last week and talked about restarting on primidone d/t ongoing right hand tremors  States that he tried multiple meds but cannot tolerate it  Pt states that he restarted primidone 50 mg 1 tab bid and seems to be helping  Requesting rx be sent to [address] [place] [address] Rd  Rx entered  If agreeable, pls sign off  Requesting a call back once sent                    [phone] ok to leave detailed message

## 2020-07-09 DIAGNOSIS — B34.9 VIRAL DISEASE: ICD-10-CM

## 2020-07-09 PROCEDURE — U0003 INFECTIOUS AGENT DETECTION BY NUCLEIC ACID (DNA OR RNA); SEVERE ACUTE RESPIRATORY SYNDROME CORONAVIRUS 2 (SARS-COV-2) (CORONAVIRUS DISEASE [COVID-19]), AMPLIFIED PROBE TECHNIQUE, MAKING USE OF HIGH THROUGHPUT TECHNOLOGIES AS DESCRIBED BY CMS-2020-01-R: HCPCS

## 2020-07-14 ENCOUNTER — ANESTHESIA EVENT (OUTPATIENT)
Dept: PERIOP | Facility: HOSPITAL | Age: 85
End: 2020-07-14
Payer: COMMERCIAL

## 2020-07-14 NOTE — ANESTHESIA PREPROCEDURE EVALUATION
Review of Systems/Medical History  Patient summary reviewed        Cardiovascular  Hyperlipidemia, Hypertension on > 1 medication,   Comment: Stress test 2019    Probably normal study      2  No ischemia or infarct      3  Normal LV size, wall motion, and wall thickening  Normal systolic     function, EF 72%  ,  Pulmonary  Asthma , well controlled/ stable ,        GI/Hepatic  Negative GI/hepatic ROS          Chronic kidney disease ,        Endo/Other  History of thyroid disease , hypothyroidism,      GYN       Hematology  Negative hematology ROS      Musculoskeletal  Back pain , lumbar pain and spinal stenosis,   Arthritis     Neurology    CVA ,   Comment: Tremors,  dystonia Psychology           Physical Exam    Airway    Mallampati score: II  TM Distance: >3 FB  Neck ROM: full     Dental   No notable dental hx     Cardiovascular  Cardiovascular exam normal    Pulmonary  Pulmonary exam normal     Other Findings        Anesthesia Plan  ASA Score- 3     Anesthesia Type- IV sedation with anesthesia with ASA Monitors  Additional Monitors:   Airway Plan:         Plan Factors-    Induction-     Postoperative Plan-     Informed Consent- Anesthetic plan and risks discussed with patient  I personally reviewed this patient with the CRNA  Discussed and agreed on the Anesthesia Plan with the CRNA  Aniyah Greene

## 2020-07-15 ENCOUNTER — ANESTHESIA (OUTPATIENT)
Dept: PERIOP | Facility: HOSPITAL | Age: 85
End: 2020-07-15
Payer: COMMERCIAL

## 2020-07-15 ENCOUNTER — HOSPITAL ENCOUNTER (OUTPATIENT)
Facility: HOSPITAL | Age: 85
Setting detail: OUTPATIENT SURGERY
Discharge: HOME | End: 2020-07-15
Attending: PLASTIC SURGERY | Admitting: PLASTIC SURGERY
Payer: COMMERCIAL

## 2020-07-15 VITALS
DIASTOLIC BLOOD PRESSURE: 62 MMHG | RESPIRATION RATE: 16 BRPM | WEIGHT: 202 LBS | BODY MASS INDEX: 30.62 KG/M2 | HEART RATE: 62 BPM | SYSTOLIC BLOOD PRESSURE: 134 MMHG | OXYGEN SATURATION: 96 % | HEIGHT: 68 IN | TEMPERATURE: 97.3 F

## 2020-07-15 DIAGNOSIS — B34.9 VIRAL DISEASE: Primary | ICD-10-CM

## 2020-07-15 LAB — SARS-COV-2 RNA RESP QL NAA+PROBE: NEGATIVE

## 2020-07-15 PROCEDURE — 87635 SARS-COV-2 COVID-19 AMP PRB: CPT | Performed by: PLASTIC SURGERY

## 2020-07-15 RX ORDER — NEOMYCIN SULFATE, POLYMYXIN B SULFATE AND BACITRACIN ZINC 3.5; 10000; 4 MG/G; [USP'U]/G; [USP'U]/G
OINTMENT OPHTHALMIC AS NEEDED
Status: DISCONTINUED | OUTPATIENT
Start: 2020-07-15 | End: 2020-07-15 | Stop reason: HOSPADM

## 2020-07-15 RX ORDER — ONDANSETRON 4 MG/1
4 TABLET, ORALLY DISINTEGRATING ORAL EVERY 6 HOURS PRN
Status: DISCONTINUED | OUTPATIENT
Start: 2020-07-15 | End: 2020-07-15 | Stop reason: HOSPADM

## 2020-07-15 RX ORDER — CEFAZOLIN SODIUM 2 G/50ML
SOLUTION INTRAVENOUS AS NEEDED
Status: DISCONTINUED | OUTPATIENT
Start: 2020-07-15 | End: 2020-07-15 | Stop reason: SURG

## 2020-07-15 RX ORDER — FENTANYL CITRATE/PF 50 MCG/ML
25 SYRINGE (ML) INJECTION
Status: DISCONTINUED | OUTPATIENT
Start: 2020-07-15 | End: 2020-07-15 | Stop reason: HOSPADM

## 2020-07-15 RX ORDER — EPHEDRINE SULFATE 50 MG/ML
INJECTION INTRAVENOUS AS NEEDED
Status: DISCONTINUED | OUTPATIENT
Start: 2020-07-15 | End: 2020-07-15 | Stop reason: SURG

## 2020-07-15 RX ORDER — LIDOCAINE HYDROCHLORIDE AND EPINEPHRINE 5; 5 MG/ML; UG/ML
INJECTION, SOLUTION INFILTRATION; PERINEURAL AS NEEDED
Status: DISCONTINUED | OUTPATIENT
Start: 2020-07-15 | End: 2020-07-15 | Stop reason: HOSPADM

## 2020-07-15 RX ORDER — HYDROCODONE BITARTRATE AND ACETAMINOPHEN 5; 325 MG/1; MG/1
1 TABLET ORAL EVERY 6 HOURS PRN
Status: DISCONTINUED | OUTPATIENT
Start: 2020-07-15 | End: 2020-07-15 | Stop reason: HOSPADM

## 2020-07-15 RX ORDER — MAGNESIUM HYDROXIDE 1200 MG/15ML
LIQUID ORAL AS NEEDED
Status: DISCONTINUED | OUTPATIENT
Start: 2020-07-15 | End: 2020-07-15 | Stop reason: HOSPADM

## 2020-07-15 RX ORDER — DIPHENHYDRAMINE HYDROCHLORIDE 50 MG/ML
12.5 INJECTION INTRAMUSCULAR; INTRAVENOUS ONCE AS NEEDED
Status: DISCONTINUED | OUTPATIENT
Start: 2020-07-15 | End: 2020-07-15 | Stop reason: HOSPADM

## 2020-07-15 RX ORDER — PROPOFOL 10 MG/ML
INJECTION, EMULSION INTRAVENOUS CONTINUOUS PRN
Status: DISCONTINUED | OUTPATIENT
Start: 2020-07-15 | End: 2020-07-15 | Stop reason: SURG

## 2020-07-15 RX ORDER — SODIUM CHLORIDE, SODIUM LACTATE, POTASSIUM CHLORIDE, CALCIUM CHLORIDE 600; 310; 30; 20 MG/100ML; MG/100ML; MG/100ML; MG/100ML
50 INJECTION, SOLUTION INTRAVENOUS CONTINUOUS
Status: DISCONTINUED | OUTPATIENT
Start: 2020-07-15 | End: 2020-07-15 | Stop reason: HOSPADM

## 2020-07-15 RX ORDER — HYDROMORPHONE HCL/PF 1 MG/ML
0.5 SYRINGE (ML) INJECTION
Status: DISCONTINUED | OUTPATIENT
Start: 2020-07-15 | End: 2020-07-15 | Stop reason: HOSPADM

## 2020-07-15 RX ORDER — PROPOFOL 10 MG/ML
INJECTION, EMULSION INTRAVENOUS AS NEEDED
Status: DISCONTINUED | OUTPATIENT
Start: 2020-07-15 | End: 2020-07-15 | Stop reason: SURG

## 2020-07-15 RX ADMIN — SODIUM CHLORIDE, SODIUM LACTATE, POTASSIUM CHLORIDE, AND CALCIUM CHLORIDE: .6; .31; .03; .02 INJECTION, SOLUTION INTRAVENOUS at 10:45

## 2020-07-15 RX ADMIN — CEFAZOLIN SODIUM 2000 MG: 2 SOLUTION INTRAVENOUS at 10:33

## 2020-07-15 RX ADMIN — PROPOFOL 120 MCG/KG/MIN: 10 INJECTION, EMULSION INTRAVENOUS at 10:47

## 2020-07-15 RX ADMIN — PROPOFOL 80 MG: 10 INJECTION, EMULSION INTRAVENOUS at 10:47

## 2020-07-15 RX ADMIN — EPHEDRINE SULFATE 10 MG: 50 INJECTION, SOLUTION INTRAVENOUS at 11:08

## 2020-07-15 NOTE — OP NOTE
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 reconstructed with a right superior based nasolabial interpolation flap  At this time the pedicle the flap was divided from the right cheek with good back bleeding from the nasal side  The flap was inset without difficulty or the nasal tip  Operative procedure: The patient taken to the operating placed supine the operating table  He was prepped and draped in the usual fashion  Anesthesia was general supplemented xylocaine 1% with epinephrine  Pedicle of the flap was divided at the left cheek  It was circumscribed elliptical fashion with the major axis continuing as the right nasolabial crease  Hemostasis achieved the bipolar this donor site was closed then with 4 interrupted simple sutures  Next the flap itself was undermined to thin it and then cut to fit and inset over the right nasal tip with 4 interrupted simple sutures  Light dressings were applied to all areas  The the patient tolerated procedure well taken to recovery in good condition      SIGNATURE: Nasima Olivo MD  DATE: July 15, 2020  TIME: 1:59 PM

## 2020-07-19 LAB — SARS-COV-2 RNA SPEC QL NAA+PROBE: NOT DETECTED

## 2020-09-09 ENCOUNTER — TELEPHONE (OUTPATIENT)
Dept: NEUROLOGY | Facility: CLINIC | Age: 85
End: 2020-09-09

## 2020-09-09 NOTE — TELEPHONE ENCOUNTER
Attempted to contact the patient to see if he would like to proceed with Botox injections  Phone number on file just rang and rang with no option to leave a voicemail  Patient has a SEBASTIEN appointment with Dr Thomas Cast on 9/14/2020  Can discuss at upcoming appointment  If patient wishes to proceed can schedule a BINJ appointment during follow-up appointment  Once patient is scheduled please let me know and I will call to have his medication delivered

## 2020-09-09 NOTE — TELEPHONE ENCOUNTER
Missael Cunningham received a call from [facility] with [facility] to schedule delivery for the patient's Botox injections  Patient last received Botox with Dr Pat Lord on 6/19/2020  Can you reach out to the patient and see if he wants to continue his injections- If so we can schedule with Dr Ranulfo Alvarez  Once patient is scheduled please let me know and I will call to schedule delivery      Thank you    Dayne Figueroa

## 2020-09-09 NOTE — TELEPHONE ENCOUNTER
Called and spoke with the patient- he states he does not want to proceed with Botox as it was not beneficial to him  I advised him I would call the specialty pharmacy tomorrow and cancel his prescription  Patient verbally understood

## 2020-09-10 NOTE — TELEPHONE ENCOUNTER
Called [facility]- spoke with Monica Arnold- I advised her that I was calling to cancel the patient's Botox prescription as he does not wish to proceed with Botox  Prescription canceled

## 2020-09-14 ENCOUNTER — OFFICE VISIT (OUTPATIENT)
Dept: NEUROLOGY | Facility: CLINIC | Age: 85
End: 2020-09-14
Payer: COMMERCIAL

## 2020-09-14 VITALS
HEART RATE: 70 BPM | BODY MASS INDEX: 30.77 KG/M2 | SYSTOLIC BLOOD PRESSURE: 132 MMHG | TEMPERATURE: 98.3 F | WEIGHT: 203 LBS | HEIGHT: 68 IN | DIASTOLIC BLOOD PRESSURE: 67 MMHG

## 2020-09-14 DIAGNOSIS — G25.0 TREMOR, ESSENTIAL: Primary | ICD-10-CM

## 2020-09-14 DIAGNOSIS — G24.9 DYSTONIA: ICD-10-CM

## 2020-09-14 PROCEDURE — 99214 OFFICE O/P EST MOD 30 MIN: CPT | Performed by: PSYCHIATRY & NEUROLOGY

## 2020-09-14 NOTE — ASSESSMENT & PLAN NOTE
82year-old man presents in follow up for bl hand tremor  I agree he appears to have a dystonic component to his tremor, especially on the left, though I did not notice any obvious posturing  His tremor was jerky and irregular in that hand  He is currently feels that his tremors sufficiently well controlled with primidone  He had a significant reduction in his tremor with Botox however he had intolerable weakness  Now, 3 months out his last injection, has persistent weakness in his hands including finger flexors and extensors  This is unusual as Dr Wing Clayton did not list any extensor muscles in his last procedure note  Either the patient has an alternative etiology for his finger extensor weakness or this is a result of the Botox injections  I would like to see the patient back to ensure that his weakness improves  If not she undergo workup for etiology including an EMG of the left upper extremity  Could consider MRI of the cervical spine as well  A large component of the patient's tremor appear to be flexion extension movements at the MCP joints  While at rest demonstrated intermittent extension of the 2nd digit which could be targeted with Botox  Prior injections did not involve his lumbricals which could target the MCP movements  Would aim for a reduced dose over all  His first round of injections (which was not effective) was for 40 units so 60 units would be reasonable if he wants to retry botox in the future

## 2021-02-10 ENCOUNTER — OFFICE VISIT (OUTPATIENT)
Dept: NEUROLOGY | Facility: CLINIC | Age: 86
End: 2021-02-10
Payer: COMMERCIAL

## 2021-02-10 VITALS
BODY MASS INDEX: 30.55 KG/M2 | SYSTOLIC BLOOD PRESSURE: 122 MMHG | HEIGHT: 68 IN | DIASTOLIC BLOOD PRESSURE: 76 MMHG | WEIGHT: 201.6 LBS | HEART RATE: 61 BPM

## 2021-02-10 DIAGNOSIS — G25.0 TREMOR, ESSENTIAL: ICD-10-CM

## 2021-02-10 PROCEDURE — 99215 OFFICE O/P EST HI 40 MIN: CPT | Performed by: PHYSICIAN ASSISTANT

## 2021-02-10 RX ORDER — PRIMIDONE 50 MG/1
TABLET ORAL
Qty: 240 TABLET | Refills: 2 | Status: ON HOLD | OUTPATIENT
Start: 2021-02-10 | End: 2021-07-17 | Stop reason: SDUPTHER

## 2021-02-10 NOTE — ASSESSMENT & PLAN NOTE
Patient with bilateral hand tremors  He appears to have a dystonic component to his tremor, especially on the left, however there was no obvious posturing of the hand  His tremor is somewhat jerky and irregular  He has had some mild improvement of tremors with low-dose primidone  States in the past higher doses seem to make his tremors worse  He unfortunately has failed alternative medications including propanolol, Topamax and Neurontin  He had significant improvement of tremors with Botox injections however this also cause significant weakness  Because of this he is not interested in having any further Botox injections in the future  At this time it does appear that his prior hand weakness has completely resolved  He states this took about 4-5 months following his last Botox injection  We did discuss alternative medication options for tremors which unfortunately are limited  We could trial Remeron which has shown some benefit in small studies  He would not be interested in any surgical options for the tremors  For now he would prefer to remain on primidone and we trial increasing the dose  He will slowly do this on his own to see if there is any added benefit without side effects    If there is no improvement we may consider a trial of Remeron

## 2021-02-10 NOTE — PROGRESS NOTES
Patient ID: Sujatha Milton is a 82 y o  male  Assessment/Plan:    Tremor, essential  Patient with bilateral hand tremors  He appears to have a dystonic component to his tremor, especially on the left, however there was no obvious posturing of the hand  His tremor is somewhat jerky and irregular  He has had some mild improvement of tremors with low-dose primidone  States in the past higher doses seem to make his tremors worse  He unfortunately has failed alternative medications including propanolol, Topamax and Neurontin  He had significant improvement of tremors with Botox injections however this also cause significant weakness  Because of this he is not interested in having any further Botox injections in the future  At this time it does appear that his prior hand weakness has completely resolved  He states this took about 4-5 months following his last Botox injection  We did discuss alternative medication options for tremors which unfortunately are limited  We could trial Remeron which has shown some benefit in small studies  He would not be interested in any surgical options for the tremors  For now he would prefer to remain on primidone and we trial increasing the dose  He will slowly do this on his own to see if there is any added benefit without side effects  If there is no improvement we may consider a trial of Remeron             Subjective:    Annette White is an 82 y o  right handed man with bl carpal tunnel, left rotator cuff tear who presents for follow up for bilateral hand tremor  To review, onset of tremor was in his mid 45s  He was followed and treated for essential tremor at [facility] for many years and then for essential tremor and dystonia by Dr Domi Gallardo  He does have a history of sensory trick and his tremor is posture specific, jerky and irregular - at least on the left  The patient's father had tremor as well   He started with Botox injections in the past with significant  improvement of tremors however it also caused significant weakness (1st round of injections were 40 units, 2nd round was 90 units)     The patient is an avid sole       At his last visit he felt that his tremors were sufficiently controlled with primidone  He was still having weakness of his hand and he was told to follow up to assure that this was getting better  INTERVAL HISTORY:  He continues to have tremors in the hands, more on the left  His left hand weakness is now resolved  He states that this felt better around Oct or Nov  This took him close to 4-5 months to improve after his injections  His tremors are present only when he applies pressure with the hands  He will shake when gripping a plate or utensils  He struggles to do anything with the hands  He feels that the tremor is worse when he tries to  something hard, if he is lightly gripping such as holding a sponge the tremor is not as noticeable  He does feel that he can control the tremors a bit when thinking about the tremors  He has a hard time with writing  Prior medication trials:  Primidone - higher amounts worsen his tremors, lower amounts help up to 50%  Gabapentin - trouble with tolerance beyond 200mg a day   Topiramate - never tried due to sulfa allergy  Propranolol - he had been on it previously to unknown dose and unknown length of time       Current medications and timing:   Primidone 100 mg BID (Keeps "things at bay")             I personally reviewed and updated the ROS  Total time spent today was 45 minutes  Greater than 50% of total time was spent with the patient and / or family counseling and / or coordinating plan of care  Objective:    Blood pressure 122/76, pulse 61, height 5' 8" (1 727 m), weight 91 4 kg (201 lb 9 6 oz)  Physical Exam  Constitutional:       General: He is awake     HENT:      Right Ear: Hearing normal       Left Ear: Hearing normal    Eyes:      General: Lids are normal       Extraocular Movements: Extraocular movements intact  Pupils: Pupils are equal, round, and reactive to light  Psychiatric:         Speech: Speech normal          Neurological Exam  Mental Status  Awake  Oriented to person, place and time  Speech is normal     Cranial Nerves  CN III, IV, VI: Extraocular movements intact bilaterally  Normal lids and orbits bilaterally  Pupils equal round and reactive to light bilaterally  CN V:  Right: Facial sensation is normal   Left: Facial sensation is normal on the left  CN VIII:  Right: Hearing is normal   Left: Hearing is normal   CN XI: Shoulder shrug strength is normal   Patient wearing face mask   Motor    5/5 upper extremity strength including hand  and intrinsics bilaterally      Patient is able to bring out tremor by pushing thumb and index finger together bilaterally  This is slightly worse on the left  No resting tremor noted  Mild postural tremor on the right overall normal on the left  Slight action tremor with finger-to-nose testing bilaterally  When walking left hand tremor emerges very irregular and dystonia appearing   No clear dystonic posturing of either hand rest     Sensory  Light touch is normal in upper and lower extremities  Coordination  Right: Finger-to-nose abnormality:  Left: Finger-to-nose abnormality:    Gait  Casual gait is normal including stance, stride, and arm swing  ROS:    Review of Systems   Constitutional: Negative  Negative for appetite change and fever  HENT: Negative  Negative for hearing loss, tinnitus, trouble swallowing and voice change  Eyes: Negative  Negative for photophobia and pain  Respiratory: Negative  Negative for shortness of breath  Cardiovascular: Negative  Negative for palpitations  Gastrointestinal: Negative  Negative for nausea and vomiting  Endocrine: Negative  Negative for cold intolerance  Genitourinary: Negative  Negative for dysuria, frequency and urgency  Musculoskeletal: Negative  Negative for myalgias and neck pain  Skin: Negative  Negative for rash  Allergic/Immunologic: Negative  Neurological: Positive for tremors (Hands)  Negative for dizziness, seizures, syncope, facial asymmetry, speech difficulty, weakness, light-headedness, numbness and headaches  Hematological: Negative  Does not bruise/bleed easily  Psychiatric/Behavioral: Negative  Negative for confusion, hallucinations and sleep disturbance  All other systems reviewed and are negative

## 2021-07-02 ENCOUNTER — HOSPITAL ENCOUNTER (OUTPATIENT)
Dept: NON INVASIVE DIAGNOSTICS | Facility: CLINIC | Age: 86
Discharge: HOME | End: 2021-07-02
Payer: COMMERCIAL

## 2021-07-02 ENCOUNTER — OFFICE VISIT (OUTPATIENT)
Dept: CARDIOLOGY CLINIC | Facility: CLINIC | Age: 86
End: 2021-07-02
Payer: COMMERCIAL

## 2021-07-02 VITALS
WEIGHT: 186.4 LBS | DIASTOLIC BLOOD PRESSURE: 50 MMHG | HEART RATE: 93 BPM | BODY MASS INDEX: 28.25 KG/M2 | SYSTOLIC BLOOD PRESSURE: 106 MMHG | OXYGEN SATURATION: 94 % | HEIGHT: 68 IN

## 2021-07-02 DIAGNOSIS — E78.5 DYSLIPIDEMIA: ICD-10-CM

## 2021-07-02 DIAGNOSIS — R60.0 BILATERAL LOWER EXTREMITY EDEMA: Primary | ICD-10-CM

## 2021-07-02 DIAGNOSIS — R60.0 BILATERAL LOWER EXTREMITY EDEMA: ICD-10-CM

## 2021-07-02 DIAGNOSIS — I10 BENIGN ESSENTIAL HYPERTENSION: ICD-10-CM

## 2021-07-02 DIAGNOSIS — I49.1 PREMATURE ATRIAL COMPLEXES: ICD-10-CM

## 2021-07-02 PROCEDURE — 93000 ELECTROCARDIOGRAM COMPLETE: CPT | Performed by: INTERNAL MEDICINE

## 2021-07-02 PROCEDURE — 99204 OFFICE O/P NEW MOD 45 MIN: CPT | Performed by: INTERNAL MEDICINE

## 2021-07-02 PROCEDURE — 93970 EXTREMITY STUDY: CPT

## 2021-07-02 RX ORDER — MORPHINE SULFATE 30 MG/1
TABLET, FILM COATED, EXTENDED RELEASE ORAL
COMMUNITY
Start: 2021-06-09

## 2021-07-02 RX ORDER — PREDNISONE 20 MG/1
TABLET ORAL
Status: ON HOLD | COMMUNITY
Start: 2021-05-28 | End: 2021-07-17 | Stop reason: CLARIF

## 2021-07-02 RX ORDER — MORPHINE SULFATE 60 MG/1
TABLET, FILM COATED, EXTENDED RELEASE ORAL
Status: ON HOLD | COMMUNITY
Start: 2021-06-30 | End: 2021-07-17 | Stop reason: CLARIF

## 2021-07-02 RX ORDER — PROCHLORPERAZINE MALEATE 10 MG
10 TABLET ORAL EVERY 6 HOURS PRN
COMMUNITY
Start: 2021-06-30

## 2021-07-02 NOTE — PROGRESS NOTES
Cardiology Office Note  MD Jorge A Fox MD Angie Hy, DO, [address] [facility] MD Madeleine Moon DO, Marilynn Cuevas DO, [facility] [place]  ----------------------------------------------------------------  [address], [address] 82 y o  male MRN: [id number]  Unit/Bed#:  Encounter: [id number]      History of Present Illness: It was a pleasure to see Minh Delacruz in the office today for initial CV evaluation  He has a past medical history hypertension, dyslipidemia, chronic venous insufficiency, mild carotid artery stenosis and stage IV non-small-cell lung cancer  He established care with us in July 2021  The patient is followed by Hematology/Oncology with plan for Port-A-Cath placement and initiation of palliative chemotherapy  He is known to Dr Rissa Banks from Oncology  While being examined by primary care, it was noted the patient had significant lower extremity swelling  ECG was performed demonstrating irregular heart rhythm and was initially read as atrial fibrillation  He was sent to Cardiology Office for evaluation of his potential atrial fibrillation  In the last couple days, he was increased on his diuretic medication from Lasix 20 mg daily to Lasix 60 mg daily  He only took an additional tablet yesterday for a total 40 mg, but has not increased to 60 at home  He denies any chest pain, pressure, tightness or squeezing  Denies orthopnea or paroxysmal nocturnal dyspnea  He does have some wheezing but does related to malignancy pushing against his trachea  This is known and has been going on for several weeks now  He does report a personal history of DVT in the past     Review of Systems:  Review of Systems   Constitutional: Negative for decreased appetite, fever, weight gain and weight loss  HENT: Negative for congestion and sore throat  Eyes: Negative for visual disturbance     Cardiovascular: Positive for leg swelling  Negative for chest pain, dyspnea on exertion, near-syncope and palpitations  Respiratory: Negative for cough and shortness of breath  Hematologic/Lymphatic: Negative for bleeding problem  Skin: Negative for rash  Musculoskeletal: Negative for myalgias and neck pain  Gastrointestinal: Negative for abdominal pain and nausea  Neurological: Negative for light-headedness and weakness  Psychiatric/Behavioral: Negative for depression         Past Medical History:   Diagnosis Date    Arthritis     Asthma     Chronic pain disorder     back    Colon polyp     Disease of thyroid gland     hypo    Dystonic tremor     Essential tremor     Hyperlipidemia     Hypertension     Pneumonia     x2    Stroke ([facility] [address] )     unknown and no residual       Past Surgical History:   Procedure Laterality Date    BACK SURGERY      CATARACT EXTRACTION Left     COLONOSCOPY      JOINT REPLACEMENT Left     reverse shoulder    MOHS RECONSTRUCTION N/A 6/3/2020    Procedure: REPAIR MOHS OF THE NOSE;  Surgeon: Luke Martin MD;  Location: [address] [place] [address];  Service: Plastics    UT DELAY/SECTN FLAP LID,NOS,EAR,LIP N/A 7/15/2020    Procedure: DIVIDE NASAL FLAP;  Surgeon: Luke Martin MD;  Location: [address] [place] [address];  Service: Plastics    ROTATOR CUFF REPAIR      SPINE SURGERY      TONSILLECTOMY         Social History     Socioeconomic History    Marital status: Unknown     Spouse name: None    Number of children: None    Years of education: None    Highest education level: None   Occupational History    None   Tobacco Use    Smoking status: Former Smoker     Types: Cigarettes     Quit date: 1994     Years since quittin 2    Smokeless tobacco: Current User     Types: Chew    Tobacco comment: currently uses chewing tobacco   Vaping Use    Vaping Use: Never used   Substance and Sexual Activity    Alcohol use: Yes     Comment: < 1 drink a month    Drug use: Never    Sexual activity: Not Currently Other Topics Concern    None   Social History Narrative    DOES NOT CONSUME CAFFEINE     Social Determinants of Health     Financial Resource Strain:     Difficulty of Paying Living Expenses:    Food Insecurity:     Worried About Running Out of Food in the Last Year:     Ran Out of Food in the Last Year:    Transportation Needs:     Lack of Transportation (Medical):  Lack of Transportation (Non-Medical):    Physical Activity:     Days of Exercise per Week:     Minutes of Exercise per Session:    Stress:     Feeling of Stress :    Social Connections:     Frequency of Communication with Friends and Family:     Frequency of Social Gatherings with Friends and Family:     Attends [employer] Services:     Active Member of Clubs or Organizations:     Attends Club or Organization Meetings:     Marital Status:    Intimate Partner Violence:     Fear of Current or Ex-Partner:     Emotionally Abused:     Physically Abused:     Sexually Abused:        Family History   Problem Relation Age of Onset    No Known Problems Mother     No Known Problems Father        Allergies   Allergen Reactions    Lisinopril      Swelling of tongue    Sulfa Antibiotics Other (See Comments)     pancreatitis    Other reaction(s):  Other (Please comment)  Pancreas destroy itself    Zestoretic [Lisinopril-Hydrochlorothiazide]      Pancreatitis         Current Outpatient Medications:     albuterol (PROVENTIL HFA,VENTOLIN HFA) 90 mcg/act inhaler, , Disp: , Rfl:     amLODIPine (NORVASC) 10 mg tablet, Take 10 mg by mouth daily , Disp: , Rfl:     ASPIRIN 81 PO, Take 81 mg by mouth daily , Disp: , Rfl:     carvedilol (COREG) 6 25 mg tablet, Take 6 25 mg by mouth 2 (two) times a day with meals , Disp: , Rfl:     FLOVENT DISKUS 250 MCG/BLIST AEPB, 1 puff a day in the morning, Disp: , Rfl:     furosemide (LASIX) 20 mg tablet, Take 20 mg by mouth daily , Disp: , Rfl:     levothyroxine 75 mcg tablet, Take 75 mcg by mouth daily , Disp: , Rfl:     morphine (MS CONTIN) 30 mg 12 hr tablet, TAKE 1 TABLET (30 MG TOTAL) BY MOUTH 2 (TWO) TIMES A DAY   MAX DAILY AMOUNT  60 MG, Disp: , Rfl:     morphine (MS CONTIN) 60 mg 12 hr tablet, , Disp: , Rfl:     oxyCODONE (ROXICODONE) 5 mg immediate release tablet, as needed , Disp: , Rfl:     predniSONE 20 mg tablet, , Disp: , Rfl:     primidone (MYSOLINE) 50 mg tablet, Take 1tab bid, may slowly increase to 2tabs bid, Disp: 240 tablet, Rfl: 2    prochlorperazine (COMPAZINE) 10 mg tablet, , Disp: , Rfl:     simvastatin (ZOCOR) 10 mg tablet, Take 10 mg by mouth daily at bedtime , Disp: , Rfl:     amoxicillin (AMOXIL) 500 mg capsule, , Disp: , Rfl:     Ascorbic Acid (VITAMIN C) 1000 MG tablet, Take 500 mg by mouth daily  (Patient not taking: Reported on 7/2/2021), Disp: , Rfl:     Biotin 5 MG CAPS, Take 10 mg by mouth daily  (Patient not taking: Reported on 7/2/2021), Disp: , Rfl:     chlorthalidone 25 mg tablet, Take 25 mg by mouth daily  (Patient not taking: Reported on 7/2/2021), Disp: , Rfl:     Cholecalciferol (VITAMIN D3 PO), Take 4,000 tablets by mouth daily  (Patient not taking: Reported on 7/2/2021), Disp: , Rfl:     cloNIDine (CATAPRES) 0 1 mg tablet, Take 0 1 mg by mouth daily at bedtime  (Patient not taking: Reported on 7/2/2021), Disp: , Rfl:     Coenzyme Q10 (CO Q 10) 10 MG CAPS, Take 200 mg by mouth daily  (Patient not taking: Reported on 7/2/2021), Disp: , Rfl:     Flaxseed, Linseed, (FLAXSEED OIL) 1000 MG CAPS, Take 1 capsule by mouth daily  (Patient not taking: Reported on 7/2/2021), Disp: , Rfl:     Kelp 150 MCG TABS, Take by mouth daily (Patient not taking: Reported on 7/2/2021), Disp: , Rfl:     Lecithin 1200 MG CAPS, Take by mouth daily  (Patient not taking: Reported on 7/2/2021), Disp: , Rfl:     Multiple Vitamin (MULTI VITAMIN DAILY PO), Take 1 capsule by mouth daily  (Patient not taking: Reported on 7/2/2021), Disp: , Rfl:     Multiple Vitamins-Minerals (PRESERVISION AREDS PO), Take 1 tablet by mouth 2 (two) times a day  (Patient not taking: Reported on 7/2/2021), Disp: , Rfl:     Omega-3 Fatty Acids (FISH OIL PO), Take 4,800 mg by mouth  (Patient not taking: Reported on 7/2/2021), Disp: , Rfl:     Red Yeast Rice 600 MG CAPS, Take 600 mg by mouth Pt takes 4 per day (Patient not taking: Reported on 7/2/2021), Disp: , Rfl:     Vitamin E 400 units TABS, Take 800 Units by mouth daily  (Patient not taking: Reported on 7/2/2021), Disp: , Rfl:     Vitals:    07/02/21 1109   BP: 106/50   BP Location: Right arm   Patient Position: Sitting   Cuff Size: Large   Pulse: 93   SpO2: 94%   Weight: 84 6 kg (186 lb 6 4 oz)   Height: 5' 8" (1 727 m)       PHYSICAL EXAMINATION:  Gen: Awake, Alert, NAD   Head/eyes: AT/NC, pupils equal and round, Anicteric  ENT: mmm  Neck: Supple, No elevated JVP, trachea midline; upper airway wheezing  Resp: CTA bilaterally no w/r/r   CV: RRR +S1, S2, No m/r/g  Abd: Soft, NT/ND + BS  Ext: +3 RLE and +2 LLE edema   Neuro: Follows commands, moves all extermities  Psych: Appropriate affect, normal mood, pleasant attitude, non-combative  Skin: warm; no rash, erythema or venous stasis changes on exposed skin    --------------------------------------------------------------------------------  TREADMILL STRESS  No results found for this or any previous visit      --------------------------------------------------------------------------------  NUCLEAR STRESS TEST: No results found for this or any previous visit  No results found for this or any previous visit       --------------------------------------------------------------------------------  CATH:  No results found for this or any previous visit     --------------------------------------------------------------------------------  ECHO:   No results found for this or any previous visit      No results found for this or any previous visit     --------------------------------------------------------------------------------  HOLTER  No results found for this or any previous visit  No results found for this or any previous visit     --------------------------------------------------------------------------------  CAROTIDS  No results found for this or any previous visit      --------------------------------------------------------------------------------  ECGs:  Results for orders placed or performed in visit on 07/02/21   POCT ECG    Impression    Sinus rhythm APCs 88 bpm, left axis deviation, RBBB        No results found for: WBC, HGB, HCT, MCV, PLT   No results found for: SODIUM, K, CL, CO2, BUN, CREATININE, GLUC, CALCIUM   Lab Results   Component Value Date    HGBA1C 5 7 (H) 03/05/2021      No results found for: CHOL  No results found for: HDL  No results found for: LDLCALC  No results found for: TRIG  No results found for: CHOLHDL   No results found for: INR, PROTIME     1  Premature atrial complexes  -     POCT ECG    2  Bilateral lower extremity edema    3  Dyslipidemia    4  Benign essential hypertension        IMPRESSION:  · Lower extremity swelling  · Non-small cell lung cancer, stage IV plan for palliative chemotherapy  · Hypertension  · Dyslipidemia  · Hypothyroid  · Venous insufficiency  · History of mild carotid stenosis bilaterally, 2019  · Pharmacologic nuclear stress test negative for myocardial ischemia, gated EF 72%, August 2019    PLAN:  It was a pleasure to see Frank Mullins in the office today for initial CV evaluation  His ECG did not demonstrate atrial fibrillation, but rather shows sinus rhythm with premature atrial complexes  He has bilateral lower extremity swelling left greater than right  He has no orthopnea or significant dyspnea on exertion  He does have wheezing but is likely related to his malignancy pushing against his trachea which has been well documented for the patient  He has no symptoms concerning for angina  Blood pressure and heart rate are stable in the office today  Based on his clinical presentation, I have the following recommendations:    1  Recommend checking stat lower extremity venous Doppler to assess for any evidence of DVT given the patient's known history of DVT and prior malignancy  2  I would increase his Lasix to 60 mg daily for the next 3 days including today and then back up to Lasix 40 mg daily  I would like to have the patient reach out and call the office on Tuesday and see how he is doing with regarding to his swelling  Low threshold to further increased diuretic if necessary  3  Should he gain greater than 3 lb in a day or 5 lb overall, I have recommended that he call the office for further titration of his diuretic medication  4  Continue current antihypertensive regimen including carvedilol  Discontinue chlorthalidone while he is taking Lasix  5  Check 2D echocardiogram to assess cardiac structure and function  Check NT proBNP  6  Should his symptoms worsen in frequency or severity or change in quality, I have recommended that he seek immediate medical attention especially if he becomes severely short of breath  7  We will follow up with him after testing  As always, please do not hesitate to call with any questions  Portions of the record may have been created with voice recognition software  Occasional wrong word or "sound a like" substitutions may have occurred due to the inherent limitations of voice recognition software  Read the chart carefully and recognize, using context, where substitutions have occurred        Signed: Pineda Dominguez, Bro CHO

## 2021-07-04 PROCEDURE — 93970 EXTREMITY STUDY: CPT | Performed by: SURGERY

## 2021-07-14 ENCOUNTER — APPOINTMENT (EMERGENCY)
Dept: CT IMAGING | Facility: HOSPITAL | Age: 86
DRG: 180 | End: 2021-07-14
Payer: COMMERCIAL

## 2021-07-14 ENCOUNTER — HOSPITAL ENCOUNTER (INPATIENT)
Facility: HOSPITAL | Age: 86
LOS: 3 days | Discharge: HOME | DRG: 180 | End: 2021-07-17
Attending: EMERGENCY MEDICINE | Admitting: INTERNAL MEDICINE
Payer: COMMERCIAL

## 2021-07-14 ENCOUNTER — TELEPHONE (OUTPATIENT)
Dept: CARDIOLOGY CLINIC | Facility: CLINIC | Age: 86
End: 2021-07-14

## 2021-07-14 DIAGNOSIS — R49.0 HOARSE: ICD-10-CM

## 2021-07-14 DIAGNOSIS — R06.02 SHORTNESS OF BREATH: Primary | ICD-10-CM

## 2021-07-14 DIAGNOSIS — G25.0 TREMOR, ESSENTIAL: ICD-10-CM

## 2021-07-14 DIAGNOSIS — N17.9 AKI (ACUTE KIDNEY INJURY) (HCC): ICD-10-CM

## 2021-07-14 DIAGNOSIS — E87.6 HYPOKALEMIA: ICD-10-CM

## 2021-07-14 DIAGNOSIS — C34.91 SQUAMOUS CELL CARCINOMA OF RIGHT LUNG (HCC): ICD-10-CM

## 2021-07-14 DIAGNOSIS — R49.0 HOARSE VOICE QUALITY: ICD-10-CM

## 2021-07-14 PROBLEM — R13.19 OTHER DYSPHAGIA: Status: ACTIVE | Noted: 2021-07-14

## 2021-07-14 PROBLEM — C34.11 MALIGNANT NEOPLASM OF UPPER LOBE OF RIGHT LUNG (HCC): Chronic | Status: ACTIVE | Noted: 2021-07-14

## 2021-07-14 PROBLEM — G89.3 CANCER RELATED PAIN: Chronic | Status: ACTIVE | Noted: 2021-07-14

## 2021-07-14 LAB
ANION GAP SERPL CALCULATED.3IONS-SCNC: 4 MMOL/L (ref 4–13)
APTT PPP: 34 SECONDS (ref 23–37)
BASOPHILS # BLD AUTO: 0.06 THOUSANDS/ΜL (ref 0–0.1)
BASOPHILS NFR BLD AUTO: 1 % (ref 0–1)
BUN SERPL-MCNC: 70 MG/DL (ref 5–25)
CALCIUM SERPL-MCNC: 8.6 MG/DL (ref 8.3–10.1)
CHLORIDE SERPL-SCNC: 93 MMOL/L (ref 100–108)
CO2 SERPL-SCNC: 40 MMOL/L (ref 21–32)
CREAT SERPL-MCNC: 1.79 MG/DL (ref 0.6–1.3)
EOSINOPHIL # BLD AUTO: 0.69 THOUSAND/ΜL (ref 0–0.61)
EOSINOPHIL NFR BLD AUTO: 8 % (ref 0–6)
ERYTHROCYTE [DISTWIDTH] IN BLOOD BY AUTOMATED COUNT: 12.8 % (ref 11.6–15.1)
GFR SERPL CREATININE-BSD FRML MDRD: 34 ML/MIN/1.73SQ M
GLUCOSE SERPL-MCNC: 128 MG/DL (ref 65–140)
HCT VFR BLD AUTO: 39.2 % (ref 36.5–49.3)
HGB BLD-MCNC: 13 G/DL (ref 12–17)
IMM GRANULOCYTES # BLD AUTO: 0.05 THOUSAND/UL (ref 0–0.2)
IMM GRANULOCYTES NFR BLD AUTO: 1 % (ref 0–2)
INR PPP: 1.36 (ref 0.84–1.19)
LYMPHOCYTES # BLD AUTO: 0.47 THOUSANDS/ΜL (ref 0.6–4.47)
LYMPHOCYTES NFR BLD AUTO: 5 % (ref 14–44)
MCH RBC QN AUTO: 30.2 PG (ref 26.8–34.3)
MCHC RBC AUTO-ENTMCNC: 33.2 G/DL (ref 31.4–37.4)
MCV RBC AUTO: 91 FL (ref 82–98)
MONOCYTES # BLD AUTO: 0.99 THOUSAND/ΜL (ref 0.17–1.22)
MONOCYTES NFR BLD AUTO: 11 % (ref 4–12)
NEUTROPHILS # BLD AUTO: 6.9 THOUSANDS/ΜL (ref 1.85–7.62)
NEUTS SEG NFR BLD AUTO: 74 % (ref 43–75)
NRBC BLD AUTO-RTO: 0 /100 WBCS
NT-PROBNP SERPL-MCNC: 1605 PG/ML
PLATELET # BLD AUTO: 244 THOUSANDS/UL (ref 149–390)
PMV BLD AUTO: 11.7 FL (ref 8.9–12.7)
POTASSIUM SERPL-SCNC: 2.8 MMOL/L (ref 3.5–5.3)
PROTHROMBIN TIME: 16.5 SECONDS (ref 11.6–14.5)
RBC # BLD AUTO: 4.31 MILLION/UL (ref 3.88–5.62)
SODIUM SERPL-SCNC: 137 MMOL/L (ref 136–145)
TROPONIN I SERPL-MCNC: <0.02 NG/ML
WBC # BLD AUTO: 9.16 THOUSAND/UL (ref 4.31–10.16)

## 2021-07-14 PROCEDURE — 36415 COLL VENOUS BLD VENIPUNCTURE: CPT | Performed by: EMERGENCY MEDICINE

## 2021-07-14 PROCEDURE — 99223 1ST HOSP IP/OBS HIGH 75: CPT | Performed by: NURSE PRACTITIONER

## 2021-07-14 PROCEDURE — 99285 EMERGENCY DEPT VISIT HI MDM: CPT

## 2021-07-14 PROCEDURE — 99285 EMERGENCY DEPT VISIT HI MDM: CPT | Performed by: EMERGENCY MEDICINE

## 2021-07-14 PROCEDURE — 85730 THROMBOPLASTIN TIME PARTIAL: CPT | Performed by: EMERGENCY MEDICINE

## 2021-07-14 PROCEDURE — 70491 CT SOFT TISSUE NECK W/DYE: CPT

## 2021-07-14 PROCEDURE — 96365 THER/PROPH/DIAG IV INF INIT: CPT

## 2021-07-14 PROCEDURE — 71260 CT THORAX DX C+: CPT

## 2021-07-14 PROCEDURE — 96361 HYDRATE IV INFUSION ADD-ON: CPT

## 2021-07-14 PROCEDURE — 96375 TX/PRO/DX INJ NEW DRUG ADDON: CPT

## 2021-07-14 PROCEDURE — 93005 ELECTROCARDIOGRAM TRACING: CPT

## 2021-07-14 PROCEDURE — 85610 PROTHROMBIN TIME: CPT | Performed by: EMERGENCY MEDICINE

## 2021-07-14 PROCEDURE — 84484 ASSAY OF TROPONIN QUANT: CPT | Performed by: EMERGENCY MEDICINE

## 2021-07-14 PROCEDURE — 85025 COMPLETE CBC W/AUTO DIFF WBC: CPT | Performed by: EMERGENCY MEDICINE

## 2021-07-14 PROCEDURE — 83880 ASSAY OF NATRIURETIC PEPTIDE: CPT | Performed by: EMERGENCY MEDICINE

## 2021-07-14 PROCEDURE — 80048 BASIC METABOLIC PNL TOTAL CA: CPT | Performed by: EMERGENCY MEDICINE

## 2021-07-14 RX ORDER — DEXAMETHASONE SODIUM PHOSPHATE 4 MG/ML
6 INJECTION, SOLUTION INTRA-ARTICULAR; INTRALESIONAL; INTRAMUSCULAR; INTRAVENOUS; SOFT TISSUE ONCE
Status: COMPLETED | OUTPATIENT
Start: 2021-07-14 | End: 2021-07-14

## 2021-07-14 RX ORDER — POTASSIUM CHLORIDE 14.9 MG/ML
20 INJECTION INTRAVENOUS ONCE
Status: COMPLETED | OUTPATIENT
Start: 2021-07-14 | End: 2021-07-15

## 2021-07-14 RX ORDER — POTASSIUM CHLORIDE 14.9 MG/ML
20 INJECTION INTRAVENOUS ONCE
Status: COMPLETED | OUTPATIENT
Start: 2021-07-14 | End: 2021-07-14

## 2021-07-14 RX ADMIN — MORPHINE SULFATE 6 MG: 2 INJECTION, SOLUTION INTRAMUSCULAR; INTRAVENOUS at 21:44

## 2021-07-14 RX ADMIN — IOHEXOL 100 ML: 350 INJECTION, SOLUTION INTRAVENOUS at 21:31

## 2021-07-14 RX ADMIN — SODIUM CHLORIDE 500 ML: 0.9 INJECTION, SOLUTION INTRAVENOUS at 20:31

## 2021-07-14 RX ADMIN — DEXAMETHASONE SODIUM PHOSPHATE 6 MG: 4 INJECTION, SOLUTION INTRAMUSCULAR; INTRAVENOUS at 20:26

## 2021-07-14 RX ADMIN — POTASSIUM CHLORIDE 20 MEQ: 14.9 INJECTION, SOLUTION INTRAVENOUS at 21:52

## 2021-07-14 NOTE — TELEPHONE ENCOUNTER
Spoke with pts wife, states swelling is still " pretty bad"  Pt today is 172 lbs  She states he was 184 lbs at last visit  Pt is currently taking furosemide 40 mg BID  Please advise

## 2021-07-14 NOTE — TELEPHONE ENCOUNTER
Patient is now significantly short of breath and remains markedly edematous  Diuretic has not been helping  At this point, I am concerned that he needs intravenous diuretic medication  I have sent him to the emergency department and [facility] for evaluation  Patient is being taken by family

## 2021-07-14 NOTE — TELEPHONE ENCOUNTER
----- Message from Darlyn Espinoza DO sent at 7/14/2021  3:15 PM EDT -----  Please reach out to the patient and see how he is doing with regard to his increased dose of diuretic and swelling  If he is still significantly swollen without much improvement, we will need to further increase his diuretic to 40 mg twice a day  Thank you

## 2021-07-15 PROBLEM — E43 SEVERE PROTEIN-CALORIE MALNUTRITION (HCC): Status: ACTIVE | Noted: 2021-07-15

## 2021-07-15 PROBLEM — R06.89 ACUTE RESPIRATORY INSUFFICIENCY: Status: ACTIVE | Noted: 2021-07-14

## 2021-07-15 LAB
ANION GAP SERPL CALCULATED.3IONS-SCNC: 8 MMOL/L (ref 4–13)
ATRIAL RATE: 159 BPM
BASOPHILS # BLD AUTO: 0.01 THOUSANDS/ΜL (ref 0–0.1)
BASOPHILS NFR BLD AUTO: 0 % (ref 0–1)
BUN SERPL-MCNC: 63 MG/DL (ref 5–25)
CALCIUM SERPL-MCNC: 8.7 MG/DL (ref 8.3–10.1)
CHLORIDE SERPL-SCNC: 99 MMOL/L (ref 100–108)
CO2 SERPL-SCNC: 32 MMOL/L (ref 21–32)
CREAT SERPL-MCNC: 1.56 MG/DL (ref 0.6–1.3)
EOSINOPHIL # BLD AUTO: 0.02 THOUSAND/ΜL (ref 0–0.61)
EOSINOPHIL NFR BLD AUTO: 0 % (ref 0–6)
ERYTHROCYTE [DISTWIDTH] IN BLOOD BY AUTOMATED COUNT: 12.8 % (ref 11.6–15.1)
GFR SERPL CREATININE-BSD FRML MDRD: 40 ML/MIN/1.73SQ M
GLUCOSE SERPL-MCNC: 153 MG/DL (ref 65–140)
HCT VFR BLD AUTO: 36.5 % (ref 36.5–49.3)
HGB BLD-MCNC: 12.3 G/DL (ref 12–17)
IMM GRANULOCYTES # BLD AUTO: 0.04 THOUSAND/UL (ref 0–0.2)
IMM GRANULOCYTES NFR BLD AUTO: 1 % (ref 0–2)
LYMPHOCYTES # BLD AUTO: 0.24 THOUSANDS/ΜL (ref 0.6–4.47)
LYMPHOCYTES NFR BLD AUTO: 3 % (ref 14–44)
MCH RBC QN AUTO: 30.4 PG (ref 26.8–34.3)
MCHC RBC AUTO-ENTMCNC: 33.7 G/DL (ref 31.4–37.4)
MCV RBC AUTO: 90 FL (ref 82–98)
MONOCYTES # BLD AUTO: 0.24 THOUSAND/ΜL (ref 0.17–1.22)
MONOCYTES NFR BLD AUTO: 3 % (ref 4–12)
NEUTROPHILS # BLD AUTO: 6.51 THOUSANDS/ΜL (ref 1.85–7.62)
NEUTS SEG NFR BLD AUTO: 93 % (ref 43–75)
NRBC BLD AUTO-RTO: 0 /100 WBCS
P AXIS: 92 DEGREES
PLATELET # BLD AUTO: 209 THOUSANDS/UL (ref 149–390)
PMV BLD AUTO: 10.8 FL (ref 8.9–12.7)
POTASSIUM SERPL-SCNC: 3.5 MMOL/L (ref 3.5–5.3)
PR INTERVAL: 198 MS
PROCALCITONIN SERPL-MCNC: <0.05 NG/ML
QRS AXIS: -74 DEGREES
QRSD INTERVAL: 136 MS
QT INTERVAL: 360 MS
QTC INTERVAL: 491 MS
RBC # BLD AUTO: 4.05 MILLION/UL (ref 3.88–5.62)
SODIUM SERPL-SCNC: 139 MMOL/L (ref 136–145)
T WAVE AXIS: 77 DEGREES
VENTRICULAR RATE: 112 BPM
WBC # BLD AUTO: 7.06 THOUSAND/UL (ref 4.31–10.16)

## 2021-07-15 PROCEDURE — 94640 AIRWAY INHALATION TREATMENT: CPT

## 2021-07-15 PROCEDURE — 99232 SBSQ HOSP IP/OBS MODERATE 35: CPT | Performed by: INTERNAL MEDICINE

## 2021-07-15 PROCEDURE — 92610 EVALUATE SWALLOWING FUNCTION: CPT

## 2021-07-15 PROCEDURE — 94668 MNPJ CHEST WALL SBSQ: CPT

## 2021-07-15 PROCEDURE — 84145 PROCALCITONIN (PCT): CPT | Performed by: STUDENT IN AN ORGANIZED HEALTH CARE EDUCATION/TRAINING PROGRAM

## 2021-07-15 PROCEDURE — 80048 BASIC METABOLIC PNL TOTAL CA: CPT | Performed by: STUDENT IN AN ORGANIZED HEALTH CARE EDUCATION/TRAINING PROGRAM

## 2021-07-15 PROCEDURE — 93010 ELECTROCARDIOGRAM REPORT: CPT | Performed by: INTERNAL MEDICINE

## 2021-07-15 PROCEDURE — 87205 SMEAR GRAM STAIN: CPT | Performed by: NURSE PRACTITIONER

## 2021-07-15 PROCEDURE — 87070 CULTURE OTHR SPECIMN AEROBIC: CPT | Performed by: NURSE PRACTITIONER

## 2021-07-15 PROCEDURE — 99222 1ST HOSP IP/OBS MODERATE 55: CPT | Performed by: INTERNAL MEDICINE

## 2021-07-15 PROCEDURE — 85025 COMPLETE CBC W/AUTO DIFF WBC: CPT | Performed by: STUDENT IN AN ORGANIZED HEALTH CARE EDUCATION/TRAINING PROGRAM

## 2021-07-15 RX ORDER — POTASSIUM CHLORIDE 20MEQ/15ML
40 LIQUID (ML) ORAL ONCE
Status: COMPLETED | OUTPATIENT
Start: 2021-07-15 | End: 2021-07-15

## 2021-07-15 RX ORDER — OXYCODONE HYDROCHLORIDE 5 MG/1
5 TABLET ORAL EVERY 4 HOURS PRN
Status: DISCONTINUED | OUTPATIENT
Start: 2021-07-15 | End: 2021-07-15

## 2021-07-15 RX ORDER — CLONIDINE HYDROCHLORIDE 0.1 MG/1
0.1 TABLET ORAL
Status: DISCONTINUED | OUTPATIENT
Start: 2021-07-15 | End: 2021-07-17 | Stop reason: HOSPADM

## 2021-07-15 RX ORDER — OXYCODONE HYDROCHLORIDE 10 MG/1
20 TABLET ORAL EVERY 6 HOURS PRN
Status: DISCONTINUED | OUTPATIENT
Start: 2021-07-15 | End: 2021-07-17 | Stop reason: HOSPADM

## 2021-07-15 RX ORDER — HYDROMORPHONE HCL IN WATER/PF 6 MG/30 ML
0.2 PATIENT CONTROLLED ANALGESIA SYRINGE INTRAVENOUS ONCE
Status: COMPLETED | OUTPATIENT
Start: 2021-07-15 | End: 2021-07-15

## 2021-07-15 RX ORDER — HYDROMORPHONE HCL IN WATER/PF 6 MG/30 ML
0.2 PATIENT CONTROLLED ANALGESIA SYRINGE INTRAVENOUS EVERY 4 HOURS PRN
Status: DISCONTINUED | OUTPATIENT
Start: 2021-07-15 | End: 2021-07-17 | Stop reason: HOSPADM

## 2021-07-15 RX ORDER — BUDESONIDE 0.5 MG/2ML
0.5 INHALANT ORAL
Status: DISCONTINUED | OUTPATIENT
Start: 2021-07-15 | End: 2021-07-17 | Stop reason: HOSPADM

## 2021-07-15 RX ORDER — LEVOTHYROXINE SODIUM 0.07 MG/1
75 TABLET ORAL
Status: DISCONTINUED | OUTPATIENT
Start: 2021-07-15 | End: 2021-07-17 | Stop reason: HOSPADM

## 2021-07-15 RX ORDER — PREDNISONE 20 MG/1
40 TABLET ORAL DAILY
Status: DISCONTINUED | OUTPATIENT
Start: 2021-07-16 | End: 2021-07-17 | Stop reason: HOSPADM

## 2021-07-15 RX ORDER — FLUTICASONE PROPIONATE 220 UG/1
2 AEROSOL, METERED RESPIRATORY (INHALATION) EVERY 12 HOURS SCHEDULED
Status: DISCONTINUED | OUTPATIENT
Start: 2021-07-15 | End: 2021-07-15

## 2021-07-15 RX ORDER — AMLODIPINE BESYLATE 10 MG/1
10 TABLET ORAL DAILY
Status: DISCONTINUED | OUTPATIENT
Start: 2021-07-15 | End: 2021-07-17 | Stop reason: HOSPADM

## 2021-07-15 RX ORDER — SIMETHICONE 80 MG
80 TABLET,CHEWABLE ORAL 4 TIMES DAILY PRN
Status: DISCONTINUED | OUTPATIENT
Start: 2021-07-15 | End: 2021-07-17 | Stop reason: HOSPADM

## 2021-07-15 RX ORDER — IPRATROPIUM BROMIDE AND ALBUTEROL SULFATE 2.5; .5 MG/3ML; MG/3ML
3 SOLUTION RESPIRATORY (INHALATION)
Status: DISCONTINUED | OUTPATIENT
Start: 2021-07-15 | End: 2021-07-17 | Stop reason: HOSPADM

## 2021-07-15 RX ORDER — FUROSEMIDE 10 MG/ML
60 INJECTION INTRAMUSCULAR; INTRAVENOUS ONCE
Status: COMPLETED | OUTPATIENT
Start: 2021-07-15 | End: 2021-07-15

## 2021-07-15 RX ORDER — POLYETHYLENE GLYCOL 3350 17 G/17G
17 POWDER, FOR SOLUTION ORAL DAILY
Status: DISCONTINUED | OUTPATIENT
Start: 2021-07-15 | End: 2021-07-17 | Stop reason: HOSPADM

## 2021-07-15 RX ORDER — IPRATROPIUM BROMIDE AND ALBUTEROL SULFATE 2.5; .5 MG/3ML; MG/3ML
3 SOLUTION RESPIRATORY (INHALATION)
Status: DISCONTINUED | OUTPATIENT
Start: 2021-07-15 | End: 2021-07-15

## 2021-07-15 RX ORDER — OXYCODONE HYDROCHLORIDE 10 MG/1
20 TABLET ORAL 2 TIMES DAILY
Status: DISCONTINUED | OUTPATIENT
Start: 2021-07-15 | End: 2021-07-17 | Stop reason: HOSPADM

## 2021-07-15 RX ORDER — MORPHINE SULFATE 30 MG/1
30 TABLET, FILM COATED, EXTENDED RELEASE ORAL EVERY 12 HOURS SCHEDULED
Status: DISCONTINUED | OUTPATIENT
Start: 2021-07-15 | End: 2021-07-17 | Stop reason: HOSPADM

## 2021-07-15 RX ORDER — IPRATROPIUM BROMIDE AND ALBUTEROL SULFATE 2.5; .5 MG/3ML; MG/3ML
3 SOLUTION RESPIRATORY (INHALATION) EVERY 6 HOURS PRN
Status: DISCONTINUED | OUTPATIENT
Start: 2021-07-15 | End: 2021-07-17 | Stop reason: HOSPADM

## 2021-07-15 RX ORDER — SENNA AND DOCUSATE SODIUM 50; 8.6 MG/1; MG/1
1 TABLET, FILM COATED ORAL
COMMUNITY

## 2021-07-15 RX ORDER — GUAIFENESIN 100 MG/5ML
200 SOLUTION ORAL EVERY 4 HOURS PRN
Status: DISCONTINUED | OUTPATIENT
Start: 2021-07-15 | End: 2021-07-17 | Stop reason: HOSPADM

## 2021-07-15 RX ORDER — ACETAMINOPHEN 325 MG/1
650 TABLET ORAL EVERY 6 HOURS PRN
Status: DISCONTINUED | OUTPATIENT
Start: 2021-07-15 | End: 2021-07-17 | Stop reason: HOSPADM

## 2021-07-15 RX ORDER — ASPIRIN 81 MG/1
81 TABLET, CHEWABLE ORAL DAILY
Status: DISCONTINUED | OUTPATIENT
Start: 2021-07-15 | End: 2021-07-17 | Stop reason: HOSPADM

## 2021-07-15 RX ORDER — PRIMIDONE 50 MG/1
50 TABLET ORAL EVERY 12 HOURS SCHEDULED
Status: DISCONTINUED | OUTPATIENT
Start: 2021-07-15 | End: 2021-07-17 | Stop reason: HOSPADM

## 2021-07-15 RX ORDER — OXYCODONE HYDROCHLORIDE 5 MG/1
5 TABLET ORAL ONCE
Status: DISCONTINUED | OUTPATIENT
Start: 2021-07-15 | End: 2021-07-15

## 2021-07-15 RX ORDER — CARVEDILOL 6.25 MG/1
6.25 TABLET ORAL 2 TIMES DAILY WITH MEALS
Status: DISCONTINUED | OUTPATIENT
Start: 2021-07-15 | End: 2021-07-17 | Stop reason: HOSPADM

## 2021-07-15 RX ORDER — MAGNESIUM HYDROXIDE/ALUMINUM HYDROXICE/SIMETHICONE 120; 1200; 1200 MG/30ML; MG/30ML; MG/30ML
30 SUSPENSION ORAL EVERY 6 HOURS PRN
Status: DISCONTINUED | OUTPATIENT
Start: 2021-07-15 | End: 2021-07-17 | Stop reason: HOSPADM

## 2021-07-15 RX ORDER — POLYETHYLENE GLYCOL 3350 17 G/17G
17 POWDER, FOR SOLUTION ORAL DAILY
COMMUNITY

## 2021-07-15 RX ORDER — AZITHROMYCIN 250 MG/1
500 TABLET, FILM COATED ORAL EVERY 24 HOURS
Status: DISCONTINUED | OUTPATIENT
Start: 2021-07-15 | End: 2021-07-17 | Stop reason: HOSPADM

## 2021-07-15 RX ORDER — ONDANSETRON 2 MG/ML
4 INJECTION INTRAMUSCULAR; INTRAVENOUS EVERY 6 HOURS PRN
Status: DISCONTINUED | OUTPATIENT
Start: 2021-07-15 | End: 2021-07-17 | Stop reason: HOSPADM

## 2021-07-15 RX ORDER — ALBUTEROL SULFATE 90 UG/1
2 AEROSOL, METERED RESPIRATORY (INHALATION) EVERY 4 HOURS PRN
Status: DISCONTINUED | OUTPATIENT
Start: 2021-07-15 | End: 2021-07-17 | Stop reason: HOSPADM

## 2021-07-15 RX ADMIN — FUROSEMIDE 60 MG: 10 INJECTION, SOLUTION INTRAVENOUS at 02:58

## 2021-07-15 RX ADMIN — ENOXAPARIN SODIUM 30 MG: 30 INJECTION, SOLUTION INTRAVENOUS; SUBCUTANEOUS at 08:07

## 2021-07-15 RX ADMIN — FUROSEMIDE 60 MG: 10 INJECTION, SOLUTION INTRAVENOUS at 11:15

## 2021-07-15 RX ADMIN — OXYCODONE HYDROCHLORIDE 20 MG: 10 TABLET ORAL at 18:34

## 2021-07-15 RX ADMIN — AMLODIPINE BESYLATE 10 MG: 10 TABLET ORAL at 08:06

## 2021-07-15 RX ADMIN — AZITHROMYCIN MONOHYDRATE 500 MG: 250 TABLET ORAL at 15:25

## 2021-07-15 RX ADMIN — IPRATROPIUM BROMIDE AND ALBUTEROL SULFATE 3 ML: 2.5; .5 SOLUTION RESPIRATORY (INHALATION) at 02:30

## 2021-07-15 RX ADMIN — POTASSIUM CHLORIDE 40 MEQ: 20 SOLUTION ORAL at 01:30

## 2021-07-15 RX ADMIN — CLONIDINE HYDROCHLORIDE 0.1 MG: 0.1 TABLET ORAL at 21:50

## 2021-07-15 RX ADMIN — PRIMIDONE 50 MG: 50 TABLET ORAL at 01:32

## 2021-07-15 RX ADMIN — IPRATROPIUM BROMIDE AND ALBUTEROL SULFATE 3 ML: 2.5; .5 SOLUTION RESPIRATORY (INHALATION) at 20:20

## 2021-07-15 RX ADMIN — MORPHINE SULFATE 30 MG: 30 TABLET, EXTENDED RELEASE ORAL at 01:30

## 2021-07-15 RX ADMIN — CARVEDILOL 6.25 MG: 6.25 TABLET, FILM COATED ORAL at 08:07

## 2021-07-15 RX ADMIN — IPRATROPIUM BROMIDE AND ALBUTEROL SULFATE 3 ML: 2.5; .5 SOLUTION RESPIRATORY (INHALATION) at 12:50

## 2021-07-15 RX ADMIN — MORPHINE SULFATE 30 MG: 30 TABLET, EXTENDED RELEASE ORAL at 21:49

## 2021-07-15 RX ADMIN — OXYCODONE HYDROCHLORIDE 20 MG: 10 TABLET ORAL at 21:50

## 2021-07-15 RX ADMIN — IPRATROPIUM BROMIDE AND ALBUTEROL SULFATE 3 ML: 2.5; .5 SOLUTION RESPIRATORY (INHALATION) at 07:43

## 2021-07-15 RX ADMIN — MORPHINE SULFATE 30 MG: 30 TABLET, EXTENDED RELEASE ORAL at 08:07

## 2021-07-15 RX ADMIN — FLUTICASONE PROPIONATE 2 PUFF: 220 AEROSOL, METERED RESPIRATORY (INHALATION) at 01:31

## 2021-07-15 RX ADMIN — GUAIFENESIN 200 MG: 100 SOLUTION ORAL at 21:51

## 2021-07-15 RX ADMIN — PRIMIDONE 50 MG: 50 TABLET ORAL at 08:08

## 2021-07-15 RX ADMIN — LEVOTHYROXINE SODIUM 75 MCG: 75 TABLET ORAL at 06:31

## 2021-07-15 RX ADMIN — PRIMIDONE 50 MG: 50 TABLET ORAL at 21:51

## 2021-07-15 RX ADMIN — GUAIFENESIN 200 MG: 100 SOLUTION ORAL at 15:30

## 2021-07-15 RX ADMIN — OXYCODONE HYDROCHLORIDE 20 MG: 10 TABLET ORAL at 06:31

## 2021-07-15 RX ADMIN — ASPIRIN 81 MG: 81 TABLET, CHEWABLE ORAL at 08:06

## 2021-07-15 RX ADMIN — POTASSIUM CHLORIDE 20 MEQ: 14.9 INJECTION, SOLUTION INTRAVENOUS at 00:48

## 2021-07-15 RX ADMIN — FLUTICASONE PROPIONATE 2 PUFF: 220 AEROSOL, METERED RESPIRATORY (INHALATION) at 08:08

## 2021-07-15 RX ADMIN — CARVEDILOL 6.25 MG: 6.25 TABLET, FILM COATED ORAL at 15:30

## 2021-07-15 RX ADMIN — HYDROMORPHONE HYDROCHLORIDE 0.2 MG: 0.2 INJECTION, SOLUTION INTRAMUSCULAR; INTRAVENOUS; SUBCUTANEOUS at 01:31

## 2021-07-15 RX ADMIN — BUDESONIDE 0.5 MG: 0.5 INHALANT ORAL at 20:20

## 2021-07-15 NOTE — MALNUTRITION/BMI
This medical record reflects one or more clinical indicators suggestive of malnutrition and/or morbid obesity  Malnutrition Findings:   Adult Malnutrition type: Acute illness  Adult Degree of Malnutrition: Other severe protein calorie malnutrition (Severe pro/rené malnutrition r/t cancer related issues as evidenced by 4% unplanned wt loss since 7/2/21, consuming < 50% energy intake compared to estimated energy needs > 5 days  Treated with modified diet and Enlive tid)  Malnutrition Characteristics: Inadequate energy, Weight loss            Body mass index is 27 19 kg/m²  See Nutrition note dated 7/15/21 for additional details  Completed nutrition assessment is viewable in the nutrition documentation

## 2021-07-15 NOTE — ED PROVIDER NOTES
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, left acetabulum, right sacrum, and brain currently on palliative chemotherapy and radiation, hypertension, hyperlipidemia who presents with shortness of breath and voice hoarseness  Patient has been seeing Cardiology for bilateral lower extremity edema  Patient states that his weight has been up and down  Patient is currently on 40 mg Lasix daily  Over the past 2 days, the patient has been experiencing shortness of breath and voice hoarseness  Denies any increased lower extremity edema  Denies any sick contacts  Patient states that he is also having difficulty swallowing which is new  States that he choked on a pill today  Patient called his cardiologist and was sent to the emergency department for evaluation  Denies fever/chills, nausea/vomiting, lightheadedness/dizziness, numbness/weakness, headache, change in vision, URI symptoms, neck pain, chest pain, palpitations, cough, back pain, flank pain, abdominal pain, diarrhea, hematochezia, melena, dysuria, hematuria  Prior to Admission Medications   Prescriptions Last Dose Informant Patient Reported? Taking?    ASPIRIN 81 PO  Spouse/Significant Other Yes No   Sig: Take 81 mg by mouth daily    Ascorbic Acid (VITAMIN C) 1000 MG tablet  Spouse/Significant Other Yes No   Sig: Take 500 mg by mouth daily    Patient not taking: Reported on 7/2/2021   Biotin 5 MG CAPS  Spouse/Significant Other Yes No   Sig: Take 10 mg by mouth daily    Patient not taking: Reported on 7/2/2021   Cholecalciferol (VITAMIN D3 PO)  Spouse/Significant Other Yes No   Sig: Take 4,000 tablets by mouth daily    Patient not taking: Reported on 7/2/2021   Coenzyme Q10 (CO Q 10) 10 MG CAPS Spouse/Significant Other Yes No   Sig: Take 200 mg by mouth daily    Patient not taking: Reported on 2021   FLOVENT DISKUS 250 MCG/BLIST AEPB  Spouse/Significant Other Yes No   Si puff a day in the morning   Flaxseed, Linseed, (FLAXSEED OIL) 1000 MG CAPS  Spouse/Significant Other Yes No   Sig: Take 1 capsule by mouth daily    Patient not taking: Reported on 2021   Kelp 150 MCG TABS  Spouse/Significant Other Yes No   Sig: Take by mouth daily   Patient not taking: Reported on 2021   Lecithin 1200 MG CAPS  Spouse/Significant Other Yes No   Sig: Take by mouth daily    Patient not taking: Reported on 2021   Multiple Vitamin (MULTI VITAMIN DAILY PO)  Spouse/Significant Other Yes No   Sig: Take 1 capsule by mouth daily    Patient not taking: Reported on 2021   Multiple Vitamins-Minerals (PRESERVISION AREDS PO)  Spouse/Significant Other Yes No   Sig: Take 1 tablet by mouth 2 (two) times a day    Patient not taking: Reported on 2021   Omega-3 Fatty Acids (FISH OIL PO)  Spouse/Significant Other Yes No   Sig: Take 4,800 mg by mouth    Patient not taking: Reported on 2021   Red Yeast Rice 600 MG CAPS  Spouse/Significant Other Yes No   Sig: Take 600 mg by mouth Pt takes 4 per day   Patient not taking: Reported on 2021   Vitamin E 400 units TABS  Spouse/Significant Other Yes No   Sig: Take 800 Units by mouth daily    Patient not taking: Reported on 2021   albuterol (PROVENTIL HFA,VENTOLIN HFA) 90 mcg/act inhaler  Spouse/Significant Other Yes No   amLODIPine (NORVASC) 10 mg tablet  Spouse/Significant Other Yes No   Sig: Take 10 mg by mouth daily    amoxicillin (AMOXIL) 500 mg capsule  Spouse/Significant Other Yes No   Patient not taking: Reported on 2021   carvedilol (COREG) 6 25 mg tablet  Spouse/Significant Other Yes No   Sig: Take 6 25 mg by mouth 2 (two) times a day with meals    cloNIDine (CATAPRES) 0 1 mg tablet  Spouse/Significant Other Yes No   Sig: Take 0 1 mg by mouth daily at bedtime    Patient not taking: Reported on 7/2/2021   furosemide (LASIX) 20 mg tablet  Spouse/Significant Other Yes No   Sig: Take 20 mg by mouth daily    levothyroxine 75 mcg tablet  Spouse/Significant Other Yes No   Sig: Take 75 mcg by mouth daily    morphine (MS CONTIN) 30 mg 12 hr tablet   Yes No   Sig: TAKE 1 TABLET (30 MG TOTAL) BY MOUTH 2 (TWO) TIMES A DAY   MAX DAILY AMOUNT  60 MG   morphine (MS CONTIN) 60 mg 12 hr tablet   Yes No   oxyCODONE (ROXICODONE) 5 mg immediate release tablet  Spouse/Significant Other Yes No   Sig: as needed    predniSONE 20 mg tablet   Yes No   primidone (MYSOLINE) 50 mg tablet  Spouse/Significant Other No No   Sig: Take 1tab bid, may slowly increase to 2tabs bid   prochlorperazine (COMPAZINE) 10 mg tablet   Yes No   simvastatin (ZOCOR) 10 mg tablet  Spouse/Significant Other Yes No   Sig: Take 10 mg by mouth daily at bedtime       Facility-Administered Medications: None       Past Medical History:   Diagnosis Date    Arthritis     Asthma     Chronic pain disorder     back    Colon polyp     Disease of thyroid gland     hypo    Dystonic tremor     Essential tremor     Hyperlipidemia     Hypertension     Pneumonia     x2    Stroke ([facility] [address] )     unknown and no residual       Past Surgical History:   Procedure Laterality Date    BACK SURGERY      CATARACT EXTRACTION Left     COLONOSCOPY      JOINT REPLACEMENT Left     reverse shoulder    MOHS RECONSTRUCTION N/A 6/3/2020    Procedure: REPAIR MOHS OF THE NOSE;  Surgeon: Kathleen Funez MD;  Location: [address] [place] [address];  Service: Plastics    CT DELAY/SECTN FLAP LID,NOS,EAR,LIP N/A 7/15/2020    Procedure: DIVIDE NASAL FLAP;  Surgeon: Kathleen Funez MD;  Location: [address] [place] [address];  Service: Plastics    ROTATOR CUFF REPAIR      SPINE SURGERY      TONSILLECTOMY         Family History   Problem Relation Age of Onset    No Known Problems Mother     No Known Problems Father      I have reviewed and agree with the history as documented  E-Cigarette/Vaping    E-Cigarette Use Never User      E-Cigarette/Vaping Substances    Nicotine No     THC No     CBD No     Flavoring No     Other No     Unknown No      Social History     Tobacco Use    Smoking status: Former Smoker     Types: Cigarettes     Quit date: 1994     Years since quittin 2    Smokeless tobacco: Current User     Types: Chew    Tobacco comment: currently uses chewing tobacco   Vaping Use    Vaping Use: Never used   Substance Use Topics    Alcohol use: Yes     Comment: < 1 drink a month    Drug use: Never       Review of Systems   Constitutional: Negative for chills, fatigue and fever  HENT: Positive for trouble swallowing and voice change  Negative for rhinorrhea and sore throat  Eyes: Negative for photophobia and visual disturbance  Respiratory: Positive for shortness of breath  Negative for cough and chest tightness  Cardiovascular: Negative for chest pain, palpitations and leg swelling  Gastrointestinal: Negative for abdominal pain, blood in stool, diarrhea, nausea and vomiting  Endocrine: Negative for polyuria  Genitourinary: Negative for dysuria, flank pain and hematuria  Musculoskeletal: Negative for back pain and neck pain  Skin: Negative for color change and rash  Allergic/Immunologic: Negative for immunocompromised state  Neurological: Negative for dizziness, weakness, light-headedness, numbness and headaches  All other systems reviewed and are negative  Physical Exam  Physical Exam  Constitutional:       General: He is not in acute distress  Appearance: Normal appearance  He is well-developed  HENT:      Mouth/Throat:      Lips: Pink  Mouth: Mucous membranes are dry  Pharynx: Uvula midline  Eyes:      General: Lids are normal       Conjunctiva/sclera: Conjunctivae normal       Pupils: Pupils are equal, round, and reactive to light  Neck:      Thyroid: No thyroid mass or thyromegaly  Trachea: Trachea normal       Comments: No obvious tracheal deviation  Voice is hoarse  Patient is handling his secretions  Cardiovascular:      Rate and Rhythm: Regular rhythm  Tachycardia present  Pulses: Normal pulses  Heart sounds: Normal heart sounds  No murmur heard  Pulmonary:      Effort: Pulmonary effort is normal       Breath sounds: Normal breath sounds  Chest:      Comments: Port in left upper chest is clean, dry, intact  Abdominal:      General: Bowel sounds are normal       Palpations: Abdomen is soft  Tenderness: There is no abdominal tenderness  There is no guarding or rebound  Negative signs include Medel's sign  Musculoskeletal:      Comments: 2+ pitting edema bilateral lower extremities  Skin:     General: Skin is warm and dry  Neurological:      Mental Status: He is alert  Psychiatric:         Speech: Speech normal          Behavior: Behavior normal  Behavior is cooperative  Thought Content:  Thought content normal          Vital Signs  ED Triage Vitals   Temperature Pulse Respirations Blood Pressure SpO2   07/14/21 2006 07/14/21 2004 07/14/21 2004 07/14/21 2004 07/14/21 2004   98 7 °F (37 1 °C) 100 18 152/71 95 %      Temp Source Heart Rate Source Patient Position - Orthostatic VS BP Location FiO2 (%)   07/14/21 2006 07/14/21 2004 07/14/21 2004 07/14/21 2004 --   Oral Monitor Lying Right arm       Pain Score       07/14/21 2144       Worst Possible Pain           Vitals:    07/14/21 2004 07/14/21 2200 07/14/21 2211   BP: 152/71  134/60   Pulse: 100 96 97   Patient Position - Orthostatic VS: Lying  Sitting         Visual Acuity      ED Medications  Medications   potassium chloride 20 mEq IVPB (premix) (has no administration in time range)   potassium chloride 20 mEq IVPB (premix) (20 mEq Intravenous New Bag 7/14/21 2152)   dexamethasone (DECADRON) injection 6 mg (6 mg Intravenous Given 7/14/21 2026)   sodium chloride 0 9 % bolus 500 mL (0 mL Intravenous Stopped 7/14/21 2148)   morphine injection 6 mg (6 mg Intravenous Given 7/14/21 2144)   iohexol (OMNIPAQUE) 350 MG/ML injection (SINGLE-DOSE) 100 mL (100 mL Intravenous Given 7/14/21 2131)       Diagnostic Studies  Results Reviewed     Procedure Component Value Units Date/Time    Basic metabolic panel [[id number]]  (Abnormal) Collected: 07/14/21 2028    Lab Status: Final result Specimen: Blood from Arm, Left Updated: 07/14/21 2113     Sodium 137 mmol/L      Potassium 2 8 mmol/L      Chloride 93 mmol/L      CO2 40 mmol/L      ANION GAP 4 mmol/L      BUN 70 mg/dL      Creatinine 1 79 mg/dL      Glucose 128 mg/dL      Calcium 8 6 mg/dL      eGFR 34 ml/min/1 73sq m     Narrative:      [place] guidelines for Chronic Kidney Disease (CKD):     Stage 1 with normal or high GFR (GFR > 90 mL/min/1 73 square meters)    Stage 2 Mild CKD (GFR = 60-89 mL/min/1 73 square meters)    Stage 3A Moderate CKD (GFR = 45-59 mL/min/1 73 square meters)    Stage 3B Moderate CKD (GFR = 30-44 mL/min/1 73 square meters)    Stage 4 Severe CKD (GFR = 15-29 mL/min/1 73 square meters)    Stage 5 End Stage CKD (GFR <15 mL/min/1 73 square meters)  Note: GFR calculation is accurate only with a steady state creatinine    NT-BNP PRO [[id number]]  (Abnormal) Collected: 07/14/21 2028    Lab Status: Final result Specimen: Blood from Arm, Left Updated: 07/14/21 2113     NT-proBNP 1,605 pg/mL     Troponin I [[id number]]  (Normal) Collected: 07/14/21 2028    Lab Status: Final result Specimen: Blood from Arm, Left Updated: 07/14/21 2111     Troponin I <0 02 ng/mL     Protime-INR [[id number]]  (Abnormal) Collected: 07/14/21 2028    Lab Status: Final result Specimen: Blood from Arm, Left Updated: 07/14/21 2109     Protime 16 5 seconds      INR 1 36    APTT [[id number]]  (Normal) Collected: 07/14/21 2028    Lab Status: Final result Specimen: Blood from Arm, Left Updated: 07/14/21 2109     PTT 34 seconds     CBC and differential [[id number]]  (Abnormal) Collected: 07/14/21 2028    Lab Status: Final result Specimen: Blood from Arm, Left Updated: 07/14/21 2053     WBC 9 16 Thousand/uL      RBC 4 31 Million/uL      Hemoglobin 13 0 g/dL      Hematocrit 39 2 %      MCV 91 fL      MCH 30 2 pg      MCHC 33 2 g/dL      RDW 12 8 %      MPV 11 7 fL      Platelets 789 Thousands/uL      nRBC 0 /100 WBCs      Neutrophils Relative 74 %      Immat GRANS % 1 %      Lymphocytes Relative 5 %      Monocytes Relative 11 %      Eosinophils Relative 8 %      Basophils Relative 1 %      Neutrophils Absolute 6 90 Thousands/µL      Immature Grans Absolute 0 05 Thousand/uL      Lymphocytes Absolute 0 47 Thousands/µL      Monocytes Absolute 0 99 Thousand/µL      Eosinophils Absolute 0 69 Thousand/µL      Basophils Absolute 0 06 Thousands/µL                  CT chest with contrast   Final Result by Fahad Kamara MD (07/14 2219)         1  Right upper lobe, hilar and paratracheal mass measuring 8 2 x 5 1 x 7 6 cm consistent with primary lung malignancy  Encasement of the right mainstem bronchus and proximal segmental bronchi without evidence of endobronchial mass or occlusion  2   Satellite 2 to 3 mm pulmonary nodules in the apical region of the right upper lobe  3   High right paratracheal 1 6 cm lymph node, possibly necrotic  Subcarinal 2 cm lymph node  Workstation performed: [id number]         CT soft tissue neck with contrast   Final Result by Fahad Kamara MD (07/14 2152)         1  No evidence of mass or lymphadenopathy in the neck  2   Right upper lobe and hilar mass consistent with primary lung malignancy, further evaluated on the accompanying chest CT report              Workstation performed: [id number]                    Procedures  ECG 12 Lead Documentation Only    Date/Time: 7/14/2021 8:36 PM  Performed by: Mohini Calderon MD  Authorized by: Mohini Calderon MD     ECG reviewed by me, the ED Provider: yes    Patient location:  ED  Previous ECG:     Previous ECG:  Unavailable    Comparison to cardiac monitor: Yes    Interpretation:     Interpretation: abnormal    Rate:     ECG rate:  112    ECG rate assessment: tachycardic    Rhythm:     Rhythm: sinus tachycardia    Ectopy:     Ectopy: PAC    QRS:     QRS axis:  Left    QRS intervals:  Normal  Conduction:     Conduction: abnormal      Abnormal conduction: complete RBBB    ST segments:     ST segments:  Normal  T waves:     T waves: normal               ED Course  ED Course as of Jul 14 2313 Wed Jul 14, 2021   2116 1 09 on 7/1 with [facility]  Receiving fluids  Creatinine(!): 1 79   2116 Will replete IV   Potassium(!): 2 8                                           MDM  Number of Diagnoses or Management Options  Diagnosis management comments: Concerned that the patient's cancer is encroaching on the esophagus and airway  Will check labs, CT chest and soft tissue neck  Patient will likely require admission        Disposition  Final diagnoses:   Shortness of breath   Hoarse voice quality   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)   Hypokalemia   [facility]     Time reflects when diagnosis was documented in both MDM as applicable and the Disposition within this note     Time User Action Codes Description Comment    7/14/2021 11:01 PM Rogenia Crow Add [R06 02] Shortness of breath     7/14/2021 11:02 PM Cathlean Faster P Add [R49 0] Hoarse voice quality     7/14/2021 11:02 PM Cathlean Faster P Add [C34 91] Squamous cell carcinoma of right lung ([facility] [address] )     7/14/2021 11:04 PM Cathlean Faster P Add [E87 6] Hypokalemia     7/14/2021 11:12 PM Cathlean Faster P Add [N17 9] BIJAN (acute kidney injury) [facility])       ED Disposition     ED Disposition Condition Date/Time Comment    Admit Stable Wed Jul 14, 2021 11:01 PM         Follow-up Information    None         Patient's Medications   Discharge Prescriptions    No medications on file     No discharge procedures on file     PDMP Review       Value Time User    PDMP Reviewed  Yes 4/30/2020  7:19 PM Jonathan Larson MD          ED Provider  Electronically Signed by           Ana Luisa Rehman MD  07/14/21 2142

## 2021-07-15 NOTE — CONSULTS
Cardiology Consultation  MD Rubi Chacon MD Lanna Haskell, DO, [facility]  MD Katharine Pace DO, Ga Haines DO, [facility]  ----------------------------------------------------------------  [address] [place] [address],  [address] 82 y o  male MRN: [id number]  Unit/Bed#: E5 -01 Encounter: [id number]      Reason for Consultation: Shortness of breath      ASSESSMENT:   · Shortness of breath/dyspnea on exertion  · Lower extremity swelling  · Non-small cell lung cancer, stage IV plan for palliative chemotherapy  · Hypertension  · Dyslipidemia  · Hypothyroid  · Venous insufficiency  · History of mild carotid stenosis bilaterally, 2019  · Pharmacologic nuclear stress test negative for myocardial ischemia, gated EF 72%, August 2019    PLAN:  On examination, the patient's swelling appears to be stable in comparison to prior  Creatinine did improve with IV diuresis and NT proBNP is elevated  Will give additional dose of IV diuretic for palliative treatment of dyspnea and monitor response  I predominantly believe that his shortness of breath is related to his underlying lung disease  Continue amlodipine, clonidine, carvedilol and aspirin  Check 2D echocardiogram to assess cardiac structure and function  Pulmonary evaluation pending  Patient has undergone outpatient venous study due to asymmetric swelling which was negative for DVT in July 2021  Supportive care  Guarded long-term prognosis; goals of care may need to be re-considered    Signed: Shanda Walker DO, [facility], FACP      History of Present Illness:  Dolores Bunn is a 82 y o  male with stage IV non-small cell lung cancer, mild carotid artery stenosis, hypertension, dyslipidemia and chronic venous insufficiency has been experiencing progressive shortness of breath and difficulty speaking  His shortness of breath has worsened in the face of lower extremity swelling    He is known to me in the office where he was recently placed on Lasix 40 mg p o  B i d  Despite the increased diuretic, he still continues to be more short of breath and has had persistent lower extremity swelling  As his symptoms have worsened he placed a call to the office to let us know that he is still not feeling well  As he is having difficulty breathing and even speaking, I recommended he be seen in the emergency department for evaluation  In the emergency department, NT proBNP was elevated  He denies any chest pain, pressure, tightness or squeezing  Denies lightheadedness, dizziness or palpitations  Admits to minimal orthopnea  Denies paroxysmal nocturnal dyspnea  Review of Systems:  Review of Systems   Constitutional: Negative for decreased appetite, fever, weight gain and weight loss  HENT: Negative for congestion and sore throat  Eyes: Negative for visual disturbance  Cardiovascular: Positive for dyspnea on exertion  Negative for chest pain, leg swelling, near-syncope and palpitations  Respiratory: Positive for shortness of breath  Negative for cough  Hematologic/Lymphatic: Negative for bleeding problem  Skin: Negative for rash  Musculoskeletal: Negative for myalgias and neck pain  Gastrointestinal: Negative for abdominal pain and nausea  Neurological: Negative for light-headedness and weakness  Psychiatric/Behavioral: Negative for depression           Past Medical History:   Diagnosis Date    Arthritis     Asthma     Chronic pain disorder     back    Colon polyp     Disease of thyroid gland     hypo    Dystonic tremor     Essential tremor     Hyperlipidemia     Hypertension     Pneumonia     x2    Stroke ([facility] [address] )     unknown and no residual       Past Surgical History:   Procedure Laterality Date    BACK SURGERY      CATARACT EXTRACTION Left     COLONOSCOPY      JOINT REPLACEMENT Left     reverse shoulder    MOHS RECONSTRUCTION N/A 6/3/2020    Procedure: REPAIR MOHS OF THE NOSE;  Surgeon: Isabel Taylor MD;  Location: [address] [place] [address];  Service: Plastics    WA DELAY/SECTN FLAP LID,NOS,EAR,LIP N/A 7/15/2020    Procedure: DIVIDE NASAL FLAP;  Surgeon: Isabel Taylor MD;  Location: [address] [place] [address];  Service: Plastics    ROTATOR CUFF REPAIR      SPINE SURGERY      TONSILLECTOMY         Social History     Socioeconomic History    Marital status: Unknown     Spouse name: None    Number of children: None    Years of education: None    Highest education level: None   Occupational History    None   Tobacco Use    Smoking status: Former Smoker     Types: Cigarettes     Quit date: 1994     Years since quittin 2    Smokeless tobacco: Current User     Types: Chew    Tobacco comment: currently uses chewing tobacco   Vaping Use    Vaping Use: Never used   Substance and Sexual Activity    Alcohol use: Yes     Comment: < 1 drink a month    Drug use: Never    Sexual activity: Not Currently   Other Topics Concern    None   Social History Narrative    DOES NOT CONSUME CAFFEINE     Social Determinants of Health     Financial Resource Strain:     Difficulty of Paying Living Expenses:    Food Insecurity:     Worried About Running Out of Food in the Last Year:     Ran Out of Food in the Last Year:    Transportation Needs:     Lack of Transportation (Medical):      Lack of Transportation (Non-Medical):    Physical Activity:     Days of Exercise per Week:     Minutes of Exercise per Session:    Stress:     Feeling of Stress :    Social Connections:     Frequency of Communication with Friends and Family:     Frequency of Social Gatherings with Friends and Family:     Attends [employer] Services:     Active Member of Clubs or Organizations:     Attends Club or Organization Meetings:     Marital Status:    Intimate Partner Violence:     Fear of Current or Ex-Partner:     Emotionally Abused:     Physically Abused:     Sexually Abused:        Family History   Problem Relation Age of Onset    No Known Problems Mother     No Known Problems Father        Allergies   Allergen Reactions    Lisinopril      Swelling of tongue    Sulfa Antibiotics Other (See Comments)     pancreatitis    Other reaction(s): Other (Please comment)  Pancreas destroy itself    Zestoretic [Lisinopril-Hydrochlorothiazide]      Pancreatitis       No current facility-administered medications on file prior to encounter       Current Outpatient Medications on File Prior to Encounter   Medication Sig    cloNIDine (CATAPRES) 0 1 mg tablet Take 0 1 mg by mouth daily at bedtime     polyethylene glycol (MIRALAX) 17 g packet Take 17 g by mouth daily    senna-docusate sodium (SENOKOT-S) 8 6-50 mg per tablet Take 1 tablet by mouth daily before dinner    albuterol (PROVENTIL HFA,VENTOLIN HFA) 90 mcg/act inhaler     amLODIPine (NORVASC) 10 mg tablet Take 10 mg by mouth daily     amoxicillin (AMOXIL) 500 mg capsule  (Patient not taking: Reported on 7/2/2021)    Ascorbic Acid (VITAMIN C) 1000 MG tablet Take 500 mg by mouth daily  (Patient not taking: Reported on 7/2/2021)    ASPIRIN 81 PO Take 81 mg by mouth daily     Biotin 5 MG CAPS Take 10 mg by mouth daily  (Patient not taking: Reported on 7/2/2021)    carvedilol (COREG) 6 25 mg tablet Take 6 25 mg by mouth 2 (two) times a day with meals     Cholecalciferol (VITAMIN D3 PO) Take 4,000 tablets by mouth daily  (Patient not taking: Reported on 7/2/2021)    Coenzyme Q10 (CO Q 10) 10 MG CAPS Take 200 mg by mouth daily  (Patient not taking: Reported on 7/2/2021)    Flaxseed, Linseed, (FLAXSEED OIL) 1000 MG CAPS Take 1 capsule by mouth daily     FLOVENT DISKUS 250 MCG/BLIST AEPB 1 puff a day in the morning    furosemide (LASIX) 20 mg tablet Take 20 mg by mouth daily     Kelp 150 MCG TABS Take by mouth daily (Patient not taking: Reported on 7/2/2021)    Lecithin 1200 MG CAPS Take by mouth daily  (Patient not taking: Reported on 7/2/2021)    levothyroxine 75 mcg tablet Take 75 mcg by mouth daily     morphine (MS CONTIN) 30 mg 12 hr tablet TAKE 1 TABLET (30 MG TOTAL) BY MOUTH 2 (TWO) TIMES A DAY   MAX DAILY AMOUNT  60 MG    morphine (MS CONTIN) 60 mg 12 hr tablet     Multiple Vitamin (MULTI VITAMIN DAILY PO) Take 1 capsule by mouth daily  (Patient not taking: Reported on 7/2/2021)    Multiple Vitamins-Minerals (PRESERVISION AREDS PO) Take 1 tablet by mouth 2 (two) times a day  (Patient not taking: Reported on 7/2/2021)    Omega-3 Fatty Acids (FISH OIL PO) Take 4,800 mg by mouth  (Patient not taking: Reported on 7/2/2021)    oxyCODONE (ROXICODONE) 5 mg immediate release tablet as needed     predniSONE 20 mg tablet     primidone (MYSOLINE) 50 mg tablet Take 1tab bid, may slowly increase to 2tabs bid    prochlorperazine (COMPAZINE) 10 mg tablet     Red Yeast Rice 600 MG CAPS Take 600 mg by mouth Pt takes 4 per day (Patient not taking: Reported on 7/2/2021)    simvastatin (ZOCOR) 10 mg tablet Take 10 mg by mouth daily at bedtime     Vitamin E 400 units TABS Take 800 Units by mouth daily  (Patient not taking: Reported on 7/2/2021)        Current Facility-Administered Medications   Medication Dose Route Frequency Provider Last Rate    acetaminophen  650 mg Oral Q6H PRN Tedra Keep, CRNP      albuterol  2 puff Inhalation Q4H PRN Tedra Keep, CRNP      aluminum-magnesium hydroxide-simethicone  30 mL Oral Q6H PRN Tedra Keep, CRNP      amLODIPine  10 mg Oral Daily Tedra Keep, CRNP      aspirin  81 mg Oral Daily Tedra Keep, CRNP      bisacodyl  5 mg Oral Daily PRN Tedra Keep, CRNP      carvedilol  6 25 mg Oral BID With Meals Tedra Keep, CRNP      cloNIDine  0 1 mg Oral HS Tedra Keep, CRNP      enoxaparin  30 mg Subcutaneous Daily Tedra Keep, CRNP      fluticasone  2 puff Inhalation Q12H [facility] Tedra Keep, CRNP      guaiFENesin  200 mg Oral Q4H PRN Tedra Keep, CRNP      HYDROmorphone  0 2 mg Intravenous Q4H PRN Alexanderxochitl Manning, LEE      ipratropium-albuterol  3 mL Nebulization 4x Daily Christiana Manning, LEE      levothyroxine  75 mcg Oral Early Morning Alexanderxochitl Manning, LEE      morphine  30 mg Oral Q12H [facility], CRNP      ondansetron  4 mg Intravenous Q6H PRN Christiana Nigel, LEE      oxyCODONE  20 mg Oral BID Christiana Nigel, NUNONP      oxyCODONE  20 mg Oral Q6H PRN Alexanderxochitl Manning, LEE      polyethylene glycol  17 g Oral Daily Christiana Nigel, LEE      primidone  50 mg Oral Q12H [address] Christiana Manning, LEE      simethicone  80 mg Oral 4x Daily PRN AlexanderLEE Gaytan              Vitals:    07/15/21 0257 07/15/21 0600 07/15/21 0732 07/15/21 0743   BP: 129/61  119/89    BP Location:       Pulse: 91  69    Resp: 18  18    Temp: 98 3 °F (36 8 °C)  98 1 °F (36 7 °C)    TempSrc:       SpO2: 91%  96% 97%   Weight:  81 1 kg (178 lb 12 7 oz)         I/O last 3 completed shifts: In: 600 [IV Piggyback:600]  Out: 500 [Urine:500]      Intake/Output Summary (Last 24 hours) at 7/15/2021 1016  Last data filed at 7/15/2021 6857  Gross per 24 hour   Intake 600 ml   Output 500 ml   Net 100 ml       Weight change:     PHYSICAL EXAMINATION:  Gen: Awake, Alert, NAD  Head/eyes: AT/NC, pupils equal and round, Anicteric  ENT: mmm  Neck: Supple, No elevated JVP, trachea midline  Resp:  Upper respiratory wheezes bilaterally  CV: RRR +S1, S2, No m/r/g  Abd: Soft, NT/ND + BS  Ext: +2 LLE +1 RLE edema  Neuro:  Follows commands, moves all extermities  Psych: Appropriate affect, normal mood, pleasant attitude, non-combative  Skin: warm; no rash, erythema or venous stasis changes on exposed skin    Lab Results:  Results from last 7 days   Lab Units 07/15/21  0344   WBC Thousand/uL 7 06   HEMOGLOBIN g/dL 12 3   HEMATOCRIT % 36 5   PLATELETS Thousands/uL 209     Results from last 7 days   Lab Units 07/15/21  0344   POTASSIUM mmol/L 3 5   CHLORIDE mmol/L 99* CO2 mmol/L 32   BUN mg/dL 63*   CREATININE mg/dL 1 56*   CALCIUM mg/dL 8 7     No results found for: TROPONINT      Results from last 7 days   Lab Units 07/14/21 2028   TROPONIN I ng/mL <0 02         Results from last 7 days   Lab Units 07/14/21 2028   INR  1 36*           No results found for this or any previous visit  No results found for this or any previous visit  No results found for this or any previous visit  No results found for this or any previous visit  CXR: No results found for this or any previous visit  No results found for this or any previous visit  ECG:  Sinus tachycardia with possible PACs with aberrant conduction, left axis deviation and RBBB    This note was completed in part utilizing M-Modal Fluency Direct Software  Grammatical errors, random word insertions, spelling mistakes, and incomplete sentences may be an occasional consequence of this system secondary to software limitations, ambient noise, and hardware issues  If you have any questions or concerns about the content, text, or information contained within the body of this dictation, please contact the provider for clarification

## 2021-07-15 NOTE — UTILIZATION REVIEW
Initial Clinical Review    Admission: Date/Time/Statement:   Admission Orders (From admission, onward)     Ordered        07/14/21 2313  Inpatient Admission  Once                   Orders Placed This Encounter   Procedures    Inpatient Admission     Standing Status:   Standing     Number of Occurrences:   1     Order Specific Question:   Level of Care     Answer:   Med Surg [16]     Order Specific Question:   Estimated length of stay     Answer:   More than 2 Midnights     Order Specific Question:   Certification     Answer:   I certify that inpatient services are medically necessary for this patient for a duration of greater than two midnights  See H&P and MD Progress Notes for additional information about the patient's course of treatment  ED Arrival Information     Expected Arrival Acuity    - 7/14/2021 19:52 Urgent         Means of arrival Escorted by Service Ad[facility]         Ar[facility] complaint    sob        Chief Complaint   Patient presents with    Shortness of Breath     Pt reports swelling in legs and incereased SOB, sent by PCP  No known diagnosis of CHF  Initial Presentation:   82  Y O  Male  Referred to ED  By PCP with intermittent shortness of breath for past 6 -8 weeks,  Not  Any worse with activity, denies orthopnea, weight  With minimal fluctuation  Has chest tightness and congestion, productive cough  Does take lasix,  Cardiology  Recommended increasing  Dose on  7/14 as patient  Was significantly short of breath with edema  Does have  Dysphagia but is able to swallow  Small pills  PMH  Is lung cancer with  Bone and brain mets  And  Related pain  Ct  Neck shows no abnormalities  Labs reveal  Potassium  2 8,  BNP  >  1600 and  Elevated creatinine   Admit   IP with SHortness of breath, dysphagia, hypokalemia and BIJAN and plan is pulmonary/cardiology consults, aspiration precautions, monitor labs, crush all meds, tele, replace electrolytes, I & O, daily weight, NPO until seen by speech and O2 as needed  Date:      7/15        Day 2:   Cardiology consult  Feel shortness of  Breath is related to underlying lung disease  Giving an additional dose of  IV  Diuretic and monitor response  Plan 2 DE  Swelling  Seems to be stable  Creatinine  Improved, BNP elevated  Continue current meds  Pulmonary consult  Acute pulmonary insufficiency, multifactorial   Treat as mild COPD  Exacerbation with prednisone X 5 days  Continue nebulizer treatments  Treat with po antibiotics for increased sputum production  Has  Stage IV  Lung cancer with bone mets  And currently on Keytruda  Keep sats  >  89 %  Has no baseline  O2  Needs  Current exercise tolerance poor  Per speech eval  Continue mechanically altered/level 2 diet and thin liquids  Maintain aspiration precautions  Moderate risk for aspiration  Recommend  VBS        ED Triage Vitals   Temperature Pulse Respirations Blood Pressure SpO2   07/14/21 2006 07/14/21 2004 07/14/21 2004 07/14/21 2004 07/14/21 2004   98 7 °F (37 1 °C) 100 18 152/71 95 %      Temp Source Heart Rate Source Patient Position - Orthostatic VS BP Location FiO2 (%)   07/14/21 2006 07/14/21 2004 07/14/21 2004 07/14/21 2004 --   Oral Monitor Lying Right arm       Pain Score       07/14/21 2144       Worst Possible Pain          Wt Readings from Last 1 Encounters:   07/15/21 81 1 kg (178 lb 12 7 oz)     Additional Vital Signs:     --  --  --  --  97 %  None (Room air)  --     07/15/21 07:32:52  98 1 °F (36 7 °C)  69  18  119/89  99  96 %  --  --   07/15/21 02:57:48  98 3 °F (36 8 °C)  91  18  129/61  84  91 %  --  --   07/15/21 0231  --  --  --  --  --  93 %  --  --   07/15/21 00:04:03  --  66  18  153/68  96  92 %  --  --   07/14/21 2211  --  97  18  134/60  87  93 %  None (Room air)  Sitting   07/14/21 2200  --  96  20  --  --  93 %  --  --   07/14/21 2021  --  --  --  --  --  --  None (Room air)  --   07/14/21 2006  98 7 °F (37 1 °C)  --  --  --  --  --  --  --   07/14/21 2004  --  100  18  152/71  --  95 %  None (Room air)           Pertinent Labs/Diagnostic Test Results:   Ct  Chest  ( 7/14)    Right upper lobe, hilar and paratracheal mass measuring 8 2 x 5 1 x 7 6 cm consistent with primary lung malignancy   Encasement of the right mainstem bronchus and proximal segmental bronchi without evidence of endobronchial mass or occlusion  2   Satellite 2 to 3 mm pulmonary nodules in the apical region of the right upper lobe  3   High right paratracheal 1 6 cm lymph node, possibly necrotic   Subcarinal 2 cm lymph node  Ct  Soft tissue of neck  ( 7/14)     No evidence of mass or lymphadenopathy in the neck  2   Right upper lobe and hilar mass consistent with primary lung malignancy, further evaluated on the accompanying chest CT report     EKG    ( 7/14)    ST    HR  112   Complete  RBBB    Left  QRS    Normal T waves    PAC      Results from last 7 days   Lab Units 07/15/21  0344 07/14/21 2028   WBC Thousand/uL 7 06 9 16   HEMOGLOBIN g/dL 12 3 13 0   HEMATOCRIT % 36 5 39 2   PLATELETS Thousands/uL 209 244   NEUTROS ABS Thousands/µL 6 51 6 90         Results from last 7 days   Lab Units 07/15/21  0344 07/14/21 2028   SODIUM mmol/L 139 137   POTASSIUM mmol/L 3 5 2 8*   CHLORIDE mmol/L 99* 93*   CO2 mmol/L 32 40*   ANION GAP mmol/L 8 4   BUN mg/dL 63* 70*   CREATININE mg/dL 1 56* 1 79*   EGFR ml/min/1 73sq m 40 34   CALCIUM mg/dL 8 7 8 6             Results from last 7 days   Lab Units 07/15/21  0344 07/14/21 2028   GLUCOSE RANDOM mg/dL 153* 128           Results from last 7 days   Lab Units 07/14/21 2028   TROPONIN I ng/mL <0 02         Results from last 7 days   Lab Units 07/14/21 2028   PROTIME seconds 16 5*   INR  1 36*   PTT seconds 34         Results from last 7 days   Lab Units 07/15/21  0339   PROCALCITONIN ng/ml <0 05                 Results from last 7 days   Lab Units 07/14/21 2028   NT-PRO BNP pg/mL 1,605* ED Treatment:   Medication Administration from 07/14/2021 1952 to 07/14/2021 2353       Date/Time Order Dose Route Action Comments     07/14/2021 2026 dexamethasone (DECADRON) injection 6 mg 6 mg Intravenous Given      07/14/2021 2148 sodium chloride 0 9 % bolus 500 mL 0 mL Intravenous Stopped      07/14/2021 2031 sodium chloride 0 9 % bolus 500 mL 500 mL Intravenous New Bag      07/14/2021 2144 morphine injection 6 mg 6 mg Intravenous Given      07/14/2021 2344 potassium chloride 20 mEq IVPB (premix) 0 mEq Intravenous Stopped      07/14/2021 2152 potassium chloride 20 mEq IVPB (premix) 20 mEq Intravenous New Bag      07/14/2021 2131 iohexol (OMNIPAQUE) 350 MG/ML injection (SINGLE-DOSE) 100 mL 100 mL Intravenous Given         Present on Admission:   Other dysphagia   Shortness of breath   Benign essential hypertension   Malignant neoplasm of upper lobe of right lung (HCC)   Cancer related pain   Hypokalemia   Acute kidney injury ([facility] [address] )      Admitting Diagnosis: Shortness of breath [R06 02]  Hypokalemia [E87 6]  Hoarse voice quality [R49 0]  BIJAN (acute kidney injury) ([facility] [address] ) [N17 9]  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) [C34 91]  Age/Sex: 82 y o  male  Admission Orders:  Scheduled Medications:  amLODIPine, 10 mg, Oral, Daily  aspirin, 81 mg, Oral, Daily  carvedilol, 6 25 mg, Oral, BID With Meals  cloNIDine, 0 1 mg, Oral, HS  enoxaparin, 30 mg, Subcutaneous, Daily  fluticasone, 2 puff, Inhalation, Q12H [address]  ipratropium-albuterol, 3 mL, Nebulization, 4x Daily  levothyroxine, 75 mcg, Oral, Early Morning  morphine, 30 mg, Oral, Q12H MICHAEL  oxyCODONE, 20 mg, Oral, BID  polyethylene glycol, 17 g, Oral, Daily  primidone, 50 mg, Oral, Q12H MICHAEL      Continuous IV Infusions:     PRN Meds:  acetaminophen, 650 mg, Oral, Q6H PRN  albuterol, 2 puff, Inhalation, Q4H PRN  aluminum-magnesium hydroxide-simethicone, 30 mL, Oral, Q6H PRN  bisacodyl, 5 mg, Oral, Daily PRN  guaiFENesin, 200 mg, Oral, Q4H PRN  HYDROmorphone, 0 2 mg, Intravenous, Q4H PRN  ondansetron, 4 mg, Intravenous, Q6H PRN  oxyCODONE, 20 mg, Oral, Q6H PRN  simethicone, 80 mg, Oral, 4x Daily PRN        IP CONSULT TO CASE MANAGEMENT  IP CONSULT TO PULMONOLOGY  IP CONSULT TO CARDIOLOGY     24  Hr  Tele  Aspiration precautions    Network Utilization Review Department  ATTENTION: Please call with any questions or concerns to [phone] and carefully listen to the prompts so that you are directed to the right person  All voicemails are confidential   [place] Patient all requests for admission clinical reviews, approved or denied determinations and any other requests to dedicated fax number below belonging to the campus where the patient is receiving treatment   List of dedicated fax numbers for the Facilities:  [address] DENIALS (Administrative/Medical Necessity) [phone]   [address] (Maternity/NICU/Pediatrics) [phone]   [address] Dayron [place] [address] [id number] [place] [address] [address] [place] [address] [facility] [facility] [phone]

## 2021-07-15 NOTE — ASSESSMENT & PLAN NOTE
· Creatinine 1 7, baseline 1 0-1 2  · May be cardiorenal, elevated BNP   Hold IVF for now pending 2D echo  · Confirm medications with spouse, it appears he is taking both furosemide and chlorthalidone  · Monitor intake and output  · Monitor lytes  · Avoid NSAIDs, nephrotoxins and hypotension  · Monitor serum creatinine

## 2021-07-15 NOTE — ASSESSMENT & PLAN NOTE
· Reports dysphagia   As per oncologist at [facility] patient has a tumor pressing on his throat  · CT neck:  No evidence of mass or lymphadenopathy in the neck  · Will keep NPO until evaluated by speech/swallow therapist  · Crush meds  · Aspiration precautions

## 2021-07-15 NOTE — H&P
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  Followed outpatient by oncology at [facility]  CT chest: RUL hilar and paratracheal mass measuring 8 2 x 5 1 x 7 6 cm consistent with primary lung malignancy  Encasement of the right mainstem bronchus and proximal segmental bronchi without evidence of endobronchial mass or occlusion  High right paratracheal 1 6 cm lymph node, possibly necrotic  Subcarinal 2 cm lymph node  · No hypoxia, does not require supplemental oxygen  · BNP >1600  · Seen outpatient by cardiology for lower extremity edema, Lasix dose increased  Wheezing noted and said to be related to his malignancy pushing against his trachea  2D echo recommended  · As per Cardiology phone conversation 7/14 patient remains markedly edematous and diuretic has not been helping, will require IV diuretic medication, referred to ED  PLAN:  · Will order 2D echo  · Lasix 60mg x1 ordered  Defer further diuresis to cardiology  · DuoNeb QID  · Sputum culture ordered  · Monitor daily weight, I&O  · Encourage use of incentive spirometer  · Check ambulating O2  · Supplemental oxygen as needed  · Airway clearance protocol  Respiratory protocol  · Pulmonary and cardiology consult    Acute kidney injury [facility])  Assessment & Plan  · Creatinine 1 7, baseline 1 0-1 2  · May be cardiorenal, elevated BNP   Hold IVF for now pending 2D echo  · Confirm medications with spouse, it appears he is taking both furosemide and chlorthalidone  · Monitor intake and output  · Monitor lytes  · Avoid NSAIDs, nephrotoxins and hypotension  · Monitor serum creatinine    Other dysphagia  Assessment & Plan  · Reports dysphagia  As per oncologist at 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)  Assessment & Plan  · Known h/o RUL lung ca with bone and brain mets  On palliative chemotherapy  Followed outpatient by oncology at [facility]    Cancer related pain  Assessment & Plan  · On MS Contin ER 30 mg b i d and oxycodone 20mg QID prn  Verified on PDMP  · Add p r n  IV Dilaudid  · Add Bowel regimen    VTE Prophylaxis: Heparin  / sequential compression device   Code Status: FC  POLST: POLST is not applicable to this patient  Discussion with family:     Anticipated Length of Stay:  Patient will be admitted on an Inpatient basis with an anticipated length of stay of  > 2 midnights  Justification for Hospital Stay:  Shortness of breath, acute kidney injury, ? CHF exac    Total Time for Visit, including Counseling / Coordination of Care: 60 minutes  Greater than 50% of this total time spent on direct patient counseling and coordination of care  Chief Complaint:   Shortness of breath    History of Present Illness:     Opal Newsome is a 82 y o  male who presents with c/o shortness of breath and dysphagia  Patient reports shortness of breath on and off for 6-8 weeks  Reports chest tightness and congestion, cough productive of yellow blood-tinged sputum and difficulty expectorating mucus  Shortness of breath not worse with activity, denies orthopnea, states weight fluctuates between 1 to 2 lbs, states he loses 1-2lbs and gains 1-2lbs  Compliant with furosemide 20mg b i d  Followed outpatient by Dr Yesenia Heller, recommendation made to increase Lasix to 40 mg b i d   As per cardiology telephone conversation with spouse on 7/14, she reported he was significantly short of breath with edema, was recommended echo, BNP and evaluation in ED  Reports dysphagia although states he is able to swallow small pills  Denies fevers  Review of Systems:    Review of Systems   Constitutional: Negative  HENT: Positive for congestion and trouble swallowing  Respiratory: Positive for cough, chest tightness and shortness of breath  Cardiovascular: Positive for leg swelling  Negative for chest pain and palpitations  Gastrointestinal: Negative  Genitourinary: Negative  Musculoskeletal: Positive for back pain  Chronic back pain   Skin: Negative  Neurological: Negative  Psychiatric/Behavioral: Negative  Past Medical and Surgical History:     Past Medical History:   Diagnosis Date    Arthritis     Asthma     Chronic pain disorder     back    Colon polyp     Disease of thyroid gland     hypo    Dystonic tremor     Essential tremor     Hyperlipidemia     Hypertension     Pneumonia     x2    Stroke ([facility] [address] )     unknown and no residual       Past Surgical History:   Procedure Laterality Date    BACK SURGERY      CATARACT EXTRACTION Left     COLONOSCOPY      JOINT REPLACEMENT Left     reverse shoulder    MOHS RECONSTRUCTION N/A 6/3/2020    Procedure: REPAIR MOHS OF THE NOSE;  Surgeon: Burgess Theo MD;  Location: [facility] MAIN OR;  Service: Plastics    DE DELAY/SECTN FLAP LID,NOS,EAR,LIP N/A 7/15/2020    Procedure: DIVIDE NASAL FLAP;  Surgeon: Burgess Theo MD;  Location: [facility] MAIN OR;  Service: Plastics    ROTATOR CUFF REPAIR      SPINE SURGERY      TONSILLECTOMY         Meds/Allergies:    Prior to Admission medications    Medication Sig Start Date End Date Taking?  Authorizing Provider   albuterol (PROVENTIL HFA,VENTOLIN HFA) 90 mcg/act inhaler  12/16/19   Historical Provider, MD   amLODIPine (NORVASC) 10 mg tablet Take 10 mg by mouth daily  4/21/19   Historical Provider, MD   amoxicillin (AMOXIL) 500 mg capsule 2/21/20   Historical Provider, MD   Ascorbic Acid (VITAMIN C) 1000 MG tablet Take 500 mg by mouth daily   Patient not taking: Reported on 7/2/2021    Historical Provider, MD   ASPIRIN 81 PO Take 81 mg by mouth daily     Historical Provider, MD   Biotin 5 MG CAPS Take 10 mg by mouth daily   Patient not taking: Reported on 7/2/2021    Historical Provider, MD   carvedilol (COREG) 6 25 mg tablet Take 6 25 mg by mouth 2 (two) times a day with meals  4/23/19   Historical Provider, MD   Cholecalciferol (VITAMIN D3 PO) Take 4,000 tablets by mouth daily   Patient not taking: Reported on 7/2/2021    Historical Provider, MD   cloNIDine (CATAPRES) 0 1 mg tablet Take 0 1 mg by mouth daily at bedtime   Patient not taking: Reported on 7/2/2021 2/22/19   Historical Provider, MD   Coenzyme Q10 (CO Q 10) 10 MG CAPS Take 200 mg by mouth daily   Patient not taking: Reported on 7/2/2021    Historical Provider, MD   Flaxseed, Linseed, (FLAXSEED OIL) 1000 MG CAPS Take 1 capsule by mouth daily   Patient not taking: Reported on 7/2/2021    Historical Provider, MD   FLOVENT DISKUS 250 MCG/BLIST AEPB 1 puff a day in the morning 4/11/19   Historical Provider, MD   furosemide (LASIX) 20 mg tablet Take 20 mg by mouth daily  3/31/19   Historical Provider, MD   Kelp 150 MCG TABS Take by mouth daily  Patient not taking: Reported on 7/2/2021    Historical Provider, MD   Lecithin 1200 MG CAPS Take by mouth daily   Patient not taking: Reported on 7/2/2021    Historical Provider, MD   levothyroxine 75 mcg tablet Take 75 mcg by mouth daily     Historical Provider, MD   morphine (MS CONTIN) 30 mg 12 hr tablet TAKE 1 TABLET (30 MG TOTAL) BY MOUTH 2 (TWO) TIMES A DAY   MAX DAILY AMOUNT  60 MG 6/9/21   Historical Provider, MD   morphine (MS CONTIN) 60 mg 12 hr tablet  6/30/21   Historical Provider, MD   Multiple Vitamin (MULTI VITAMIN DAILY PO) Take 1 capsule by mouth daily   Patient not taking: Reported on 7/2/2021    Historical Provider, MD   Multiple Vitamins-Minerals (PRESERVISION AREDS PO) Take 1 tablet by mouth 2 (two) times a day   Patient not taking: Reported on 2021    Historical Provider, MD   Omega-3 Fatty Acids (FISH OIL PO) Take 4,800 mg by mouth   Patient not taking: Reported on 2021 6/14/10   Historical Provider, MD   oxyCODONE (ROXICODONE) 5 mg immediate release tablet as needed  19   Historical Provider, MD   predniSONE 20 mg tablet  21   Historical Provider, MD   primidone (MYSOLINE) 50 mg tablet Take 1tab bid, may slowly increase to 2tabs bid 2/10/21   Caryle Collier, PA-C   prochlorperazine (COMPAZINE) 10 mg tablet  21   Historical Provider, MD   Red Yeast Rice 600 MG CAPS Take 600 mg by mouth Pt takes 4 per day  Patient not taking: Reported on 2021 6/22/10   Historical Provider, MD   simvastatin (ZOCOR) 10 mg tablet Take 10 mg by mouth daily at bedtime  3/31/19   Historical Provider, MD   Vitamin E 400 units TABS Take 800 Units by mouth daily   Patient not taking: Reported on 2021    Historical Provider, MD     I have reviewed home medications with patient family member  Allergies: Allergies   Allergen Reactions    Lisinopril      Swelling of tongue    Sulfa Antibiotics Other (See Comments)     pancreatitis    Other reaction(s):  Other (Please comment)  Pancreas destroy itself    Zestoretic [Lisinopril-Hydrochlorothiazide]      Pancreatitis       Social History:     Marital Status: Unknown   Occupation:  Retired  Patient Pre-hospital Living Situation:  Resides at home with spouse  Patient Pre-hospital Level of Mobility:  Ambulatory  Patient Pre-hospital Diet Restrictions:   Substance Use History:   Social History     Substance and Sexual Activity   Alcohol Use Yes    Comment: < 1 drink a month     Social History     Tobacco Use   Smoking Status Former Smoker    Types: Cigarettes    Quit date: 1994    Years since quittin 2   Smokeless Tobacco Current User    Types: Chew   Tobacco Comment currently uses chewing tobacco     Social History     Substance and Sexual Activity   Drug Use Never       Family History:    Family History   Problem Relation Age of Onset    No Known Problems Mother     No Known Problems Father        Physical Exam:     Vitals:   Blood Pressure: 153/68 (07/15/21 0004)  Pulse: 66 (07/15/21 0004)  Temperature: 98 7 °F (37 1 °C) (07/14/21 2006)  Temp Source: Oral (07/14/21 2006)  Respirations: 18 (07/15/21 0004)  Weight - Scale: 81 1 kg (178 lb 12 7 oz) (07/15/21 0056)  SpO2: 92 % (07/15/21 0004)    Physical Exam  Constitutional:       General: He is not in acute distress  Appearance: Normal appearance  He is normal weight  He is not ill-appearing, toxic-appearing or diaphoretic  HENT:      Head: Normocephalic and atraumatic  Comments: Bilateral hearing aids     Nose: No congestion or rhinorrhea  Mouth/Throat:      Mouth: Mucous membranes are dry  Eyes:      Conjunctiva/sclera: Conjunctivae normal    Cardiovascular:      Rate and Rhythm: Normal rate and regular rhythm  Comments: Cough productive of thick tan colored sputum  Pulmonary:      Breath sounds: Rhonchi present  Comments: Rhonchorous throughout  Abdominal:      General: Bowel sounds are normal  There is no distension  Palpations: Abdomen is soft  Tenderness: There is no guarding  Musculoskeletal:      Right lower leg: Edema present  Left lower leg: Edema present  Comments: RLE 2+edema, LLE 3+   Skin:     General: Skin is warm  Coloration: Skin is pale  Neurological:      General: No focal deficit present  Mental Status: He is alert and oriented to person, place, and time  Psychiatric:         Mood and Affect: Mood normal          Behavior: Behavior normal          Thought Content: Thought content normal          Judgment: Judgment normal      Additional Data:     Lab Results: I have personally reviewed pertinent reports        Results from last 7 days   Lab Units 07/14/21 2028   WBC Thousand/uL 9 16   HEMOGLOBIN g/dL 13 0   HEMATOCRIT % 39 2   PLATELETS Thousands/uL 244   NEUTROS PCT % 74   LYMPHS PCT % 5*   MONOS PCT % 11   EOS PCT % 8*     Results from last 7 days   Lab Units 07/14/21 2028   SODIUM mmol/L 137   POTASSIUM mmol/L 2 8*   CHLORIDE mmol/L 93*   CO2 mmol/L 40*   BUN mg/dL 70*   CREATININE mg/dL 1 79*   ANION GAP mmol/L 4   CALCIUM mg/dL 8 6   GLUCOSE RANDOM mg/dL 128     Results from last 7 days   Lab Units 07/14/21 2028   INR  1 36*                   Imaging: I have personally reviewed pertinent reports  CT chest with contrast   Final Result by Fahad Kamara MD (07/14 2219)         1  Right upper lobe, hilar and paratracheal mass measuring 8 2 x 5 1 x 7 6 cm consistent with primary lung malignancy  Encasement of the right mainstem bronchus and proximal segmental bronchi without evidence of endobronchial mass or occlusion  2   Satellite 2 to 3 mm pulmonary nodules in the apical region of the right upper lobe  3   High right paratracheal 1 6 cm lymph node, possibly necrotic  Subcarinal 2 cm lymph node  Workstation performed: [id number]         CT soft tissue neck with contrast   Final Result by Fahad Kamara MD (07/14 2152)         1  No evidence of mass or lymphadenopathy in the neck  2   Right upper lobe and hilar mass consistent with primary lung malignancy, further evaluated on the accompanying chest CT report  Workstation performed: [id number]             EKG, Pathology, and Other Studies Reviewed on Admission:   · ct    Allscripts / Epic Records Reviewed: Yes     ** Please Note: This note has been constructed using a voice recognition system   **

## 2021-07-15 NOTE — ASSESSMENT & PLAN NOTE
· BIJAN secondary to volume overload  · Monitor with administration of furosemide      Results from last 7 days   Lab Units 07/15/21  0344 07/14/21 2028   BUN mg/dL 63* 70*   CREATININE mg/dL 1 56* 1 79*   EGFR ml/min/1 73sq m 40 34

## 2021-07-15 NOTE — NURSING NOTE
Patient given lasix at 1115 and only produced 300 mL urine by 1900  Bladder scanned for 16 mL but suspect inaccurate results as patient was not relaxing stomach muscles and pushing against bladder scanner despite multiple attempts to calm patient  Bladder feels hard and distended  SLIM notified and asked nursing staff to continue to monitor  Oncoming night shift nurse notified of importance of monitoring urination and potential need to catheterize if no void  Patient resting comfortably with call bell in hand and family at bedside with bed alarm on

## 2021-07-15 NOTE — ASSESSMENT & PLAN NOTE
Malnutrition Findings:   Adult Malnutrition type: Acute illness  Adult Degree of Malnutrition: Other severe protein calorie malnutrition (Severe pro/rené malnutrition r/t cancer related issues as evidenced by 4% unplanned wt loss since 7/2/21, consuming < 50% energy intake compared to estimated energy needs > 5 days  Treated with modified diet and Enlive tid)  BMI Findings: Body mass index is 27 19 kg/m²

## 2021-07-15 NOTE — PROGRESS NOTES
[address] [facility]  Progress Note - Rosa Wilder 1935, 82 y o  male MRN: [id number]  Unit/Bed#: E5 -01 Encounter: [id number]  Primary Care Provider: Hari Islas DO   Date and time admitted to hospital: 7/14/2021  7:59 PM    * Acute respiratory insufficiency  Assessment & Plan  · Likely in the setting of lung cancer however symptoms did improve with diuretics  · Cardiology dosing furosemide  · Pulmonology started prednisone with azithromycin  Severe protein-calorie malnutrition ([facility] [address] )  Assessment & Plan  Malnutrition Findings:   Adult Malnutrition type: Acute illness  Adult Degree of Malnutrition: Other severe protein calorie malnutrition (Severe pro/rené malnutrition r/t cancer related issues as evidenced by 4% unplanned wt loss since 7/2/21, consuming < 50% energy intake compared to estimated energy needs > 5 days  Treated with modified diet and Enlive tid)  BMI Findings: Body mass index is 27 19 kg/m²  Acute kidney injury ([facility] [address] )  Assessment & Plan  · BIJAN secondary to volume overload  · Monitor with administration of furosemide  Results from last 7 days   Lab Units 07/15/21  0344 07/14/21 2028   BUN mg/dL 63* 70*   CREATININE mg/dL 1 56* 1 79*   EGFR ml/min/1 73sq m 40 34       Hypokalemia  Assessment & Plan  · Replete and recheck in a m  Results from last 7 days   Lab Units 07/15/21  0344 07/14/21 2028   POTASSIUM mmol/L 3 5 2 8*       Cancer related pain  Assessment & Plan  · Continue morphine ER and oxycodone p r n  Malignant neoplasm of upper lobe of right lung [facility])  Assessment & Plan  · 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 follows with [facility] oncology    Other dysphagia  Assessment & Plan  · Dysphagia with hoarseness    Advised this is secondary to lung malignancy  · Discussed with pathologist:  For V BS tomorrow  · Have ENT evaluate    Benign essential hypertension  Assessment & Plan  · Continue amlodipine carvedilol and clonidine      VTE Pharmacologic Prophylaxis: VTE Score: 8 High Risk (Score >/= 5) - Pharmacological DVT Prophylaxis Ordered: Enoxaparin (Lovenox)  Sequential Compression Devices Ordered  Patient Centered Rounds: I have performed bedside rounds with nursing staff today  Discussions with Specialists or Other Care Team Provider:  Pulmonology    Education and Discussions with Family / Patient:  Spouse at bedside    Time Spent for Care: 25 mins  More than 50% of total time spent on counseling and coordination of care as described above  Current Length of Stay: 1 day(s)  Current Patient Status: Inpatient   Certification Statement: The patient will continue to require additional inpatient hospital stay due to pulmonary insufficiency  Discharge Plan / Estimated Discharge Date: 24-48 hours    Code Status: Level 1 - Full Code      Subjective:   Patient seen and examined  Still short of breath  Leg still swollen  Objective:   Vitals: Blood pressure 136/63, pulse 97, temperature 97 9 °F (36 6 °C), resp  rate 18, weight 81 1 kg (178 lb 12 7 oz), SpO2 95 %  Physical Exam  Vitals reviewed  Constitutional:       General: He is not in acute distress  Cardiovascular:      Rate and Rhythm: Regular rhythm  Heart sounds: Normal heart sounds  Pulmonary:      Effort: Pulmonary effort is normal       Breath sounds: Decreased breath sounds present  No wheezing  Abdominal:      General: Bowel sounds are normal       Palpations: Abdomen is soft  Tenderness: There is no abdominal tenderness  There is no rebound  Musculoskeletal:         General: Swelling (Lower extremities bilaterally) present  No tenderness  Skin:     General: Skin is warm and dry  Neurological:      General: No focal deficit present  Mental Status: He is alert and oriented to person, place, and time  Cranial Nerves: No cranial nerve deficit     Psychiatric:         Mood and Affect: Mood normal        Additional Data:   Labs:  Results from last 7 days   Lab Units 07/15/21  0344 07/14/21 2028   WBC Thousand/uL 7 06 9 16   HEMOGLOBIN g/dL 12 3 13 0   HEMATOCRIT % 36 5 39 2   MCV fL 90 91   PLATELETS Thousands/uL 209 244   INR   --  1 36*     Results from last 7 days   Lab Units 07/15/21  0344 07/14/21 2028   SODIUM mmol/L 139 137   POTASSIUM mmol/L 3 5 2 8*   CHLORIDE mmol/L 99* 93*   CO2 mmol/L 32 40*   ANION GAP mmol/L 8 4   BUN mg/dL 63* 70*   CREATININE mg/dL 1 56* 1 79*   CALCIUM mg/dL 8 7 8 6   EGFR ml/min/1 73sq m 40 34   GLUCOSE RANDOM mg/dL 153* 128         Results from last 7 days   Lab Units 07/14/21 2028   TROPONIN I ng/mL <0 02     Results from last 7 days   Lab Units 07/14/21 2028   NT-PRO BNP pg/mL 1,605*      Results from last 7 days   Lab Units 07/15/21  0339   PROCALCITONIN ng/ml <0 05                 * I Have Reviewed All Lab Data Listed Above  Cultures:                   Lines/Drains:  Invasive Devices     Peripheral Intravenous Line            Peripheral IV 07/15/21 Right Forearm <1 day              Telemetry:      Imaging:  Imaging Reports Reviewed Today Include:   CT soft tissue neck with contrast    Result Date: 7/14/2021  Impression: 1  No evidence of mass or lymphadenopathy in the neck  2   Right upper lobe and hilar mass consistent with primary lung malignancy, further evaluated on the accompanying chest CT report  Workstation performed: [id number]     CT chest with contrast    Result Date: 7/14/2021  Impression: 1  Right upper lobe, hilar and paratracheal mass measuring 8 2 x 5 1 x 7 6 cm consistent with primary lung malignancy  Encasement of the right mainstem bronchus and proximal segmental bronchi without evidence of endobronchial mass or occlusion  2   Satellite 2 to 3 mm pulmonary nodules in the apical region of the right upper lobe  3   High right paratracheal 1 6 cm lymph node, possibly necrotic  Subcarinal 2 cm lymph node   Workstation performed: [id number]     Scheduled Meds:  Current Facility-Administered Medications   Medication Dose Route Frequency Provider Last Rate    acetaminophen  650 mg Oral Q6H PRN Azzie Carota, CRNP      albuterol  2 puff Inhalation Q4H PRN Azzie Carota, CRNP      aluminum-magnesium hydroxide-simethicone  30 mL Oral Q6H PRN Azzie Carota, CRNP      amLODIPine  10 mg Oral Daily Azzie Carota, CRNP      aspirin  81 mg Oral Daily Azzie Carota, CRNP      azithromycin  500 mg Oral Q24H Deri Primus, CRNP      bisacodyl  5 mg Oral Daily PRN Azzie Carota, CRNP      budesonide  0 5 mg Nebulization Q12H Deri Primus, CRNP      carvedilol  6 25 mg Oral BID With Meals Azzie Carota, CRNP      cloNIDine  0 1 mg Oral HS Azzie Carota, CRNP      enoxaparin  30 mg Subcutaneous Daily Azzie Carota, CRNP      guaiFENesin  200 mg Oral Q4H PRN Azzie Carota, CRNP      HYDROmorphone  0 2 mg Intravenous Q4H PRN Azzie Carota, CRNP      ipratropium-albuterol  3 mL Nebulization TID Karie Bile, DO      ipratropium-albuterol  3 mL Nebulization Q6H PRN Karie Bile, DO      levothyroxine  75 mcg Oral Early Morning Azzie Carota, CRNP      morphine  30 mg Oral Q12H [address] Azzie Carota, CRNP      ondansetron  4 mg Intravenous Q6H PRN Azzie Carota, CRNP      oxyCODONE  20 mg Oral BID Azzie Carota, CRNP      oxyCODONE  20 mg Oral Q6H PRN Azzie Carota, CRNP      polyethylene glycol  17 g Oral Daily Azzie Carota, [address]      [START ON 7/16/2021] predniSONE  40 mg Oral Daily Deri Primus, CRNP      primidone  50 mg Oral Q12H [address] Azzie Carota, CRNP      simethicone  80 mg Oral 4x Daily PRN Azzie Carota, CRNP         Karie Bile, DO  [address] Internal [facility]  Hospitalist    ** Please Note: This note has been constructed using a voice recognition system   **

## 2021-07-15 NOTE — SPEECH THERAPY NOTE
Speech-Language Pathology Bedside Swallow Evaluation      Patient Name: Robin Cheadle    SXMNA'V Date: 7/15/2021     Problem List  Principal Problem:    Shortness of breath  Active Problems:    Benign essential hypertension    Other dysphagia    Malignant [facility] of upper lobe of right lung ([facility] )    Cancer related pain    Hypokalemia    Acute kidney injury [facility])      Past Medical History  Past Medical History:   Diagnosis Date    Arthritis     Asthma     Chronic pain disorder     back    Colon polyp     Disease of thyroid gland     hypo    Dystonic tremor     Essential tremor     Hyperlipidemia     Hypertension     Pneumonia     x2    Stroke ([facility] )     unknown and no residual       Past Surgical History  Past Surgical History:   Procedure Laterality Date    BACK SURGERY      CATARACT EXTRACTION Left     COLONOSCOPY      JOINT REPLACEMENT Left     reverse shoulder    MOHS RECONSTRUCTION N/A 6/3/2020    Procedure: REPAIR MOHS OF THE NOSE;  Surgeon: Thomas Ramírez MD;  Location: [facility] MAIN OR;  Service: Plastics    CA DELAY/SECTN FLAP LID,NOS,EAR,LIP N/A 7/15/2020    Procedure: DIVIDE NASAL FLAP;  Surgeon: Thomas Ramírez MD;  Location: [facility] MAIN OR;  Service: Plastics    ROTATOR CUFF REPAIR      SPINE SURGERY      TONSILLECTOMY         Summary   Pt presented with functional appearing oral stage of swallowing with s/s suggestive of suspected mild pharyngeal dysphagia  Symptoms or concerns included suspected decreased hyolaryngeal elevation upon palpation, suspected pharyngeal residue, multiple swallows and intermittent coughing   The patient reports the joel cracker is too dry, but does swallow it without difficulty  Family is present who report patient has been eating a lot of home made bread pudding at home       Risk/s for Aspiration: moderate      Recommended Diet: mechanically altered/level 2 diet and thin liquids   Recommended Form of Meds: whole with liquid and whole with puree   Aspiration precautions and swallowing strategies: upright posture and alternating bites and sips  Other Recommendations: Continue frequent oral care  VBS to instrumentally assess swallow function     Current Medical Status  Mariela Martínez is a 82 y o  male who presents with c/o shortness of breath and dysphagia  Patient reports shortness of breath on and off for 6-8 weeks  Reports chest tightness and congestion, cough productive of yellow blood-tinged sputum and difficulty expectorating mucus  Shortness of breath not worse with activity, denies orthopnea, states weight fluctuates between 1 to 2 lbs, states he loses 1-2lbs and gains 1-2lbs  Compliant with furosemide 20mg b i d  Followed outpatient by Dr Briadna Del Castillo, recommendation made to increase Lasix to 40 mg b i d  As per cardiology telephone conversation with spouse on 7/14, she reported he was significantly short of breath with edema, was recommended echo, BNP and evaluation in ED  Reports dysphagia although states he is able to swallow small pills  Denies fevers      Current Precautions:  Fall  Aspiration    Allergies:  No known food allergies  Past medical history:  Please see H&P for details    Special Studies:  CT soft tissue of neck 7/14/2021: 1  No evidence of mass or lymphadenopathy in the neck  2   Right upper lobe and hilar mass consistent with primary lung malignancy, further evaluated on the accompanying chest CT report      Social/Education/Vocational Hx:  Pt lives with family    Swallow Information   Current Risks for Dysphagia & Aspiration: lung ca and RUL and hilar mass  Current Symptoms/Concerns: coughing with po  Current Diet: NPO   Baseline Diet: soft/level 3 diet and thin liquids    Baseline Assessment   Behavior/Cognition: alert  Speech/Language Status: able to participate in conversation and able to follow commands  Patient Positioning: upright in chair  Pain Status/Interventions/Response to Interventions: No report of or nonverbal indications of pain      Swallow Mechanism Exam  Facial: symmetrical  Labial: WFL  Lingual: WFL  Velum: symmetrical  Mandible: adequate ROM  Dentition: adequate  Vocal quality: hoarse and breathy  Patient reports this has been for a few weeks  ?ENT consult    Volitional Cough: strong/productive     Consistencies Assessed and Performance   Consistencies Administered: thin liquids, puree and soft solids  Materials administered included applesauce and joel crackers with thin liquids     Oral Stage: WFL  Mastication was adequate with the materials administered today  Bolus formation and transfer were functional with no significant oral residue noted  No overt s/s reduced oral control  Pharyngeal Stage: mild  Swallow initiation time appears delayed with all with decreased laryngeal rise upon palpation  The patient requires multiple swallows to clear  Observed with intermittent cough throughout trials  Esophageal Concerns: none reported    Summary and Recommendations (see above)    Results Reviewed with: patient, RN, MD and family     Treatment Recommended: dysphagia therapy      Frequency of treatment: 3-5x week, as able     Patient Stated Goal: none     Dysphagia LTG  -Patient will demonstrate safe and effective oral intake (without overt s/s significant oral/pharyngeal dysphagia including s/s penetration or aspiration) for the highest appropriate diet level       Short Term Goals:  -Pt will tolerate Dysphagia 2/mechanical soft diet and  thin liquid with no significant s/s oral or pharyngeal dysphagia across 1-3 diagnostic session/s     -Patient will tolerate trials of upgraded food and/or liquid texture with no significant s/s of oral or pharyngeal dysphagia including aspiration across 1-3 diagnostic sessions     -Patient will comply with a Video/Modified Barium Swallow study for more complete assessment of swallowing anatomy/physiology/aspiration risk and to assess efficacy of treatment techniques so as to best guide treatment plan    Speech Therapy Prognosis   Prognosis: fair    Prognosis Considerations: medical status, progressive disease process and medically fragile status

## 2021-07-15 NOTE — PLAN OF CARE
Problem: Potential for Falls  Goal: Patient will remain free of falls  Description: INTERVENTIONS:  - Educate patient/family on patient safety including physical limitations  - Instruct patient to call for assistance with activity   - Consult OT/PT to assist with strengthening/mobility   - Keep Call bell within reach  - Keep bed low and locked with side rails adjusted as appropriate  - Keep care items and personal belongings within reach  - Initiate and maintain comfort rounds  - Make Fall Risk Sign visible to staff  - Offer Toileting in advance of need  - Initiate/Maintain bed alarm  - Obtain necessary fall risk management equipment  - Apply yellow socks and bracelet for high fall risk patients  - Consider moving patient to room near nurses station  Outcome: Progressing     Problem: PAIN - ADULT  Goal: Verbalizes/displays adequate comfort level or baseline comfort level  Description: Interventions:  - Encourage patient to monitor pain and request assistance  - Assess pain using appropriate pain scale  - Administer analgesics based on type and severity of pain and evaluate response  - Implement non-pharmacological measures as appropriate and evaluate response  - Consider cultural and social influences on pain and pain management  - Notify physician/advanced practitioner if interventions unsuccessful or patient reports new pain  Outcome: Progressing     Problem: Nutrition/Hydration-ADULT  Goal: Nutrient/Hydration intake appropriate for improving, restoring or maintaining nutritional needs  Description: Monitor and assess patient's nutrition/hydration status for malnutrition  Collaborate with interdisciplinary team and initiate plan and interventions as ordered  Monitor patient's weight and dietary intake as ordered or per policy  Utilize nutrition screening tool and intervene as necessary  Determine patient's food preferences and provide high-protein, high-caloric foods as appropriate       INTERVENTIONS:  - Monitor oral intake, urinary output, labs, and treatment plans  - Assess nutrition and hydration status and recommend course of action  - Evaluate amount of meals eaten  - Assist patient with eating if necessary   - Allow adequate time for meals  - Recommend/ encourage appropriate diets, oral nutritional supplements, and vitamin/mineral supplements  - Order, calculate, and assess calorie counts as needed  - Recommend, monitor, and adjust tube feedings and TPN/PPN based on assessed needs  - Assess need for intravenous fluids  - Provide specific nutrition/hydration education as appropriate  - Include patient/family/caregiver in decisions related to nutrition  Outcome: Progressing

## 2021-07-15 NOTE — PLAN OF CARE
Summary   Pt presented with functional appearing oral stage of swallowing with s/s suggestive of suspected mild pharyngeal dysphagia  Symptoms or concerns included suspected decreased hyolaryngeal elevation upon palpation, suspected pharyngeal residue, multiple swallows and intermittent coughing   The patient reports the joel cracker is too dry, but does swallow it without difficulty  Family is present who report patient has been eating a lot of home made bread pudding at home  Risk/s for Aspiration: moderate      Recommended Diet: mechanically altered/level 2 diet and thin liquids   Recommended Form of Meds: whole with liquid and whole with puree   Aspiration precautions and swallowing strategies: upright posture and alternating bites and sips  Other Recommendations: Continue frequent oral care   VBS to instrumentally assess swallow function

## 2021-07-15 NOTE — CONSULTS
Pulmonary Consultation   Allan Banks 82 y o  male MRN: [id number]  Unit/Bed#: E5 -01 Encounter: [id number]      Reason for consultation: shortness of breath    Requesting physician: Sukhdev Lyn    Impressions/Recommendations:  1  Acute pulmonary insufficiency multifactorial secondary to below  1  Pulmonary toilet: IS, cough deep breathe, increase activity as tolerated  2  Titrate supplemental O2 as needed to maintain SpO2 >/=89%  2  Suspected COPD of unknown severity/asthma with acute exacerbation  1  Add zithromax 500mg x 3 days  2  Add prednisone 40mg x 5 days  3  Continue duoneb TID  4  Add budesonide BID  5  Check sputum culture  6  Recommend transition to Anoro at discharge-D/C flovent  7  Outpatient pulmonary follow up at [facility]  3  Acute volume overload  1  Continue diuresis per IM  2  Echocardiogram pending  3  Daily weights  4  Strict I &O  4  Squamous cell carcinoma  1  S/p 1 dose of chemotherapy, radiation and currently on Keytruda  2  Follow up [facility] oncology    History of Present Illness   HPI:  Allan Banks is a 82 y o  male seen in consult for shortness of breath  Medical history significant for: 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, asthma/copd, tobacco abuse history-wuit date 1994, HLD, tremor, hypothyroidism, CVA and arthritis  Presented with : cough, increased green sputum, bilateral lower extremity edema and dyspnea  Also reports chronic hemoptysis since diagnosis of lung CA in may, 21  At the time of evaluation he reports: cough with green sputum, dyspnea and edema over baseline  Hemoptysis is chronic  Denies: chest pain, fevers, chills, or dizziness    From a pulmonary standpoint he follows at [facility]   Has a history of asthma/COPD  Tobacco abuse quit 1994-smoked 1-2 ppd for 30 years  Current tobacco use-none    Current pulmonary medication regimen consists of Flovent and PRN albuterol ( uses 4 times per day ) and albuterol nebulizer up to 4 times per day   no history of intubations  + history of hospitalizations due to breathing  Baseline supplemental oxygen needs are none  Current exercise tolerance poor    Denies GERD, ALVIN, Dysphagia  No recent sick contacts, exposures or travel  Review of systems:  12 point review of systems was completed and was otherwise negative except as listed in HPI        Historical Information   Past Medical History:   Diagnosis Date    Arthritis     Asthma     Chronic pain disorder     back    Colon polyp     Disease of thyroid gland     hypo    Dystonic tremor     Essential tremor     Hyperlipidemia     Hypertension     Pneumonia     x2    Stroke [facility] )     unknown and no residual     Past Surgical History:   Procedure Laterality Date    BACK SURGERY      CATARACT EXTRACTION Left     COLONOSCOPY      JOINT REPLACEMENT Left     reverse shoulder    MOHS RECONSTRUCTION N/A 6/3/2020    Procedure: REPAIR MOHS OF THE NOSE;  Surgeon: Lupe Kelin MD;  [facility]  Service: Plastics    CT DELAY/SECTN FLAP LID,NOS,EAR,LIP N/A 7/15/2020    Procedure: DIVIDE NASAL FLAP;  Surgeon: Lupe Klein MD;  [facility];  Service: Plastics    ROTATOR CUFF REPAIR      SPINE SURGERY      TONSILLECTOMY       Family History   Problem Relation Age of Onset    No Known Problems Mother     No Known Problems Father        Occupational history: non contributory-retired sales    Tobacco history: 30 years 1-2 ppd    Meds/Allergies   Current Facility-Administered Medications   Medication Dose Route Frequency    acetaminophen (TYLENOL) tablet 650 mg  650 mg Oral Q6H PRN    albuterol (PROVENTIL HFA,VENTOLIN HFA) inhaler 2 puff  2 puff Inhalation Q4H PRN    aluminum-magnesium hydroxide-simethicone (MYLANTA) oral suspension 30 mL  30 mL Oral Q6H PRN    amLODIPine (NORVASC) tablet 10 mg  10 mg Oral Daily    aspirin chewable tablet 81 mg  81 mg Oral Daily    bisacodyl (DULCOLAX) EC tablet 5 mg  5 mg Oral Daily PRN    carvedilol (COREG) tablet 6 25 mg  6 25 mg Oral BID With Meals    cloNIDine (CATAPRES) tablet 0 1 mg  0 1 mg Oral HS    enoxaparin (LOVENOX) subcutaneous injection 30 mg  30 mg Subcutaneous Daily    fluticasone (FLOVENT HFA) 220 mcg/act inhaler 2 puff  2 puff Inhalation Q12H [facility]    guaiFENesin (ROBITUSSIN) oral solution 200 mg  200 mg Oral Q4H PRN    HYDROmorphone HCl (DILAUDID) injection 0 2 mg  0 2 mg Intravenous Q4H PRN    ipratropium-albuterol (DUO-NEB) 0 5-2 5 mg/3 mL inhalation solution 3 mL  3 mL Nebulization TID    ipratropium-albuterol (DUO-NEB) 0 5-2 5 mg/3 mL inhalation solution 3 mL  3 mL Nebulization Q6H PRN    levothyroxine tablet 75 mcg  75 mcg Oral Early Morning    morphine (MS CONTIN) ER tablet 30 mg  30 mg Oral Q12H [facility]    ondansetron (ZOFRAN) injection 4 mg  4 mg Intravenous Q6H PRN    oxyCODONE (ROXICODONE) immediate release tablet 20 mg  20 mg Oral BID    oxyCODONE (ROXICODONE) immediate release tablet 20 mg  20 mg Oral Q6H PRN    polyethylene glycol (MIRALAX) packet 17 g  17 g Oral Daily    primidone (MYSOLINE) tablet 50 mg  50 mg Oral Q12H MICHAEL    simethicone (MYLICON) chewable tablet 80 mg  80 mg Oral 4x Daily PRN     Medications Prior to Admission   Medication    cloNIDine (CATAPRES) 0 1 mg tablet    polyethylene glycol (MIRALAX) 17 g packet    senna-docusate sodium (SENOKOT-S) 8 6-50 mg per tablet    albuterol (PROVENTIL HFA,VENTOLIN HFA) 90 mcg/act inhaler    amLODIPine (NORVASC) 10 mg tablet    amoxicillin (AMOXIL) 500 mg capsule    Ascorbic Acid (VITAMIN C) 1000 MG tablet    ASPIRIN 81 PO    Biotin 5 MG CAPS    carvedilol (COREG) 6 25 mg tablet    Cholecalciferol (VITAMIN D3 PO)    Coenzyme Q10 (CO Q 10) 10 MG CAPS    Flaxseed, Linseed, (FLAXSEED OIL) 1000 MG CAPS    FLOVENT DISKUS 250 MCG/BLIST AEPB    furosemide (LASIX) 20 mg tablet    Kelp 150 MCG TABS    Lecithin 1200 MG CAPS    levothyroxine 75 mcg tablet    morphine (MS CONTIN) 30 mg 12 hr tablet    morphine (MS CONTIN) 60 mg 12 hr tablet    Multiple Vitamin (MULTI VITAMIN DAILY PO)    Multiple Vitamins-Minerals (PRESERVISION AREDS PO)    Omega-3 Fatty Acids (FISH OIL PO)    oxyCODONE (ROXICODONE) 5 mg immediate release tablet    predniSONE 20 mg tablet    primidone (MYSOLINE) 50 mg tablet    prochlorperazine (COMPAZINE) 10 mg tablet    Red Yeast Rice 600 MG CAPS    simvastatin (ZOCOR) 10 mg tablet    Vitamin E 400 units TABS     Allergies   Allergen Reactions    Lisinopril      Swelling of tongue    Sulfa Antibiotics Other (See Comments)     pancreatitis    Other reaction(s): Other (Please comment)  Pancreas destroy itself    Zestoretic [Lisinopril-Hydrochlorothiazide]      Pancreatitis       Vitals: Blood pressure 140/66, pulse 87, temperature 98 1 °F (36 7 °C), resp  rate 18, weight 81 1 kg (178 lb 12 7 oz), SpO2 95 % , RA, Body mass index is 27 19 kg/m²        Intake/Output Summary (Last 24 hours) at 7/15/2021 1413  Last data filed at 7/15/2021 9966  Gross per 24 hour   Intake 600 ml   Output 500 ml   Net 100 ml       Physical exam:    General Appearance:    Alert, cooperative, mild conversational dyspnea no accessory     muscle use       Head/eyes:    Normocephalic, without obvious abnormality, atraumatic,         PERRL, extraocular muscles intact, no scleral icterus    Nose:   Nares normal, septum midline, mucosa normal, no drainage    or sinus tenderness   Throat:   Moist mucous membranes, no thrush   Neck:   Supple, trachea midline, no adenopathy; no carotid    bruit or JVD   Lungs:     Bilateral expiratory wheezes   Chest Wall:    No tenderness or deformity    Heart:    Regular rate and rhythm, S1 and S2 normal, no murmur, rub   or gallop   Abdomen:     Soft, non-tender, bowel sounds active all four quadrants,     no masses, no organomegaly   Extremities:   Extremities normal, atraumatic, no cyanosis 2 + pitting pedal edema   Skin:   Warm, dry, turgor normal, no rashes or lesions   Lymph nodes:   Cervical and supraclavicular nodes normal   Neurologic:   CNII-XII intact, normal strength, non-focal         Labs: I have personally reviewed pertinent lab results  , CBC:   Lab Results   Component Value Date    WBC 7 06 07/15/2021    HGB 12 3 07/15/2021    HCT 36 5 07/15/2021    MCV 90 07/15/2021     07/15/2021    MCH 30 4 07/15/2021    MCHC 33 7 07/15/2021    RDW 12 8 07/15/2021    MPV 10 8 07/15/2021    NRBC 0 07/15/2021   , CMP:   Lab Results   Component Value Date    SODIUM 139 07/15/2021    K 3 5 07/15/2021    CL 99 (L) 07/15/2021    CO2 32 07/15/2021    BUN 63 (H) 07/15/2021    CREATININE 1 56 (H) 07/15/2021    CALCIUM 8 7 07/15/2021    EGFR 40 07/15/2021       Imaging and other studies: I have personally reviewed pertinent reports   , I have personally reviewed pertinent films in PACS and Chest CT 7/14/21  FINDINGS:     LUNGS:  Spiculated right upper lobe mass extending to the major minor fissures, right paratracheal region suprahilar region measuring 8 2 x 5 1 x 7 6 cm  Mass extends to the pleura laterally  Satellite pulmonary nodules in the apical region of the   right upper lobe measuring 2 to 3 mm and adjacent scarring  Calcified granuloma in the left lower lobe      Encasement of the right mainstem bronchus and proximal upper lobe segmental bronchi  Encasement of the origins of the right middle and lower lobe segmental bronchi  Narrowing of segmental and subsegmental bronchi without occlusion or endobronchial   lesion  Pulmonary function testing: none    EKG, Pathology, and Other Studies: I have personally reviewed pertinent reports     and ST with PACs    Code Status: Level 1 - Full Code      LEE Cha

## 2021-07-15 NOTE — UTILIZATION REVIEW
Inpatient Admission Authorization Request   NOTIFICATION OF INPATIENT ADMISSION/INPATIENT AUTHORIZATION REQUEST   SERVICING FACILITY:   [address], [facility], [address]   Tax ID: [id number]  NPI: [id number]  Place of Service: Inpatient [facility]  [address]  Place of Service Code: 24     ATTENDING PROVIDER:  Attending Name and NPI#: Lupis Voss [[id number]]  Address: [address] [place] [address], [facility], [facility] [address]   Phone: [phone]     UTILIZATION REVIEW CONTACT:  Kristen Baugh, Utilization   Network Utilization Review Department  Phone: [phone]  Fax: [phone]  Email: [email or web address]     PHYSICIAN ADVISORY SERVICES:  FOR BCNB-OA-ADXX REVIEW - MEDICAL NECESSITY DENIAL  Phone: [phone]  Fax: [phone]  Email: [email or web address]     TYPE OF REQUEST:  Inpatient Status     ADMISSION INFORMATION:  ADMISSION DATE/TIME: 7/14/21 11:13 PM  PATIENT DIAGNOSIS CODE/DESCRIPTION:  Shortness of breath [R06 02]  Hypokalemia [E87 6]  Hoarse voice quality [R49 0]  BIJAN (acute kidney injury) ([facility] ) [N17 9]  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) [C34 91]  DISCHARGE DATE/TIME: No discharge date for patient encounter  DISCHARGE DISPOSITION (IF DISCHARGED): Home/Self Care     IMPORTANT INFORMATION:  Please contact the Tammy Harper directly with any questions or concerns regarding this request  Department voicemails are confidential     Send requests for admission clinical reviews, concurrent reviews, approvals, and administrative denials due to lack of clinical to fax [phone]

## 2021-07-15 NOTE — ASSESSMENT & PLAN NOTE
· Presenting complaint shortness of breath  Known h/o lung cancer RUL with bone and brain mets  Followed outpatient by oncology at [facility]  CT chest: RUL hilar and paratracheal mass measuring 8 2 x 5 1 x 7 6 cm consistent with primary lung malignancy  Encasement of the right mainstem bronchus and proximal segmental bronchi without evidence of endobronchial mass or occlusion  High right paratracheal 1 6 cm lymph node, possibly necrotic  Subcarinal 2 cm lymph node  · No hypoxia, does not require supplemental oxygen  · BNP >1600  · Seen outpatient by cardiology for lower extremity edema, Lasix dose increased  Wheezing noted and said to be related to his malignancy pushing against his trachea  2D echo recommended  · As per Cardiology phone conversation 7/14 patient remains markedly edematous and diuretic has not been helping, will require IV diuretic medication, referred to ED  PLAN:  · Will order 2D echo  · Lasix 60mg x1 ordered  Defer further diuresis to cardiology  · DuoNeb QID  · Sputum culture ordered  · Monitor daily weight, I&O  · Encourage use of incentive spirometer  · Check ambulating O2  · Supplemental oxygen as needed  · Airway clearance protocol   Respiratory protocol  · Pulmonary and cardiology consult

## 2021-07-15 NOTE — ASSESSMENT & PLAN NOTE
· Known h/o RUL lung ca with bone and brain mets  On palliative chemotherapy   Followed outpatient by oncology at [address] [place] [address] [id number]

## 2021-07-15 NOTE — ASSESSMENT & PLAN NOTE
· Dysphagia with hoarseness    Advised this is secondary to lung malignancy  · Discussed with pathologist:  For V BS tomorrow  · Have ENT evaluate

## 2021-07-15 NOTE — CASE MANAGEMENT
CM met with patient and explained the CM role  Patient lives with his SO in a single story home with 1-2 TOY  Patient denies VNA, DMEs, inpatient MH, ETOH, or drug  Patient reports that he drives and will have his son take him home from the hospital  Patient reports RX coverage from [employer] and uses the [facility] in [place]  Patient PCP is Germania Suazo  Patient has a living will and no POA  CM will continue to follow  CM reviewed d/c planning process including the following: identifying help at home, patient preference for d/c planning needs, Discharge Lounge, [facility] Meds to Bed program, availability of treatment team to discuss questions or concerns patient and/or family may have regarding understanding medications and recognizing signs and symptoms once discharged  CM also encouraged patient to follow up with all recommended appointments after discharge  Patient advised of importance for patient and family to participate in managing patients medical well being

## 2021-07-15 NOTE — ASSESSMENT & PLAN NOTE
· On MS Contin ER 30 mg b i d and oxycodone 20mg QID prn  Verified on PDMP  · Add p r n   IV Dilaudid  · Add Bowel regimen

## 2021-07-15 NOTE — ASSESSMENT & PLAN NOTE
· Likely in the setting of lung cancer however symptoms did improve with diuretics  · Cardiology dosing furosemide  · Pulmonology started prednisone with azithromycin

## 2021-07-15 NOTE — PLAN OF CARE
Problem: Potential for Falls  Goal: Patient will remain free of falls  Description: INTERVENTIONS:  - Educate patient/family on patient safety including physical limitations  - Instruct patient to call for assistance with activity   - Consult OT/PT to assist with strengthening/mobility   - Keep Call bell within reach  - Keep bed low and locked with side rails adjusted as appropriate  - Keep care items and personal belongings within reach  - Initiate and maintain comfort rounds  - Make Fall Risk Sign visible to staff  - Offer Toileting every 2 Hours, in advance of need  - Initiate/Maintain bed alarm  - Obtain necessary fall risk management equipment: yellow sock  - Apply yellow socks and bracelet for high fall risk patients  - Consider moving patient to room near nurses station  Outcome: Progressing     Problem: PAIN - ADULT  Goal: Verbalizes/displays adequate comfort level or baseline comfort level  Description: Interventions:  - Encourage patient to monitor pain and request assistance  - Assess pain using appropriate pain scale  - Administer analgesics based on type and severity of pain and evaluate response  - Implement non-pharmacological measures as appropriate and evaluate response  - Consider cultural and social influences on pain and pain management  - Notify physician/advanced practitioner if interventions unsuccessful or patient reports new pain  Outcome: Progressing

## 2021-07-15 NOTE — ASSESSMENT & PLAN NOTE
· Replete and recheck in a m      Results from last 7 days   Lab Units 07/15/21  0344 07/14/21 2028   POTASSIUM mmol/L 3 5 2 8*

## 2021-07-16 ENCOUNTER — APPOINTMENT (INPATIENT)
Dept: NON INVASIVE DIAGNOSTICS | Facility: HOSPITAL | Age: 86
DRG: 180 | End: 2021-07-16
Payer: COMMERCIAL

## 2021-07-16 ENCOUNTER — APPOINTMENT (INPATIENT)
Dept: RADIOLOGY | Facility: HOSPITAL | Age: 86
DRG: 180 | End: 2021-07-16
Payer: COMMERCIAL

## 2021-07-16 LAB
ALBUMIN SERPL BCP-MCNC: 2.5 G/DL (ref 3.5–5)
ALP SERPL-CCNC: 88 U/L (ref 46–116)
ALT SERPL W P-5'-P-CCNC: 49 U/L (ref 12–78)
ANION GAP SERPL CALCULATED.3IONS-SCNC: 5 MMOL/L (ref 4–13)
AST SERPL W P-5'-P-CCNC: 20 U/L (ref 5–45)
BILIRUB SERPL-MCNC: 0.35 MG/DL (ref 0.2–1)
BUN SERPL-MCNC: 67 MG/DL (ref 5–25)
CALCIUM ALBUM COR SERPL-MCNC: 9.9 MG/DL (ref 8.3–10.1)
CALCIUM SERPL-MCNC: 8.7 MG/DL (ref 8.3–10.1)
CHLORIDE SERPL-SCNC: 98 MMOL/L (ref 100–108)
CO2 SERPL-SCNC: 35 MMOL/L (ref 21–32)
CREAT SERPL-MCNC: 1.63 MG/DL (ref 0.6–1.3)
ERYTHROCYTE [DISTWIDTH] IN BLOOD BY AUTOMATED COUNT: 13.2 % (ref 11.6–15.1)
GFR SERPL CREATININE-BSD FRML MDRD: 38 ML/MIN/1.73SQ M
GLUCOSE SERPL-MCNC: 107 MG/DL (ref 65–140)
HCT VFR BLD AUTO: 37.8 % (ref 36.5–49.3)
HGB BLD-MCNC: 12.4 G/DL (ref 12–17)
MCH RBC QN AUTO: 29.9 PG (ref 26.8–34.3)
MCHC RBC AUTO-ENTMCNC: 32.8 G/DL (ref 31.4–37.4)
MCV RBC AUTO: 91 FL (ref 82–98)
PLATELET # BLD AUTO: 212 THOUSANDS/UL (ref 149–390)
PMV BLD AUTO: 11.7 FL (ref 8.9–12.7)
POTASSIUM SERPL-SCNC: 2.6 MMOL/L (ref 3.5–5.3)
PROCALCITONIN SERPL-MCNC: <0.05 NG/ML
PROT SERPL-MCNC: 6.9 G/DL (ref 6.4–8.2)
RBC # BLD AUTO: 4.15 MILLION/UL (ref 3.88–5.62)
SODIUM SERPL-SCNC: 138 MMOL/L (ref 136–145)
WBC # BLD AUTO: 7.15 THOUSAND/UL (ref 4.31–10.16)

## 2021-07-16 PROCEDURE — 85027 COMPLETE CBC AUTOMATED: CPT | Performed by: INTERNAL MEDICINE

## 2021-07-16 PROCEDURE — 92611 MOTION FLUOROSCOPY/SWALLOW: CPT

## 2021-07-16 PROCEDURE — C8929 TTE W OR WO FOL WCON,DOPPLER: HCPCS

## 2021-07-16 PROCEDURE — 99232 SBSQ HOSP IP/OBS MODERATE 35: CPT | Performed by: INTERNAL MEDICINE

## 2021-07-16 PROCEDURE — 80053 COMPREHEN METABOLIC PANEL: CPT | Performed by: INTERNAL MEDICINE

## 2021-07-16 PROCEDURE — 84145 PROCALCITONIN (PCT): CPT | Performed by: STUDENT IN AN ORGANIZED HEALTH CARE EDUCATION/TRAINING PROGRAM

## 2021-07-16 PROCEDURE — 74230 X-RAY XM SWLNG FUNCJ C+: CPT

## 2021-07-16 PROCEDURE — 93306 TTE W/DOPPLER COMPLETE: CPT | Performed by: INTERNAL MEDICINE

## 2021-07-16 PROCEDURE — 0WJQ8ZZ INSPECTION OF RESPIRATORY TRACT, VIA NATURAL OR ARTIFICIAL OPENING ENDOSCOPIC APPROACH: ICD-10-PCS | Performed by: OTOLARYNGOLOGY

## 2021-07-16 PROCEDURE — 94640 AIRWAY INHALATION TREATMENT: CPT

## 2021-07-16 RX ORDER — POTASSIUM CHLORIDE 20 MEQ/1
40 TABLET, EXTENDED RELEASE ORAL EVERY 4 HOURS
Status: COMPLETED | OUTPATIENT
Start: 2021-07-16 | End: 2021-07-16

## 2021-07-16 RX ORDER — TORSEMIDE 20 MG/1
20 TABLET ORAL 2 TIMES DAILY
Status: DISCONTINUED | OUTPATIENT
Start: 2021-07-16 | End: 2021-07-17

## 2021-07-16 RX ADMIN — BUDESONIDE 0.5 MG: 0.5 INHALANT ORAL at 19:43

## 2021-07-16 RX ADMIN — AMLODIPINE BESYLATE 10 MG: 10 TABLET ORAL at 09:38

## 2021-07-16 RX ADMIN — CARVEDILOL 6.25 MG: 6.25 TABLET, FILM COATED ORAL at 16:56

## 2021-07-16 RX ADMIN — POLYETHYLENE GLYCOL 3350 17 G: 17 POWDER, FOR SOLUTION ORAL at 09:38

## 2021-07-16 RX ADMIN — OXYCODONE HYDROCHLORIDE 20 MG: 10 TABLET ORAL at 09:35

## 2021-07-16 RX ADMIN — POTASSIUM CHLORIDE 40 MEQ: 1500 TABLET, EXTENDED RELEASE ORAL at 09:38

## 2021-07-16 RX ADMIN — AZITHROMYCIN MONOHYDRATE 500 MG: 250 TABLET ORAL at 15:24

## 2021-07-16 RX ADMIN — IPRATROPIUM BROMIDE AND ALBUTEROL SULFATE 3 ML: 2.5; .5 SOLUTION RESPIRATORY (INHALATION) at 13:21

## 2021-07-16 RX ADMIN — POTASSIUM CHLORIDE 40 MEQ: 1500 TABLET, EXTENDED RELEASE ORAL at 15:25

## 2021-07-16 RX ADMIN — PREDNISONE 40 MG: 20 TABLET ORAL at 09:38

## 2021-07-16 RX ADMIN — MORPHINE SULFATE 30 MG: 30 TABLET, EXTENDED RELEASE ORAL at 21:29

## 2021-07-16 RX ADMIN — MORPHINE SULFATE 30 MG: 30 TABLET, EXTENDED RELEASE ORAL at 09:36

## 2021-07-16 RX ADMIN — OXYCODONE HYDROCHLORIDE 20 MG: 10 TABLET ORAL at 21:29

## 2021-07-16 RX ADMIN — HYDROMORPHONE HYDROCHLORIDE 0.2 MG: 0.2 INJECTION, SOLUTION INTRAMUSCULAR; INTRAVENOUS; SUBCUTANEOUS at 07:45

## 2021-07-16 RX ADMIN — BUDESONIDE 0.5 MG: 0.5 INHALANT ORAL at 07:45

## 2021-07-16 RX ADMIN — PERFLUTREN 0.4 ML/MIN: 6.52 INJECTION, SUSPENSION INTRAVENOUS at 11:40

## 2021-07-16 RX ADMIN — PRIMIDONE 50 MG: 50 TABLET ORAL at 21:33

## 2021-07-16 RX ADMIN — ENOXAPARIN SODIUM 30 MG: 30 INJECTION, SOLUTION INTRAVENOUS; SUBCUTANEOUS at 09:39

## 2021-07-16 RX ADMIN — IPRATROPIUM BROMIDE AND ALBUTEROL SULFATE 3 ML: 2.5; .5 SOLUTION RESPIRATORY (INHALATION) at 07:45

## 2021-07-16 RX ADMIN — IPRATROPIUM BROMIDE AND ALBUTEROL SULFATE 3 ML: 2.5; .5 SOLUTION RESPIRATORY (INHALATION) at 19:43

## 2021-07-16 RX ADMIN — LEVOTHYROXINE SODIUM 75 MCG: 75 TABLET ORAL at 05:39

## 2021-07-16 RX ADMIN — PRIMIDONE 50 MG: 50 TABLET ORAL at 10:00

## 2021-07-16 RX ADMIN — TORSEMIDE 20 MG: 20 TABLET ORAL at 15:25

## 2021-07-16 RX ADMIN — CARVEDILOL 6.25 MG: 6.25 TABLET, FILM COATED ORAL at 09:38

## 2021-07-16 RX ADMIN — ASPIRIN 81 MG: 81 TABLET, CHEWABLE ORAL at 09:38

## 2021-07-16 NOTE — CONSULTS
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 - on palliative therapy  2  Dysphagia  3  Dysphonia    Plan:  In regards to the dysphagia, recommendations given by speech therapy should be followed strictly  There is no evidence of a laryngeal mass causing his issues  In regards to the dysphonia, the findings indicate that there is no evidence of vocal cord pathology  Supraglottis and subglottis are both normal   I am unsure as to why he had worsening of his voice  It is possible that he had a temporary paresis of the vocal cord but this is no longer visible  At this point I have no concerns and no follow-up is required  Once again he should follow with all speech therapy recommendations which were given  History of Present Illness   Physician Requesting Consult: Windy Pain, DO  Reason for Consult / Principal Problem: dysphagia and dysphonia  HPI: Hugh Olvera is a 82y o  year old male who presented to the ER on 7/14/21 with a history of 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, left acetabulum, right sacrum and brain  He is currently on palliative care with chemo and XRT  He presented with shortness of breath and hoarseness  He also has a history of suspected COPD/asthma and has been seen by the pulmonary department during his current admission  He was started on oral Prednisone and Azithromycin  Earlier on the day of admission he admits to choking on a pill  During admission he had a video swallow study (7/15/21) and was diagnosed with mid pharyngeal dysphagia  He had a more formal video barium swallow today and this demonstrated a moderate oral pharyngeal dysphagia  There was prolonged mastication time with inefficient transfer of regular solids    There was spillage into the piriform and over the base of tongue with some piriform retention noted  There was also transient penetration with all liquids  No aspiration was observed  Recommendations were made by speech therapy for dietary intake  It was noted that he has had a high risk of aspiration due to his current medical status  Due to the hoarseness and dysphagia I was called for further evaluation  He states that when he was admitted the voice was quite hoarse however, over the past 2 days he has noticed significant improvement of his voice to baseline status  Inpatient consult to ENT  Consult performed by: Micaela Alfonso DO  Consult ordered by: Yaritza Bobo DO          Review of Systems   Constitutional: Negative for activity change, appetite change, fatigue and unexpected weight change  HENT: Positive for trouble swallowing and voice change (hoarseness)  Negative for congestion, ear discharge, ear pain, facial swelling, hearing loss, nosebleeds, postnasal drip, rhinorrhea, sinus pressure, sinus pain, sneezing, sore throat and tinnitus  Eyes: Negative for photophobia, pain, discharge, itching and visual disturbance  Respiratory: Positive for shortness of breath  Negative for cough and chest tightness  Musculoskeletal: Negative for gait problem, neck pain and neck stiffness  Skin: Negative for rash and wound  Allergic/Immunologic: Negative for environmental allergies  Neurological: Negative for dizziness, facial asymmetry and speech difficulty  Hematological: Negative for adenopathy  Psychiatric/Behavioral: Negative for sleep disturbance  The patient is not nervous/anxious          Historical Information   Past Medical History:   Diagnosis Date    Arthritis     Asthma     Chronic pain disorder     back    Colon polyp     Disease of thyroid gland     hypo    Dystonic tremor     Essential tremor     Hyperlipidemia     Hypertension     Pneumonia     x2    Stroke [facility])     unknown and no residual     Past Surgical History:   Procedure Laterality Date    BACK SURGERY      CATARACT EXTRACTION Left     COLONOSCOPY      JOINT REPLACEMENT Left     reverse shoulder    MOHS RECONSTRUCTION N/A 6/3/2020    Procedure: REPAIR MOHS OF THE NOSE;  Surgeon: Jd Raman MD;  Location: [address] [place] [address];  Service: Plastics    MN DELAY/SECTN FLAP LID,NOS,EAR,LIP N/A 7/15/2020    Procedure: DIVIDE NASAL FLAP;  Surgeon: Jd Raman MD;  Location: [address] [place] [address];  Service: [address] [place] [address] SURGERY      TONSILLECTOMY       Social History   Social History     Substance and Sexual Activity   Alcohol Use Yes    Comment: < 1 drink a month     Social History     Substance and Sexual Activity   Drug Use Never     E-Cigarette/Vaping    E-Cigarette Use Never User      E-Cigarette/Vaping Substances    Nicotine No     THC No     CBD No     Flavoring No     Other No     Unknown No      Social History     Tobacco Use   Smoking Status Former Smoker    Types: Cigarettes    Quit date: 1994    Years since quittin 2   Smokeless Tobacco Current User    Types: Chew   Tobacco Comment    currently uses chewing tobacco     Family History:   Family History   Problem Relation Age of Onset    No Known Problems Mother     No Known Problems Father        Meds/Allergies   current meds:   Current Facility-Administered Medications   Medication Dose Route Frequency    acetaminophen (TYLENOL) tablet 650 mg  650 mg Oral Q6H PRN    albuterol (PROVENTIL HFA,VENTOLIN HFA) inhaler 2 puff  2 puff Inhalation Q4H PRN    aluminum-magnesium hydroxide-simethicone (MYLANTA) oral suspension 30 mL  30 mL Oral Q6H PRN    amLODIPine (NORVASC) tablet 10 mg  10 mg Oral Daily    aspirin chewable tablet 81 mg  81 mg Oral Daily    azithromycin (ZITHROMAX) tablet 500 mg  500 mg Oral Q24H    bisacodyl (DULCOLAX) EC tablet 5 mg  5 mg Oral Daily PRN    budesonide (PULMICORT) inhalation solution 0 5 mg  0 5 mg Nebulization Q12H    carvedilol (COREG) tablet 6 25 mg  6 25 mg Oral BID With Meals    cloNIDine (CATAPRES) tablet 0 1 mg  0 1 mg Oral HS    enoxaparin (LOVENOX) subcutaneous injection 30 mg  30 mg Subcutaneous Daily    guaiFENesin (ROBITUSSIN) oral solution 200 mg  200 mg Oral Q4H PRN    HYDROmorphone HCl (DILAUDID) injection 0 2 mg  0 2 mg Intravenous Q4H PRN    ipratropium-albuterol (DUO-NEB) 0 5-2 5 mg/3 mL inhalation solution 3 mL  3 mL Nebulization TID    ipratropium-albuterol (DUO-NEB) 0 5-2 5 mg/3 mL inhalation solution 3 mL  3 mL Nebulization Q6H PRN    levothyroxine tablet 75 mcg  75 mcg Oral Early Morning    morphine (MS CONTIN) ER tablet 30 mg  30 mg Oral Q12H [address]    ondansetron (ZOFRAN) injection 4 mg  4 mg Intravenous Q6H PRN    oxyCODONE (ROXICODONE) immediate release tablet 20 mg  20 mg Oral BID    oxyCODONE (ROXICODONE) immediate release tablet 20 mg  20 mg Oral Q6H PRN    polyethylene glycol (MIRALAX) packet 17 g  17 g Oral Daily    predniSONE tablet 40 mg  40 mg Oral Daily    primidone (MYSOLINE) tablet 50 mg  50 mg Oral Q12H MICHAEL    simethicone (MYLICON) chewable tablet 80 mg  80 mg Oral 4x Daily PRN    and PTA meds:   Prior to Admission Medications   Prescriptions Last Dose Informant Patient Reported? Taking?    ASPIRIN 81 PO  Spouse/Significant Other Yes No   Sig: Take 81 mg by mouth daily    Ascorbic Acid (VITAMIN C) 1000 MG tablet  Spouse/Significant Other Yes No   Sig: Take 500 mg by mouth daily    Patient not taking: Reported on 7/2/2021   Biotin 5 MG CAPS  Spouse/Significant Other Yes No   Sig: Take 10 mg by mouth daily    Patient not taking: Reported on 7/2/2021   Cholecalciferol (VITAMIN D3 PO)  Spouse/Significant Other Yes No   Sig: Take 4,000 tablets by mouth daily    Patient not taking: Reported on 7/2/2021   Coenzyme Q10 (CO Q 10) 10 MG CAPS  Spouse/Significant Other Yes No   Sig: Take 200 mg by mouth daily    Patient not taking: Reported on 7/2/2021   FLOVENT DISKUS 250 MCG/BLIST AEPB Spouse/Significant Other Yes No   Si puff a day in the morning   Flaxseed, Linseed, (FLAXSEED OIL) 1000 MG CAPS  Spouse/Significant Other Yes No   Sig: Take 1 capsule by mouth daily    Kelp 150 MCG TABS  Spouse/Significant Other Yes No   Sig: Take by mouth daily   Patient not taking: Reported on 2021   Lecithin 1200 MG CAPS  Spouse/Significant Other Yes No   Sig: Take by mouth daily    Patient not taking: Reported on 2021   Multiple Vitamin (MULTI VITAMIN DAILY PO)  Spouse/Significant Other Yes No   Sig: Take 1 capsule by mouth daily    Patient not taking: Reported on 2021   Multiple Vitamins-Minerals (PRESERVISION AREDS PO)  Spouse/Significant Other Yes No   Sig: Take 1 tablet by mouth 2 (two) times a day    Patient not taking: Reported on 2021   Omega-3 Fatty Acids (FISH OIL PO)  Spouse/Significant Other Yes No   Sig: Take 4,800 mg by mouth    Patient not taking: Reported on 2021   Red Yeast Rice 600 MG CAPS  Spouse/Significant Other Yes No   Sig: Take 600 mg by mouth Pt takes 4 per day   Patient not taking: Reported on 2021   Vitamin E 400 units TABS  Spouse/Significant Other Yes No   Sig: Take 800 Units by mouth daily    Patient not taking: Reported on 2021   albuterol (PROVENTIL HFA,VENTOLIN HFA) 90 mcg/act inhaler  Spouse/Significant Other Yes No   amLODIPine (NORVASC) 10 mg tablet  Spouse/Significant Other Yes No   Sig: Take 10 mg by mouth daily    amoxicillin (AMOXIL) 500 mg capsule  Spouse/Significant Other Yes No   Patient not taking: Reported on 2021   carvedilol (COREG) 6 25 mg tablet  Spouse/Significant Other Yes No   Sig: Take 6 25 mg by mouth 2 (two) times a day with meals    cloNIDine (CATAPRES) 0 1 mg tablet 2021 at Unknown time Spouse/Significant Other Yes Yes   Sig: Take 0 1 mg by mouth daily at bedtime    furosemide (LASIX) 20 mg tablet  Spouse/Significant Other Yes No   Sig: Take 20 mg by mouth daily    levothyroxine 75 mcg tablet  Spouse/Significant Other Yes No   Sig: Take 75 mcg by mouth daily    morphine (MS CONTIN) 30 mg 12 hr tablet   Yes No   Sig: TAKE 1 TABLET (30 MG TOTAL) BY MOUTH 2 (TWO) TIMES A DAY  MAX DAILY AMOUNT  60 MG   morphine (MS CONTIN) 60 mg 12 hr tablet   Yes No   oxyCODONE (ROXICODONE) 5 mg immediate release tablet  Spouse/Significant Other Yes No   Sig: as needed    polyethylene glycol (MIRALAX) 17 g packet   Yes Yes   Sig: Take 17 g by mouth daily   predniSONE 20 mg tablet   Yes No   primidone (MYSOLINE) 50 mg tablet  Spouse/Significant Other No No   Sig: Take 1tab bid, may slowly increase to 2tabs bid   prochlorperazine (COMPAZINE) 10 mg tablet   Yes No   senna-docusate sodium (SENOKOT-S) 8 6-50 mg per tablet   Yes Yes   Sig: Take 1 tablet by mouth daily before dinner   simvastatin (ZOCOR) 10 mg tablet  Spouse/Significant Other Yes No   Sig: Take 10 mg by mouth daily at bedtime       Facility-Administered Medications: None       Allergies   Allergen Reactions    Lisinopril      Swelling of tongue    Sulfa Antibiotics Other (See Comments)     pancreatitis    Other reaction(s): Other (Please comment)  Pancreas destroy itself    Zestoretic [Lisinopril-Hydrochlorothiazide]      Pancreatitis       Objective       Intake/Output Summary (Last 24 hours) at 7/16/2021 0818  Last data filed at 7/16/2021 0417  Gross per 24 hour   Intake --   Output 870 ml   Net -870 ml       Invasive Devices     Peripheral Intravenous Line            Peripheral IV 07/15/21 Right Forearm 1 day                Physical Exam     PHYSICAL  EXAMINATION    CONSTITUTION:    appears appropriate for age  No evidence of any acute distress  Communicates normally  Voice quality is clear  Alert and oriented  HEAD/FACE:    atraumatic, normocephalic on inspection  No scars present  Salivary glands are normal in texture and size without any asymmetry      Facial nerve function is symmetric and normal     EYES:    Extraocular muscles intact in both eyes, normal gaze bilaterally and no evidence of nystagmus  Pupils equal, round, and accommodate to light bilaterally  EARS:    External ears normal     External canals are clear and dry  Tympanic membranes intact with normal mobility, no effusion, no retraction, no perforation  Post auricular area is normal    NOSE:    External nose with scar from prior surgery  Internal mucosa pink and moist     Septum deviated to the right with a posterior spur on the right side  Nasal turbinates normal in color and size  ORAL CAVITY:    lips normal and healthy in appearance  Dentition normal     Gums healthy, pink and moist     Tongue appears pink and moist with no lesions  Floor of mouth pink, moist, and smooth  Submandibular ducts patent with clear saliva  Parotid ducts patent with clear saliva  Oral mucosa pink and moist     Hard palate normal in appearance without any lesions  OROPHARYNX:    soft palate pink and moist without any lesions  Uvula midline without any lesions  Tonsils absent  Posterior pharynx pink and moist without any lesions    NECK:    supple and symmetric  No masses noted  Trachea midline  No thyromegaly or nodules noted  LYMPH:    No palpable adenopathy in left or right neck    SKIN:    No rashes  No lesions noted  Nasopharyngolaryngoscopy:    Verbal consent was obtained from the patient / parent  The patient was placed in the upright position in the examination chair  The scope was passed through the left nasal cavity into the posterior nasopharynx  Evaluation of the pharynx and larynx was carried out with the following findings:    Eustachian Tube Orifice:  Patent bilaterally without edema or obstruction  Nasopharynx:  Smooth mucosa without adenoid bed or mass  Oropharynx:  No masses or lesions present  Vallecula:  No abnormalities, pink moist mucosa  Tongue Base:  Normal without masses or asymmetry      Epiglottis:  Normal mucosa on vallecular and laryngeal surfaces  Pyriform Aperture:  Mucosa appears pink and moist without masses  Arytenoid Mucosa/Aryepiglottic Folds:  Normal mucosa without evidence of edema, hyperemia, mass or ulcer  False Vocal cords:  Normal mucosa, no evidence of mass, lesion or edema  True Vocal cords: White in color, no masses, nodules, polyps, edema, paresis or paralysis of either cord  Subglottic Space: The airway is patent and there are no lesions  After careful evaluation of the above structures, the scope was removed from the nasal cavity  The patient tolerated the procedure well  Lab Results:   CBC:   Lab Results   Component Value Date    WBC 7 15 07/16/2021    HGB 12 4 07/16/2021    HCT 37 8 07/16/2021    MCV 91 07/16/2021     07/16/2021    MCH 29 9 07/16/2021    MCHC 32 8 07/16/2021    RDW 13 2 07/16/2021    MPV 11 7 07/16/2021   , CMP:   Lab Results   Component Value Date    SODIUM 138 07/16/2021    K 2 6 (LL) 07/16/2021    CL 98 (L) 07/16/2021    CO2 35 (H) 07/16/2021    BUN 67 (H) 07/16/2021    CREATININE 1 63 (H) 07/16/2021    CALCIUM 8 7 07/16/2021    AST 20 07/16/2021    ALT 49 07/16/2021    ALKPHOS 88 07/16/2021    EGFR 38 07/16/2021   , Coags: No results found for: PT, PTT, INR  Imaging Studies: I have personally reviewed pertinent reports  Procedure: CT soft tissue neck with contrast    Result Date: 7/14/2021  Narrative: CT NECK WITH CONTRAST INDICATION:   Shortness of breath, hoarseness and lung cancer  COMPARISON:  5/13/2019 and 7/14/2021  TECHNIQUE:  Axial, sagittal, and coronal 2D reformatted images were created from the axial source data and submitted for interpretation  Radiation dose length product (DLP) for this visit:  778 mGy-cm   This examination, like all CT scans performed in the [facility], was performed utilizing techniques to minimize radiation dose exposure, including the use of iterative reconstruction and automated exposure control  IV Contrast:  100 mL of iohexol (OMNIPAQUE) IMAGE QUALITY:  Diagnostic  FINDINGS: VISUALIZED BRAIN PARENCHYMA:  Normal enhancement  VISUALIZED ORBITS AND PARANASAL SINUSES:  Partially visualized globes and orbits and paranasal sinuses, unremarkable  NASAL CAVITY AND NASOPHARYNX:  No nasopharyngeal inflammation  Patent nasal cavity  SUPRAHYOID NECK:  Intact oral cavity  No parapharyngeal retropharyngeal inflammation  INFRAHYOID NECK:  Epiglottis, aryepiglottic folds and piriform sinuses are normal   Normal glottis and subglottic airway  THYROID GLAND:  Unremarkable  PAROTID AND SUBMANDIBULAR GLANDS:  No sialoadenitis  LYMPH NODES:  No lymphadenopathy in the neck  VASCULAR STRUCTURES:  Calcified plaque at the right ICA origin resulting in moderate (50-69%) stenosis  THORACIC INLET:  Right upper lobe spiculated 5 x 7 x 5 cm mass extending to the hilum and encasing the  upper lobe proximal segmental bronchi  Pulmonary nodules measuring 3 to 4 mm in the apical region of the right upper lobe  High right paratracheal low-attenuation 1 5 cm lesion may represent a necrotic lymph node BONY STRUCTURES: Lytic or blastic lesion  Disc and facet osteophytosis in the cervical spine resulting in mild to moderate central canal stenosis and moderate to severe neural foraminal narrowing  Impression: 1  No evidence of mass or lymphadenopathy in the neck  2   Right upper lobe and hilar mass consistent with primary lung malignancy, further evaluated on the accompanying chest CT report  Workstation performed: [id number]     Procedure: CT chest with contrast    Result Date: 7/14/2021  Narrative: CT CHEST WITH IV CONTRAST INDICATION:   Hoarseness and shortness of breath  COMPARISON:  11/17/2006  TECHNIQUE: CT examination of the chest was performed  Axial, sagittal, and coronal 2D reformatted images were created from the source data and submitted for interpretation  Radiation dose length product (DLP) for this visit:  756 mGy-cm   This examination, like all CT scans performed in the [facility], was performed utilizing techniques to minimize radiation dose exposure, including the use of iterative reconstruction and automated exposure control  IV Contrast:  100 mL of iohexol (OMNIPAQUE) FINDINGS: LUNGS:  Spiculated right upper lobe mass extending to the major minor fissures, right paratracheal region suprahilar region measuring 8 2 x 5 1 x 7 6 cm  Mass extends to the pleura laterally  Satellite pulmonary nodules in the apical region of the right upper lobe measuring 2 to 3 mm and adjacent scarring  Calcified granuloma in the left lower lobe  Encasement of the right mainstem bronchus and proximal upper lobe segmental bronchi  Encasement of the origins of the right middle and lower lobe segmental bronchi  Narrowing of segmental and subsegmental bronchi without occlusion or endobronchial lesion  PLEURA:  No effusion  HEART/GREAT VESSELS:  Normal heart size  No thoracic aortic aneurysm  MEDIASTINUM AND GAEL:  Low-attenuation high right paratracheal 1 6 cm lymph node  Right upper lobe mass extends into the right hilum and aortopulmonary window, possibly representing confluent lymphadenopathy  Subcarinal 2 cm lymph node  CHEST WALL AND LOWER NECK:   Unremarkable  VISUALIZED STRUCTURES IN THE UPPER ABDOMEN:  Unremarkable  OSSEOUS STRUCTURES:  No acute fracture or destructive osseous lesion  Impression: 1  Right upper lobe, hilar and paratracheal mass measuring 8 2 x 5 1 x 7 6 cm consistent with primary lung malignancy  Encasement of the right mainstem bronchus and proximal segmental bronchi without evidence of endobronchial mass or occlusion  2   Satellite 2 to 3 mm pulmonary nodules in the apical region of the right upper lobe  3   High right paratracheal 1 6 cm lymph node, possibly necrotic  Subcarinal 2 cm lymph node   Workstation performed: [id number]     EKG, Pathology, and Other Studies: I have personally reviewed pertinent films in PACS    Code Status: Level 1 - Full Code  Advance Directive and Living Will:      Power of :    POLST:      None

## 2021-07-16 NOTE — DISCHARGE INSTR - OTHER ORDERS
Skin care plans:  1-Hydraguard to bilateral  heels BID  PRN  2-Elevate heels to offload pressure  3-Ehob cushion in chair when out of bed  4-Moisturize skin daily with skin nourishing cream   5-Turn/reposition q2h or when medically stable for pressure re-distribution on skin       Wound care plan   1  Mid sacral / inner buttocks area - cleanse with  Normal saline prep jessica wound with 3M Cavilon no sting to the jessica wound area then apply xeroform to the wound bed then top with a Allevyn foam salazar with a T and date change every other day or if soiled   2   P - 500 low air loss mattress

## 2021-07-16 NOTE — ASSESSMENT & PLAN NOTE
· Secondary to diuretic administration  · Replete with 80 mEq today and recheck in a m      Results from last 7 days   Lab Units 07/16/21  0454 07/15/21  0344 07/14/21 2028   POTASSIUM mmol/L 2 6* 3 5 2 8*

## 2021-07-16 NOTE — ASSESSMENT & PLAN NOTE
· Likely in the setting of lung cancer however symptoms did improve with diuretics  · Was on IV furosemide but has been transitioned to torsemide by cardiology today  Follow-up on echocardiogram today      · Pulmonology started prednisone with azithromycin for COPD exacerbation

## 2021-07-16 NOTE — PROGRESS NOTES
Cardiology Progress Note   Edd Jericho, MD Armstead Hoar, MD Elissa Pearl, DO, Dovie Primrose Mansoor, MD Marinus Helper, DO, Petrina Morita, DO, [facility] [place]  ----------------------------------------------------------------  [address] [facility]  [address] [facility],  [address] 82 y o  male MRN: [id number]  Unit/Bed#: E5 -01 Encounter: [id number]      ASSESSMENT:   · Shortness of breath/dyspnea on exertion  · Lower extremity swelling  · Non-small cell lung cancer, stage IV plan for palliative chemotherapy  · Hypertension  · Dyslipidemia  · Hypothyroid  · Venous insufficiency  · History of mild carotid stenosis bilaterally, 2019  · Pharmacologic nuclear stress test negative for myocardial ischemia, gated EF 72%, August 2019    PLAN:  Patient clinically appears improved and euvolemic  Leg swelling has improved on exam  His hypoalbuminemia, chronic venous insufficiency and probably increased right-sided pressures from lung mass are likely the reason for his residual lower extremity swelling  Echocardiographic findings demonstrate mild RV dilatation in RA dilatation consistent with elevated right-sided pressures despite pulmonary pressures only being 25-30 by TR measurements  Replete potassium today; keep potassium greater than 4 magnesium greater than 2  Patient was on Lasix 40 mg b i d  as outpatient; will transition to torsemide 20 mg b i d   For improved bioavailability  Would recommend BMP in 1 week after discharge  Will likely require supplementation for potassium  Palliative treatment for non-small cell lung cancer as per Hematology/Oncology  Continue clonidine, carvedilol, amlodipine, aspirin  As patient is feeling back to baseline, reasonable for discharge from CV perspective with outpatient follow-up with Cardiology, Pulmonary and Oncology  Cardiology follow-up within 2 weeks of discharge for additional CV testing as clinically indicated  Should he gain greater than 3 lb in a day or 5 lb overall, recommend call the office for further titration of diuretic medication  Will see further inpatient p r n  Signed: Rafa Duke DO, [facility] [place], [facility]      History of Present Illness:  Patient seen and examined  Denies chest pain, pressure, tightness or squeezing  Denies lightheadedness, dizziness or patient's  Denies orthopnea or paroxysmal nocturnal dyspnea    Review of Systems:  Review of Systems   Constitutional: Negative for decreased appetite, fever, weight gain and weight loss  HENT: Negative for congestion and sore throat  Eyes: Negative for visual disturbance  Cardiovascular: Positive for leg swelling  Negative for chest pain, dyspnea on exertion, near-syncope and palpitations  Respiratory: Negative for cough and shortness of breath  Hematologic/Lymphatic: Negative for bleeding problem  Skin: Negative for rash  Musculoskeletal: Negative for myalgias and neck pain  Gastrointestinal: Negative for abdominal pain and nausea  Neurological: Negative for light-headedness and weakness  Psychiatric/Behavioral: Negative for depression  Allergies   Allergen Reactions    Lisinopril      Swelling of tongue    Sulfa Antibiotics Other (See Comments)     pancreatitis    Other reaction(s): Other (Please comment)  Pancreas destroy itself    Zestoretic [Lisinopril-Hydrochlorothiazide]      Pancreatitis       No current facility-administered medications on file prior to encounter       Current Outpatient Medications on File Prior to Encounter   Medication Sig    cloNIDine (CATAPRES) 0 1 mg tablet Take 0 1 mg by mouth daily at bedtime     polyethylene glycol (MIRALAX) 17 g packet Take 17 g by mouth daily    senna-docusate sodium (SENOKOT-S) 8 6-50 mg per tablet Take 1 tablet by mouth daily before dinner    albuterol (PROVENTIL HFA,VENTOLIN HFA) 90 mcg/act inhaler     amLODIPine (NORVASC) 10 mg tablet Take 10 mg by mouth daily     amoxicillin (AMOXIL) 500 mg capsule (Patient not taking: Reported on 7/2/2021)    Ascorbic Acid (VITAMIN C) 1000 MG tablet Take 500 mg by mouth daily  (Patient not taking: Reported on 7/2/2021)    ASPIRIN 81 PO Take 81 mg by mouth daily     Biotin 5 MG CAPS Take 10 mg by mouth daily  (Patient not taking: Reported on 7/2/2021)    carvedilol (COREG) 6 25 mg tablet Take 6 25 mg by mouth 2 (two) times a day with meals     Cholecalciferol (VITAMIN D3 PO) Take 4,000 tablets by mouth daily  (Patient not taking: Reported on 7/2/2021)    Coenzyme Q10 (CO Q 10) 10 MG CAPS Take 200 mg by mouth daily  (Patient not taking: Reported on 7/2/2021)    Flaxseed, Linseed, (FLAXSEED OIL) 1000 MG CAPS Take 1 capsule by mouth daily     FLOVENT DISKUS 250 MCG/BLIST AEPB 1 puff a day in the morning    furosemide (LASIX) 20 mg tablet Take 20 mg by mouth daily     Kelp 150 MCG TABS Take by mouth daily (Patient not taking: Reported on 7/2/2021)    Lecithin 1200 MG CAPS Take by mouth daily  (Patient not taking: Reported on 7/2/2021)    levothyroxine 75 mcg tablet Take 75 mcg by mouth daily     morphine (MS CONTIN) 30 mg 12 hr tablet TAKE 1 TABLET (30 MG TOTAL) BY MOUTH 2 (TWO) TIMES A DAY   MAX DAILY AMOUNT  60 MG    morphine (MS CONTIN) 60 mg 12 hr tablet     Multiple Vitamin (MULTI VITAMIN DAILY PO) Take 1 capsule by mouth daily  (Patient not taking: Reported on 7/2/2021)    Multiple Vitamins-Minerals (PRESERVISION AREDS PO) Take 1 tablet by mouth 2 (two) times a day  (Patient not taking: Reported on 7/2/2021)    Omega-3 Fatty Acids (FISH OIL PO) Take 4,800 mg by mouth  (Patient not taking: Reported on 7/2/2021)    oxyCODONE (ROXICODONE) 5 mg immediate release tablet as needed     predniSONE 20 mg tablet     primidone (MYSOLINE) 50 mg tablet Take 1tab bid, may slowly increase to 2tabs bid    prochlorperazine (COMPAZINE) 10 mg tablet     Red Yeast Rice 600 MG CAPS Take 600 mg by mouth Pt takes 4 per day (Patient not taking: Reported on 7/2/2021)    simvastatin (ZOCOR) 10 mg tablet Take 10 mg by mouth daily at bedtime     Vitamin E 400 units TABS Take 800 Units by mouth daily  (Patient not taking: Reported on 7/2/2021)        Current Facility-Administered Medications   Medication Dose Route Frequency Provider Last Rate    acetaminophen  650 mg Oral Q6H PRN Danis Belmont, CRNP      albuterol  2 puff Inhalation Q4H PRN Danis Belmont, CRNP      aluminum-magnesium hydroxide-simethicone  30 mL Oral Q6H PRN Danis Belmont, CRNP      amLODIPine  10 mg Oral Daily Danis Belmont, CRNP      aspirin  81 mg Oral Daily Danis Belmont, CRNP      azithromycin  500 mg Oral Q24H Africa Bucco, CRNP      bisacodyl  5 mg Oral Daily PRN Danis Belmont, CRNP      budesonide  0 5 mg Nebulization Q12H [facility], CRNP      carvedilol  6 25 mg Oral BID With Meals Danis Belmont, CRNP      cloNIDine  0 1 mg Oral HS Danis Belmont, CRNP      enoxaparin  30 mg Subcutaneous Daily Danis Belmont, CRNP      guaiFENesin  200 mg Oral Q4H PRN Danis Belmont, CRNP      HYDROmorphone  0 2 mg Intravenous Q4H PRN Danis Belmont, CRNP      ipratropium-albuterol  3 mL Nebulization TID Sharmila Lo, DO      ipratropium-albuterol  3 mL Nebulization Q6H PRN Sharmila Lo, DO      levothyroxine  75 mcg Oral Early Morning Danis Belmont, CRNP      morphine  30 mg Oral Q12H [facility] Danis Belmont, CRNP      ondansetron  4 mg Intravenous Q6H PRN Danis Belmont, CRNP      oxyCODONE  20 mg Oral BID Danis Belmont, CRNP      oxyCODONE  20 mg Oral Q6H PRN Danis Belmont, CRNP      polyethylene glycol  17 g Oral Daily Danis Belmont, CRNP      predniSONE  40 mg Oral Daily [facility], CRNP      primidone  50 mg Oral Q12H [facility] Danis Belmont, CRNP      simethicone  80 mg Oral 4x Daily PRN Danis Belmont, CRNP      torsemide  20 mg Oral BID Anthony Perry, DO              Vitals:    07/16/21 9885 07/16/21 0930 07/16/21 1320 07/16/21 1457   BP: 142/60   113/83   BP Location:    Left arm   Pulse: 93   66   Resp:    18   Temp:    98 5 °F (36 9 °C)   TempSrc:    Oral   SpO2: (!) 86% 91% 91% 90%   Weight:             Intake/Output Summary (Last 24 hours) at 7/16/2021 1722  Last data filed at 7/16/2021 0417  Gross per 24 hour   Intake --   Output 570 ml   Net -570 ml       Weight change: -3 9 kg (-8 lb 9 6 oz)    PHYSICAL EXAMINATION:  Gen: Awake, Alert, NAD  Head/eyes: AT/NC, pupils equal and round, Anicteric  ENT: mmm  Neck: Supple, No elevated JVP, trachea midline  Resp:   decreased breath sounds otherwise clear bilaterally  CV: RRR +S1, S2, No m/r/g  Abd: Soft, NT/ND + BS  Ext: +1 LLE trace RLE edema  Neuro: Follows commands, moves all extermities  Psych: Appropriate affect, normal mood, pleasant attitude, non-combative  Skin: warm; no rash, erythema or venous stasis changes on exposed skin    Lab Results:  Results from last 7 days   Lab Units 07/16/21  0454   WBC Thousand/uL 7 15   HEMOGLOBIN g/dL 12 4   HEMATOCRIT % 37 8   PLATELETS Thousands/uL 212     Results from last 7 days   Lab Units 07/16/21  0454   POTASSIUM mmol/L 2 6*   CHLORIDE mmol/L 98*   CO2 mmol/L 35*   BUN mg/dL 67*   CREATININE mg/dL 1 63*   CALCIUM mg/dL 8 7   ALK PHOS U/L 88   ALT U/L 49   AST U/L 20     No results found for: TROPONINT      Results from last 7 days   Lab Units 07/14/21 2028   TROPONIN I ng/mL <0 02     Results from last 7 days   Lab Units 07/14/21 2028   INR  1 36*       This note was completed in part utilizing [email or web address] Fluency Direct Software  Grammatical errors, random word insertions, spelling mistakes, and incomplete sentences may be an occasional consequence of this system secondary to software limitations, ambient noise, and hardware issues    If you have any questions or concerns about the content, text, or information contained within the body of this dictation, please contact the provider for clarification

## 2021-07-16 NOTE — PROCEDURES
Video Barium Swallow Study    Summary:  Images are on PACS for review  The patient presents with a moderate oropharyngeal dysphagia  Mastication time is prolonged, and inefficient with regular solids  Patient was unable to fully breakdown cookie and spit out bolus  Patient also unable to transfer barium tablet  With other items, a-p transfer time was min prolonged  Min decreased oral control observed, with spillage over BOT to the valleculae with all solids and honey thick liquids  Spillage to the pyriforms is observed x1 with mixed consistency cereal  A moderate amount of valleculae retention is observed with all consistencies throughout; a minimal amount of pyriform retention is seen  Double swallows is only somewhat helpful at clearing retention  The patient has transient penetration with all liquids and questionable episode to the cords with thin liquids x1  No aspiration events observed  However, patient is at risk due to retained material  Brief scan of the esophagus is unremarkable  Recommendations:  Diet: Mechanical soft   Liquids: Thin liquids via cup  Meds: Whole or crushed in puree   Strategies: Double swallows, moisten dry foods   Upright position  F/u ST tx: yes  Therapy Prognosis: fair  Prognosis considerations: high risk of aspiration due to medical status   Intermittent Supervision  Aspiration Precautions  Results reviewed with: pt  Aspiration precautions posted  Patient given written handouts re: diet prep and suggestions at home     If a dedicated assessment of the esophagus is desired, consider esophagram/barium swallow or EGD  Patient's goal:  "I think the softer food is best too"     Goals:  Pt will tolerate least restrictive diet w/out s/s aspiration or oral/pharyngeal difficulties  Current Medical Status  Yvonne Mendoza a 82 y  o  male who presents with c/o shortness of breath and dysphagia   Patient reports shortness of breath on and off for 6-8 weeks   Reports chest tightness and congestion, cough productive of yellow blood-tinged sputum and difficulty expectorating mucus   Shortness of breath not worse with activity, denies orthopnea, states weight fluctuates between 1 to 2 lbs, states he loses 1-2lbs and gains 1-2lbs   Compliant with furosemide 20mg b i d  Followed outpatient by Dr Durwin Kanner, recommendation made to increase Lasix to 40 mg b i d  As per cardiology telephone conversation with spouse on 7/14, she reported he was significantly short of breath with edema, was recommended echo, BNP and evaluation in ED   Reports dysphagia although states he is able to swallow small pills   Denies fevers      Previous VBS:  None   Current Diet:  Mechanical soft and thin liquids    Premorbid diet:  Regular with thin liquids   Dentition:  Adequate   O2 requirement:  None   Oral mech:  Strength and ROM: wfl  Vocal Quality/Speech:  Hoarse voice  ENT evaluated patient   Cognitive status:  Appears adequate     Consistencies administered: Barium laden applesauce, cheerios/nectar, hard solid, honey thick, nectar thick, thin liquids, 13mm barium pill  Liquids were administered by cup and straw  Pt was seated laterally at 90 degrees  Oral stage: Moderate  Lip closure: wfl  Mastication: prolonged with all; inefficient with regular   Bolus formation: poor for regular solids   Bolus control: wfl  Transfer: prolonged with all   Residue: with regular solids     Pharyngeal stage:   Moderate  Swallow promptness: delayed with all solids and honey thick liquids   Spill to valleculae: with all solids and honey thick liquids   Spill to pyriforms: x1 with mixed consistency cereal   Epiglottic inversion: wfl  Laryngeal excursion: reduced   Pharyngeal constriction: reduced  Vallecular retention: moderate  Pyriform retention: minimal   PPW coating: at times   Osteophytes: no  CP prominence: no  Retropulsion from prominence: n/a  Transient penetration: with all liquids   Epiglottic undercoat: intermittently throughout   Penetration: with thin liquids (question to the cords x1)  Aspiration: no  Strategies: double swallows only clears small amount of rentention   Response to aspiration: n/a    Screening of Esophageal stage:   WNL

## 2021-07-16 NOTE — ASSESSMENT & PLAN NOTE
· BIJAN secondary to volume overload  · Monitor with administration of diuretic      Results from last 7 days   Lab Units 07/16/21  0454 07/15/21  0344 07/14/21 2028   BUN mg/dL 67* 63* 70*   CREATININE mg/dL 1 63* 1 56* 1 79*   EGFR ml/min/1 73sq m 38 40 34

## 2021-07-16 NOTE — WOUND OSTOMY CARE
Consult Note - Wound   Dolores Bunn 82 y o  male MRN: [id number]  Unit/Bed#: E5 -01 Encounter: [id number]      History and Present Illness: Patient is seen for wound care consult today   He is a 82year old male that is admitted with acute respiratory insufficiency   He has a right lung malignant neoplasm with cancer related pain in the ribs   He has severe protein malnutrition , BIJAN , hypokalemia , and dysphagia with hoarseness  He reports that due to the rib pain he has been less mobile at home and has been in a chair to sleep and sitting a lot   He reports that the area on the buttocks sacral probably happened 2-3 weeks ago  Reports no incontinence of bowel or bladder   Assessment Findings:   1  Bilateral heels dry and intact with noted lower extremity edema   Heels elevated off of the bed with pillows   2  Sacral inner buttocks - present on admission evolving DTI with etiology of pressure friction and shear   Wound bed is fragile jagged and pink with lower portion having thin white tissue   Noted left lateral edges with slight induration   Purple hue of the jessica wound area with areas being blanchable   Suspect stage 3 stage 4 or unstageable of the evolving DTI - POA   Discussed the plan of care with the patient   Skin care plans:  1-Hydraguard to bilateral  heels BID  PRN  2-Elevate heels to offload pressure  3-Ehob cushion in chair when out of bed  4-Moisturize skin daily with skin nourishing cream   5-Turn/reposition q2h or when medically stable for pressure re-distribution on skin  Wound care plan   1  Mid sacral / inner buttocks area - cleanse with  Normal saline prep jessica wound with 3M Cavilon no sting to the jessica wound area then apply xeroform to the wound bed then top with a Allevyn foam salazar with a T and date change every other day or if soiled   2  P - 500 low air loss mattress             Vitals: Blood pressure 142/60, pulse 93, temperature (!) 97 3 °F (36 3 °C), resp  rate 16, weight 80 2 kg (176 lb 12 9 oz), SpO2 91 %  ,Body mass index is 26 88 kg/m²  Wound 07/15/21 Pressure Injury Buttocks Inner (Active)   Wound Image   07/16/21 1027   Wound Description Fragile;Light purple;Pink; White 07/16/21 1027   Pressure Injury Stage DTPI 07/16/21 1027   Brea-wound Assessment Fragile;Pink 07/16/21 1027   Wound Length (cm) 9 5 cm 07/16/21 1027   Wound Width (cm) 4 cm 07/16/21 1027   Wound Depth (cm) 0 2 cm 07/16/21 1027   Wound Surface Area (cm^2) 38 cm^2 07/16/21 1027   Wound Volume (cm^3) 7 6 cm^3 07/16/21 1027   Calculated Wound Volume (cm^3) 7 6 cm^3 07/16/21 1027   Drainage Amount Small 07/16/21 1027   Drainage Description Serosanguineous 07/16/21 1027   Treatments Cleansed;Irrigation with NSS 07/16/21 1027   Dressing Foam, Silicon (eg  Allevyn, etc); Xeroform 07/16/21 1027   Wound packed?  No 07/15/21 0231   Dressing Changed Changed 07/16/21 1027   Patient Tolerance Tolerated well 07/16/21 1027       Wound care will follow weekly call or tiger text with questions     Chai Hopkins RN BSN CWOCN

## 2021-07-16 NOTE — ASSESSMENT & PLAN NOTE
· Dysphagia with hoarseness    Advised this is secondary to lung malignancy  · Patient had VBS today; continue mechanical soft with thin liquids

## 2021-07-16 NOTE — PROGRESS NOTES
[address] [facility]  Progress Note - Babita Gerber 1935, 82 y o  male MRN: [id number]  Unit/Bed#: E5 -01 Encounter: [id number]  Primary Care Provider: Fredo Casiano DO   Date and time admitted to hospital: 7/14/2021  7:59 PM    * Acute respiratory insufficiency  Assessment & Plan  · Likely in the setting of lung cancer however symptoms did improve with diuretics  · Was on IV furosemide but has been transitioned to torsemide by cardiology today  Follow-up on echocardiogram today  · Pulmonology started prednisone with azithromycin for COPD exacerbation    Severe protein-calorie malnutrition ([facility] [address] )  Assessment & Plan  Malnutrition Findings:   Adult Malnutrition type: Acute illness  Adult Degree of Malnutrition: Other severe protein calorie malnutrition (Severe pro/rené malnutrition r/t cancer related issues as evidenced by 4% unplanned wt loss since 7/2/21, consuming < 50% energy intake compared to estimated energy needs > 5 days  Treated with modified diet and Enlive tid)  BMI Findings: Body mass index is 26 88 kg/m²  Acute kidney injury ([facility] [address] )  Assessment & Plan  · BIJAN secondary to volume overload  · Monitor with administration of diuretic  Results from last 7 days   Lab Units 07/16/21  0454 07/15/21  0344 07/14/21 2028   BUN mg/dL 67* 63* 70*   CREATININE mg/dL 1 63* 1 56* 1 79*   EGFR ml/min/1 73sq m 38 40 34       Hypokalemia  Assessment & Plan  · Secondary to diuretic administration  · Replete with 80 mEq today and recheck in a m  Results from last 7 days   Lab Units 07/16/21  0454 07/15/21  0344 07/14/21 2028   POTASSIUM mmol/L 2 6* 3 5 2 8*       Cancer related pain  Assessment & Plan  · Continue morphine ER and oxycodone p r n  Malignant neoplasm of upper lobe of right lung [facility])  Assessment & Plan  · 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 follows with [facility] oncology    Other dysphagia  Assessment & Plan  · Dysphagia with hoarseness    Advised this is secondary to lung malignancy  · Patient had VBS today; continue mechanical soft with thin liquids    Benign essential hypertension  Assessment & Plan  · Continue amlodipine carvedilol and clonidine      VTE Pharmacologic Prophylaxis: VTE Score: 8 High Risk (Score >/= 5) - Pharmacological DVT Prophylaxis Ordered: Enoxaparin (Lovenox)  Sequential Compression Devices Ordered  Patient Centered Rounds: I have performed bedside rounds with nursing staff today  Discussions with Specialists or Other Care Team Provider:  Pulmonology    Education and Discussions with Family / Patient:  Daughter at bedside    Time Spent for Care: 25 mins  More than 50% of total time spent on counseling and coordination of care as described above  Current Length of Stay: 2 day(s)  Current Patient Status: Inpatient   Certification Statement: The patient will continue to require additional inpatient hospital stay due to respiratory insufficiency  Discharge Plan / Estimated Discharge Date: Anticipate discharge tomorrow to home with home services  Code Status: Level 1 - Full Code      Subjective:   Patient seen and examined  Less short of breath today  Less hoarse  Objective:   Vitals: Blood pressure 113/83, pulse 66, temperature 98 5 °F (36 9 °C), temperature source Oral, resp  rate 18, weight 80 2 kg (176 lb 12 9 oz), SpO2 90 %  Physical Exam  Vitals reviewed  Constitutional:       General: He is not in acute distress  Cardiovascular:      Rate and Rhythm: Regular rhythm  Heart sounds: Normal heart sounds  Pulmonary:      Effort: Pulmonary effort is normal       Breath sounds: Decreased breath sounds and wheezing present  Abdominal:      General: Bowel sounds are normal       Palpations: Abdomen is soft  Tenderness: There is no abdominal tenderness  There is no rebound  Musculoskeletal:         General: Swelling (Lower extremities bilaterally) present  No tenderness  Skin:     General: Skin is warm and dry  Neurological:      Mental Status: He is alert and oriented to person, place, and time  Cranial Nerves: No cranial nerve deficit  Psychiatric:         Mood and Affect: Mood normal        Additional Data:   Labs:  Results from last 7 days   Lab Units 07/16/21  0454 07/15/21  0344 07/14/21  2028   WBC Thousand/uL 7 15 7 06 9 16   HEMOGLOBIN g/dL 12 4 12 3 13 0   HEMATOCRIT % 37 8 36 5 39 2   MCV fL 91 90 91   PLATELETS Thousands/uL 212 209 244   INR   --   --  1 36*     Results from last 7 days   Lab Units 07/16/21 0454 07/15/21  0344 07/14/21  2028   SODIUM mmol/L 138 139 137   POTASSIUM mmol/L 2 6* 3 5 2 8*   CHLORIDE mmol/L 98* 99* 93*   CO2 mmol/L 35* 32 40*   ANION GAP mmol/L 5 8 4   BUN mg/dL 67* 63* 70*   CREATININE mg/dL 1 63* 1 56* 1 79*   CALCIUM mg/dL 8 7 8 7 8 6   ALBUMIN g/dL 2 5*  --   --    TOTAL BILIRUBIN mg/dL 0 35  --   --    ALK PHOS U/L 88  --   --    ALT U/L 49  --   --    AST U/L 20  --   --    EGFR ml/min/1 73sq m 38 40 34   GLUCOSE RANDOM mg/dL 107 153* 128         Results from last 7 days   Lab Units 07/14/21 2028   TROPONIN I ng/mL <0 02     Results from last 7 days   Lab Units 07/14/21 2028   NT-PRO BNP pg/mL 1,605*      Results from last 7 days   Lab Units 07/16/21 0454   PROCALCITONIN ng/ml <0 05                 * I Have Reviewed All Lab Data Listed Above  Cultures:   Results from last 7 days   Lab Units 07/15/21  1334   SPUTUM CULTURE  Culture too young- will reincubate                 Lines/Drains:  Invasive Devices     Peripheral Intravenous Line            Peripheral IV 07/15/21 Right Forearm 1 day              Telemetry:      Imaging:  Imaging Reports Reviewed Today Include:   CT soft tissue neck with contrast    Result Date: 7/14/2021  Impression: 1  No evidence of mass or lymphadenopathy in the neck  2   Right upper lobe and hilar mass consistent with primary lung malignancy, further evaluated on the accompanying chest CT report   Workstation performed: [id number] CT chest with contrast    Result Date: 7/14/2021  Impression: 1  Right upper lobe, hilar and paratracheal mass measuring 8 2 x 5 1 x 7 6 cm consistent with primary lung malignancy  Encasement of the right mainstem bronchus and proximal segmental bronchi without evidence of endobronchial mass or occlusion  2   Satellite 2 to 3 mm pulmonary nodules in the apical region of the right upper lobe  3   High right paratracheal 1 6 cm lymph node, possibly necrotic  Subcarinal 2 cm lymph node   Workstation performed: [id number]     Scheduled Meds:  Current Facility-Administered Medications   Medication Dose Route Frequency Provider Last Rate    acetaminophen  650 mg Oral Q6H PRN Ranny Maxin, CRNP      albuterol  2 puff Inhalation Q4H PRN Ranny Maxin, CRNP      aluminum-magnesium hydroxide-simethicone  30 mL Oral Q6H PRN Ranny Maxin, CRNP      amLODIPine  10 mg Oral Daily Ranny Maxin, CRNP      aspirin  81 mg Oral Daily Ranny Maxin, CRNP      azithromycin  500 mg Oral Q24H Rosea Miguel, CRNP      bisacodyl  5 mg Oral Daily PRN Ranny Maxin, CRNP      budesonide  0 5 mg Nebulization Q12H Rosea Miguel, CRNP      carvedilol  6 25 mg Oral BID With Meals Ranny Maxin, CRNP      cloNIDine  0 1 mg Oral HS Ranny Maxin, CRNP      enoxaparin  30 mg Subcutaneous Daily Ranny Maxin, CRNP      guaiFENesin  200 mg Oral Q4H PRN Ranny Maxin, CRNP      HYDROmorphone  0 2 mg Intravenous Q4H PRN Ranny Maxin, CRNP      ipratropium-albuterol  3 mL Nebulization TID Rawleigh Ouch, DO      ipratropium-albuterol  3 mL Nebulization Q6H PRN Rawleigh Ouch, DO      levothyroxine  75 mcg Oral Early Morning Ranny Maxin, CRNP      morphine  30 mg Oral Q12H [address] Ranny Maxin, CRNP      ondansetron  4 mg Intravenous Q6H PRN Ranny Maxin, CRNP      oxyCODONE  20 mg Oral BID Ranny Maxin, CRNP      oxyCODONE  20 mg Oral Q6H PRN LEE Greer      polyethylene glycol  17 g Oral Daily [place], [address]      predniSONE  40 mg Oral Daily LEE Neil      primidone  50 mg Oral Q12H [address] [place], [address]      simethicone  80 mg Oral 4x Daily PRN LEE Greer      torsemide  20 mg Oral BID DO Carlos Dong,   [facility] Internal Medicine  Hospitalist    ** Please Note: This note has been constructed using a voice recognition system   **

## 2021-07-16 NOTE — ASSESSMENT & PLAN NOTE
Malnutrition Findings:   Adult Malnutrition type: Acute illness  Adult Degree of Malnutrition: Other severe protein calorie malnutrition (Severe pro/rené malnutrition r/t cancer related issues as evidenced by 4% unplanned wt loss since 7/2/21, consuming < 50% energy intake compared to estimated energy needs > 5 days  Treated with modified diet and Enlive tid)  BMI Findings: Body mass index is 26 88 kg/m²

## 2021-07-16 NOTE — PROGRESS NOTES
Progress Note - Pulmonary   aHrry Mckoy 82 y o  male MRN: [id number]  Unit/Bed#: E5 -01 Encounter: [id number]      Assessment/Plan:    1  Acute pulmonary insufficiency multifactorial secondary to below  1  Pulmonary toilet: IS, cough deep breathe, increase activity as tolerated  2  Titrate supplemental O2 as needed to maintain SpO2 >/=89%  3  Current O2 needs RA  4  Baseline needs RA  2  Suspected COPD of unknown severity/asthma with acute exacerbation  1  continue zithromax 500mg x 3 days  2  continue prednisone 40mg x 5 days  3  Continue duoneb TID  4  continue budesonide BID  5  sputum culture-results pending  6  Recommend transition to Anoro at discharge-D/C flovent  7  Outpatient pulmonary follow up at [facility]  3  Acute volume overload  1  Continue diuresis per IM  2  Echocardiogram  resuls pending  3  Daily weights weight loss 2lbs since admission  4  Strict I &O -770ml since admission  4  Dysphagia  1  Speech following  2  VBS today  5  Squamous cell carcinoma  1  S/p 1 dose of chemotherapy, radiation and currently on Keytruda  2  Follow up [facility] oncology             Subjective:     Yamiletsheyla Ramila was seen in the chair upon entering the room  Denies acute overnight events  Overall breathing has improved today  Continues with cough productive of large amounts of clear/green sputum  Denies: chest pain, fevers, chills, or nausea  No hemoptysis this morning    Objective:         Vitals: Blood pressure 151/67, pulse 64, temperature (!) 97 3 °F (36 3 °C), resp  rate 16, weight 80 2 kg (176 lb 12 9 oz), SpO2 93 % , RA, Body mass index is 26 88 kg/m²        Intake/Output Summary (Last 24 hours) at 7/16/2021 0855  Last data filed at 7/16/2021 0417  Gross per 24 hour   Intake --   Output 870 ml   Net -870 ml         Physical Exam  Gen: Awake, alert, oriented x 3, no acute distress  HEENT: Mucous membranes moist, no oral lesions, no thrush  NECK: no accessory muscle use, JVP not elevated  Cardiac: Regular, single S1, single S2, no murmurs, no rubs, no gallops  Lungs: diffuse expiratory wheezes noted  Abdomen: normoactive bowel sounds, soft nontender, nondistended, no rebound or rigidity, no guarding  Extremities: no cyanosis, no clubbing, no edema    Labs: I have personally reviewed pertinent lab results  , CBC:   Lab Results   Component Value Date    WBC 7 15 07/16/2021    HGB 12 4 07/16/2021    HCT 37 8 07/16/2021    MCV 91 07/16/2021     07/16/2021    MCH 29 9 07/16/2021    MCHC 32 8 07/16/2021    RDW 13 2 07/16/2021    MPV 11 7 07/16/2021   , CMP:   Lab Results   Component Value Date    SODIUM 138 07/16/2021    K 2 6 (LL) 07/16/2021    CL 98 (L) 07/16/2021    CO2 35 (H) 07/16/2021    BUN 67 (H) 07/16/2021    CREATININE 1 63 (H) 07/16/2021    CALCIUM 8 7 07/16/2021    AST 20 07/16/2021    ALT 49 07/16/2021    ALKPHOS 88 07/16/2021    EGFR 38 07/16/2021     Imaging and other studies: none      Dionne Whitlock

## 2021-07-17 VITALS
OXYGEN SATURATION: 93 % | TEMPERATURE: 96.4 F | WEIGHT: 176.81 LBS | HEART RATE: 91 BPM | RESPIRATION RATE: 19 BRPM | BODY MASS INDEX: 26.88 KG/M2 | DIASTOLIC BLOOD PRESSURE: 61 MMHG | SYSTOLIC BLOOD PRESSURE: 138 MMHG

## 2021-07-17 LAB
ALBUMIN SERPL BCP-MCNC: 2.6 G/DL (ref 3.5–5)
ALP SERPL-CCNC: 91 U/L (ref 46–116)
ALT SERPL W P-5'-P-CCNC: 76 U/L (ref 12–78)
ANION GAP SERPL CALCULATED.3IONS-SCNC: 4 MMOL/L (ref 4–13)
AST SERPL W P-5'-P-CCNC: 35 U/L (ref 5–45)
BILIRUB SERPL-MCNC: 0.27 MG/DL (ref 0.2–1)
BUN SERPL-MCNC: 65 MG/DL (ref 5–25)
CALCIUM ALBUM COR SERPL-MCNC: 9.7 MG/DL (ref 8.3–10.1)
CALCIUM SERPL-MCNC: 8.6 MG/DL (ref 8.3–10.1)
CHLORIDE SERPL-SCNC: 98 MMOL/L (ref 100–108)
CO2 SERPL-SCNC: 38 MMOL/L (ref 21–32)
CREAT SERPL-MCNC: 1.7 MG/DL (ref 0.6–1.3)
ERYTHROCYTE [DISTWIDTH] IN BLOOD BY AUTOMATED COUNT: 13.2 % (ref 11.6–15.1)
GFR SERPL CREATININE-BSD FRML MDRD: 36 ML/MIN/1.73SQ M
GLUCOSE SERPL-MCNC: 98 MG/DL (ref 65–140)
HCT VFR BLD AUTO: 38.5 % (ref 36.5–49.3)
HGB BLD-MCNC: 12.7 G/DL (ref 12–17)
MCH RBC QN AUTO: 30.6 PG (ref 26.8–34.3)
MCHC RBC AUTO-ENTMCNC: 33 G/DL (ref 31.4–37.4)
MCV RBC AUTO: 93 FL (ref 82–98)
PLATELET # BLD AUTO: 220 THOUSANDS/UL (ref 149–390)
PMV BLD AUTO: 11.8 FL (ref 8.9–12.7)
POTASSIUM SERPL-SCNC: 3.5 MMOL/L (ref 3.5–5.3)
PROT SERPL-MCNC: 7.3 G/DL (ref 6.4–8.2)
RBC # BLD AUTO: 4.15 MILLION/UL (ref 3.88–5.62)
SODIUM SERPL-SCNC: 140 MMOL/L (ref 136–145)
WBC # BLD AUTO: 7.16 THOUSAND/UL (ref 4.31–10.16)

## 2021-07-17 PROCEDURE — 99239 HOSP IP/OBS DSCHRG MGMT >30: CPT | Performed by: INTERNAL MEDICINE

## 2021-07-17 PROCEDURE — 80053 COMPREHEN METABOLIC PANEL: CPT | Performed by: INTERNAL MEDICINE

## 2021-07-17 PROCEDURE — 85027 COMPLETE CBC AUTOMATED: CPT | Performed by: INTERNAL MEDICINE

## 2021-07-17 PROCEDURE — 94640 AIRWAY INHALATION TREATMENT: CPT

## 2021-07-17 PROCEDURE — 99232 SBSQ HOSP IP/OBS MODERATE 35: CPT | Performed by: INTERNAL MEDICINE

## 2021-07-17 RX ORDER — PRIMIDONE 50 MG/1
50 TABLET ORAL EVERY 12 HOURS SCHEDULED
Start: 2021-07-17

## 2021-07-17 RX ORDER — POTASSIUM CHLORIDE 750 MG/1
10 TABLET, FILM COATED, EXTENDED RELEASE ORAL DAILY
Qty: 30 TABLET | Refills: 0 | Status: SHIPPED | OUTPATIENT
Start: 2021-07-17

## 2021-07-17 RX ORDER — PREDNISONE 10 MG/1
TABLET ORAL
Qty: 30 TABLET | Refills: 0 | Status: SHIPPED | OUTPATIENT
Start: 2021-07-17 | End: 2021-08-06 | Stop reason: HOSPADM

## 2021-07-17 RX ORDER — TORSEMIDE 20 MG/1
20 TABLET ORAL DAILY
Qty: 30 TABLET | Refills: 0 | Status: SHIPPED | OUTPATIENT
Start: 2021-07-19 | End: 2021-08-06 | Stop reason: HOSPADM

## 2021-07-17 RX ORDER — UMECLIDINIUM BROMIDE AND VILANTEROL TRIFENATATE 62.5; 25 UG/1; UG/1
1 POWDER RESPIRATORY (INHALATION) DAILY
Qty: 60 BLISTER | Refills: 0 | Status: SHIPPED | OUTPATIENT
Start: 2021-07-17 | End: 2021-08-16

## 2021-07-17 RX ORDER — TORSEMIDE 20 MG/1
20 TABLET ORAL DAILY
Status: DISCONTINUED | OUTPATIENT
Start: 2021-07-19 | End: 2021-07-17 | Stop reason: HOSPADM

## 2021-07-17 RX ADMIN — POLYETHYLENE GLYCOL 3350 17 G: 17 POWDER, FOR SOLUTION ORAL at 10:14

## 2021-07-17 RX ADMIN — IPRATROPIUM BROMIDE AND ALBUTEROL SULFATE 3 ML: 2.5; .5 SOLUTION RESPIRATORY (INHALATION) at 07:45

## 2021-07-17 RX ADMIN — MORPHINE SULFATE 30 MG: 30 TABLET, EXTENDED RELEASE ORAL at 09:58

## 2021-07-17 RX ADMIN — OXYCODONE HYDROCHLORIDE 20 MG: 10 TABLET ORAL at 09:55

## 2021-07-17 RX ADMIN — PREDNISONE 40 MG: 20 TABLET ORAL at 09:58

## 2021-07-17 RX ADMIN — CARVEDILOL 6.25 MG: 6.25 TABLET, FILM COATED ORAL at 09:58

## 2021-07-17 RX ADMIN — PRIMIDONE 50 MG: 50 TABLET ORAL at 10:25

## 2021-07-17 RX ADMIN — IPRATROPIUM BROMIDE AND ALBUTEROL SULFATE 3 ML: 2.5; .5 SOLUTION RESPIRATORY (INHALATION) at 13:26

## 2021-07-17 RX ADMIN — ASPIRIN 81 MG: 81 TABLET, CHEWABLE ORAL at 09:55

## 2021-07-17 RX ADMIN — LEVOTHYROXINE SODIUM 75 MCG: 75 TABLET ORAL at 05:03

## 2021-07-17 RX ADMIN — BUDESONIDE 0.5 MG: 0.5 INHALANT ORAL at 07:45

## 2021-07-17 RX ADMIN — AMLODIPINE BESYLATE 10 MG: 10 TABLET ORAL at 09:58

## 2021-07-17 RX ADMIN — ENOXAPARIN SODIUM 30 MG: 30 INJECTION, SOLUTION INTRAVENOUS; SUBCUTANEOUS at 09:53

## 2021-07-17 NOTE — ASSESSMENT & PLAN NOTE
· Secondary to diuretic administration    · Has been repleted and will be discharged with 10 mEq daily while on diuretics    Results from last 7 days   Lab Units 07/17/21  0455 07/16/21  0454 07/15/21  0344 07/14/21 2028   POTASSIUM mmol/L 3 5 2 6* 3 5 2 8*

## 2021-07-17 NOTE — DISCHARGE INSTRUCTIONS
Thank you for allowing us to participate in the care of you who was hospitalized from 7/14/2021 through 7/17/2021 with the admitting diagnosis of shortness of breath  Your found to have COPD with mild exacerbation as well as excess volume  He will be discharged with prednisone taper as well as changing furosemide to torsemide        DO [facility] [address] Internal Medicine, Hospitalist

## 2021-07-17 NOTE — DISCHARGE SUMMARY
[address] [facility]  Discharge- Babita Gerber 1935, 82 y o  male MRN: [id number]  Unit/Bed#: E5 -01 Encounter: [id number]  Primary Care Provider: Fredo Casiano DO   Date and time admitted to hospital: 7/14/2021  7:59 PM    Admitting Provider:  Александр Torres DO  Discharge Provider:  Александр Torres DO  Admission Date: 7/14/2021       Discharge Date: 07/17/21   LOS: 3  Primary Care Physician at Discharge: Fredo Casiano, [address]:  Babiat Gerber is a 82 y o  male with a history of lung cancer who presented to the hospital after brought in by family for worsening shortness of breath  The patient does have lung cancer follows with pulmonology and Oncology at [address]  The patient had reached out to his cardiologist Dr Kristian Kearney due to worsening shortness of breath and despite increasing furosemide to 40 mg b i d  he continued to have symptoms  He was started on IV furosemide and readily transitioned to torsemide  He did have mild kidney injury  He is being prepped torsemide 20 mg daily to start on 7/19/2021  He was also seen by pulmonology who recommended he changes inhaler from Flovent to Anoro    His symptoms have much improved and he is on room air and stable  Case was discussed with spouse and daughter during hospitalization    Please see problem list listed below  DISCHARGE DIAGNOSES  * Acute respiratory insufficiency  Assessment & Plan  · Likely in the setting of lung cancer however symptoms did improve with diuretics  · Was on IV furosemide but has been transitioned to torsemide by cardiology  · Pulmonology started prednisone with azithromycin for COPD exacerbation    Patient will be discharged with prednisone taper and to use Anoro instead of Symbicort as recommended    Severe protein-calorie malnutrition ([facility] [address] )  Assessment & Plan  Malnutrition Findings:   Adult Malnutrition type: Acute illness  Adult Degree of Malnutrition: Other severe protein calorie malnutrition (Severe pro/rené malnutrition r/t cancer related issues as evidenced by 4% unplanned wt loss since 7/2/21, consuming < 50% energy intake compared to estimated energy needs > 5 days  Treated with modified diet and Enlive tid)  BMI Findings: Body mass index is 26 88 kg/m²  Acute kidney injury ([facility] [address] )  Assessment & Plan  · BIJAN secondary to volume overload  · Stable with administration of diuretic  · Cardiology recommends holding diuretic this weekend and can start torsemide 20 mg daily starting Monday 7/19/2021    Results from last 7 days   Lab Units 07/17/21  0455 07/16/21  0454 07/15/21  0344 07/14/21  2028   BUN mg/dL 65* 67* 63* 70*   CREATININE mg/dL 1 70* 1 63* 1 56* 1 79*   EGFR ml/min/1 73sq m 36 38 40 34       Hypokalemia  Assessment & Plan  · Secondary to diuretic administration  · Has been repleted and will be discharged with 10 mEq daily while on diuretics    Results from last 7 days   Lab Units 07/17/21 0455 07/16/21  0454 07/15/21  0344 07/14/21  2028   POTASSIUM mmol/L 3 5 2 6* 3 5 2 8*       Cancer related pain  Assessment & Plan  · Continue morphine ER and oxycodone p r n  Malignant neoplasm of upper lobe of right lung [facility])  Assessment & Plan  · 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 follows with [facility] oncology    Other dysphagia  Assessment & Plan  · Dysphagia with hoarseness  For seen by ENT and had laryngoscope without positive  · Patient had VBS; continue mechanical soft with thin liquids    Benign essential hypertension  Assessment & Plan  · Continue amlodipine carvedilol and clonidine    CONSULTING PROVIDERS   IP CONSULT TO CASE MANAGEMENT  IP CONSULT TO PULMONOLOGY  IP CONSULT TO CARDIOLOGY  IP CONSULT TO ENT    PROCEDURES PERFORMED  Echo complete with contrast if indicated  Result Date: 7/16/2021  SUMMARY: 1  This is a technically difficult study 2  Left ventricle is normal size and function  Left ventricular ejection fraction is estimated at 58%   3  Grade 1 diastolic dysfunction  4  There are no obvious high-grade regional wall motion abnormalities  Endocardium still mildly limited due to poor acoustic windows despite use of echo contrast 5  Right atrium is mildly dilated 6  Aortic valve is sclerotic with adequate separation 7  Trace mitral regurgitation  Mild mitral annular calcification 8  Trace tricuspid regurgitation pulmonary artery systolic pressure is estimated at 25-30 mm Hg 9  Aortic root is mildly dilated 3 8 cm 10  There is no study for comparison       FL barium swallow video w speech  Result Date: 7/16/2021  Narrative: A video barium swallow study was performed by the Department of Speech Pathology  Please refer to the report for the official interpretation  The images are stored for archival purposes only  Study images were not formally reviewed by the Radiology Department  RADIOLOGY RESULTS  CT soft tissue neck with contrast  Result Date: 7/14/2021  Impression: 1  No evidence of mass or lymphadenopathy in the neck  2   Right upper lobe and hilar mass consistent with primary lung malignancy, further evaluated on the accompanying chest CT report  Workstation performed: [id number]     CT chest with contrast  Result Date: 7/14/2021  Impression: 1  Right upper lobe, hilar and paratracheal mass measuring 8 2 x 5 1 x 7 6 cm consistent with primary lung malignancy  Encasement of the right mainstem bronchus and proximal segmental bronchi without evidence of endobronchial mass or occlusion  2   Satellite 2 to 3 mm pulmonary nodules in the apical region of the right upper lobe  3   High right paratracheal 1 6 cm lymph node, possibly necrotic  Subcarinal 2 cm lymph node  Workstation performed: [id number]     VAS lower limb venous duplex study, complete bilateral  Result Date: 7/4/2021  Impression: RIGHT LOWER LIMB: No evidence of acute or chronic deep vein thrombosis  No evidence of superficial thrombophlebitis noted   Doppler evaluation shows reflux in the popliteal, posterior tibial and peroneal veins, consistent with deep venous insufficiency  Popliteal, posterior tibial and anterior tibial arterial Doppler waveforms are biphasic  LEFT LOWER LIMB: No evidence of acute or chronic deep vein thrombosis  No evidence of superficial thrombophlebitis noted  Doppler evaluation shows a normal response to augmentation maneuvers  Popliteal, posterior tibial and anterior tibial arterial Doppler waveforms are biphasic  Technical findings were given to Dr Edyta Espinosa  Jul  2 2021 12:53PM    SIGNATURE: Electronically Signed by: Tatiana Stokes MD on 2021-07-04 10:50:28 PM    LABS  Results from last 7 days   Lab Units 07/17/21  0455 07/16/21 0454 07/15/21  0344 07/14/21  2028   WBC Thousand/uL 7 16 7 15 7 06 9 16   HEMOGLOBIN g/dL 12 7 12 4 12 3 13 0   HEMATOCRIT % 38 5 37 8 36 5 39 2   MCV fL 93 91 90 91   PLATELETS Thousands/uL 220 212 209 244   INR   --   --   --  1 36*     Results from last 7 days   Lab Units 07/17/21  0455 07/16/21  0454 07/15/21  0344 07/14/21  2028   SODIUM mmol/L 140 138 139 137   POTASSIUM mmol/L 3 5 2 6* 3 5 2 8*   CHLORIDE mmol/L 98* 98* 99* 93*   CO2 mmol/L 38* 35* 32 40*   BUN mg/dL 65* 67* 63* 70*   CREATININE mg/dL 1 70* 1 63* 1 56* 1 79*   CALCIUM mg/dL 8 6 8 7 8 7 8 6   ALBUMIN g/dL 2 6* 2 5*  --   --    TOTAL BILIRUBIN mg/dL 0 27 0 35  --   --    ALK PHOS U/L 91 88  --   --    ALT U/L 76 49  --   --    AST U/L 35 20  --   --    EGFR ml/min/1 73sq m 36 38 40 34   GLUCOSE RANDOM mg/dL 98 107 153* 128     Results from last 7 days   Lab Units 07/14/21 2028   TROPONIN I ng/mL <0 02     Results from last 7 days   Lab Units 07/14/21 2028   NT-PRO BNP pg/mL 1,605*                      Results from last 7 days   Lab Units 07/16/21 0454   PROCALCITONIN ng/ml <0 05         DISCHARGE DAY VISIT AND PHYSICAL EXAM:  Subjective:  Patient seen and examined during morning rounds  Feeling very good and is interested in discharge home      Vitals:   Blood Pressure: 138/61 (07/17/21 1002)  Pulse: 91 (07/17/21 1002)  Temperature: (!) 96 4 °F (35 8 °C) (07/16/21 2131)  Temp Source: Oral (07/16/21 2131)  Respirations: 19 (07/17/21 0734)  Weight - Scale: 80 2 kg (176 lb 12 9 oz) (07/16/21 0600)  SpO2: 92 % (07/17/21 1002)    Physical Exam  Vitals reviewed  Constitutional:       General: He is not in acute distress  Cardiovascular:      Rate and Rhythm: Regular rhythm  Heart sounds: Normal heart sounds  Pulmonary:      Effort: Pulmonary effort is normal       Breath sounds: Decreased breath sounds present  No wheezing  Abdominal:      General: Bowel sounds are normal       Palpations: Abdomen is soft  Tenderness: There is no abdominal tenderness  There is no rebound  Musculoskeletal:         General: Swelling (Lower extremities bilaterally) present  No tenderness  Skin:     General: Skin is warm and dry  Neurological:      General: No focal deficit present  Mental Status: He is alert and oriented to person, place, and time  Cranial Nerves: No cranial nerve deficit  Psychiatric:         Mood and Affect: Mood normal        Planned Re-admission:  No  Discharge Disposition: Home/Self Care    Test Results Pending at Discharge:   Pending Labs     Order Current Status    Sputum culture and Gram stain Preliminary result      Incidental findings:     Medications   · Discharge Medication List: See after visit summary for reconciled discharge medications  Diet restrictions:  Mechanical soft thin liquid diet   Activity restrictions: No strenuous activity  Discharge Condition: stable    Outpatient Follow-Up and Discharge Instructions  See after visit summary section titled Discharge Instructions for information provided to patient and family  Code Status: Level 1 - Full Code  Discharge Statement   I spent 35 minutes discharging the patient  This time was spent on the day of discharge   Greater than 50% of total time was spent with the patient and / or family counseling and / or coordination of care  ** Please Note: This note has been constructed using a voice recognition system   **

## 2021-07-17 NOTE — ASSESSMENT & PLAN NOTE
· Dysphagia with hoarseness  For seen by ENT and had laryngoscope without positive    · Patient had VBS; continue mechanical soft with thin liquids

## 2021-07-17 NOTE — PROGRESS NOTES
Cardiology Progress Note   MD Charity Pierre MD Olena Abt, DO, MD Shayy Kyle DO, Leticia Cade DO, [facility] [place] [place]  ----------------------------------------------------------------  [address], [address] 82 y o  male MRN: [id number]  Unit/Bed#: E5 -01 Encounter: [id number]      ASSESSMENT:   · Shortness of breath/dyspnea on exertion  · Lower extremity swelling  · Non-small cell lung cancer, stage IV plan for palliative chemotherapy  · Hypertension  · Dyslipidemia  · Hypothyroid  · Venous insufficiency  · LVEF 31%, grade 1 diastolic dysfunction, mild RV dilatation, mild RA dilatation, AV sclerosis, trace MR, mild MAC, trace TR w/ PASP 25-30 mmHg, mild aortic root dilatation at 3 8 cm, July 2021  · History of mild carotid stenosis bilaterally, 2019  · Pharmacologic nuclear stress test negative for myocardial ischemia, gated EF 72%, August 2019    PLAN:  Patient appears to be euvolemic examination  Echocardiogram showed normal left ventricular function with some right-sided dilatation; likely related to elevated right-sided pressures with pulmonary process  Creatinine has slightly elevated this morning compared to baseline  Will hold diuretic until tomorrow or Monday and then restart at torsemide 20 mg daily with rising creatinine   If patient's syncopal tomorrow, can repeat creatinine and if trending down, would start tomorrow otherwise, can start Monday  Would repeat BMP within 1 week  Keep potassium greater than 4 and magnesium greater than 2  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  Continue clonidine, carvedilol, amlodipine and aspirin  As patient is feeling back to baseline, reasonable for discharge from CV perspective with outpatient follow-up with Cardiology, Pulmonary and Oncology  Cardiology follow-up within 2 weeks of discharge for additional CV testing as clinically indicated  Should he gain greater than 3 lb in a day or 5 lb overall, recommend call the office for further titration of diuretic medication  Will see further inpatient p r n  Signed: Julieta Whitmore DO, [facility] [place], [facility]      History of Present Illness:  Patient seen and examined  Denies chest pain, pressure, tightness or squeezing  Denies lightheadedness, dizziness or patient's  Denies orthopnea or paroxysmal nocturnal dyspnea    Review of Systems:  Review of Systems   Constitutional: Negative for decreased appetite, fever, weight gain and weight loss  HENT: Negative for congestion and sore throat  Eyes: Negative for visual disturbance  Cardiovascular: Positive for leg swelling  Negative for chest pain, dyspnea on exertion, near-syncope and palpitations  Respiratory: Negative for cough and shortness of breath  Hematologic/Lymphatic: Negative for bleeding problem  Skin: Negative for rash  Musculoskeletal: Negative for myalgias and neck pain  Gastrointestinal: Negative for abdominal pain and nausea  Neurological: Negative for light-headedness and weakness  Psychiatric/Behavioral: Negative for depression  Allergies   Allergen Reactions    Lisinopril      Swelling of tongue    Sulfa Antibiotics Other (See Comments)     pancreatitis    Other reaction(s): Other (Please comment)  Pancreas destroy itself    Zestoretic [Lisinopril-Hydrochlorothiazide]      Pancreatitis       No current facility-administered medications on file prior to encounter       Current Outpatient Medications on File Prior to Encounter   Medication Sig    cloNIDine (CATAPRES) 0 1 mg tablet Take 0 1 mg by mouth daily at bedtime     polyethylene glycol (MIRALAX) 17 g packet Take 17 g by mouth daily    senna-docusate sodium (SENOKOT-S) 8 6-50 mg per tablet Take 1 tablet by mouth daily before dinner    albuterol (PROVENTIL HFA,VENTOLIN HFA) 90 mcg/act inhaler     amLODIPine (NORVASC) 10 mg tablet Take 10 mg by mouth daily     amoxicillin (AMOXIL) 500 mg capsule  (Patient not taking: Reported on 7/2/2021)    Ascorbic Acid (VITAMIN C) 1000 MG tablet Take 500 mg by mouth daily  (Patient not taking: Reported on 7/2/2021)    ASPIRIN 81 PO Take 81 mg by mouth daily     Biotin 5 MG CAPS Take 10 mg by mouth daily  (Patient not taking: Reported on 7/2/2021)    carvedilol (COREG) 6 25 mg tablet Take 6 25 mg by mouth 2 (two) times a day with meals     Cholecalciferol (VITAMIN D3 PO) Take 4,000 tablets by mouth daily  (Patient not taking: Reported on 7/2/2021)    Coenzyme Q10 (CO Q 10) 10 MG CAPS Take 200 mg by mouth daily  (Patient not taking: Reported on 7/2/2021)    Flaxseed, Linseed, (FLAXSEED OIL) 1000 MG CAPS Take 1 capsule by mouth daily     FLOVENT DISKUS 250 MCG/BLIST AEPB 1 puff a day in the morning    furosemide (LASIX) 20 mg tablet Take 20 mg by mouth daily     Kelp 150 MCG TABS Take by mouth daily (Patient not taking: Reported on 7/2/2021)    Lecithin 1200 MG CAPS Take by mouth daily  (Patient not taking: Reported on 7/2/2021)    levothyroxine 75 mcg tablet Take 75 mcg by mouth daily     morphine (MS CONTIN) 30 mg 12 hr tablet TAKE 1 TABLET (30 MG TOTAL) BY MOUTH 2 (TWO) TIMES A DAY   MAX DAILY AMOUNT  60 MG    morphine (MS CONTIN) 60 mg 12 hr tablet     Multiple Vitamin (MULTI VITAMIN DAILY PO) Take 1 capsule by mouth daily  (Patient not taking: Reported on 7/2/2021)    Multiple Vitamins-Minerals (PRESERVISION AREDS PO) Take 1 tablet by mouth 2 (two) times a day  (Patient not taking: Reported on 7/2/2021)    Omega-3 Fatty Acids (FISH OIL PO) Take 4,800 mg by mouth  (Patient not taking: Reported on 7/2/2021)    oxyCODONE (ROXICODONE) 5 mg immediate release tablet as needed     predniSONE 20 mg tablet     primidone (MYSOLINE) 50 mg tablet Take 1tab bid, may slowly increase to 2tabs bid    prochlorperazine (COMPAZINE) 10 mg tablet     Red Yeast Rice 600 MG CAPS Take 600 mg by mouth Pt takes 4 per day (Patient not taking: Reported on 7/2/2021)    simvastatin (ZOCOR) 10 mg tablet Take 10 mg by mouth daily at bedtime     Vitamin E 400 units TABS Take 800 Units by mouth daily  (Patient not taking: Reported on 7/2/2021)        Current Facility-Administered Medications   Medication Dose Route Frequency Provider Last Rate    acetaminophen  650 mg Oral Q6H PRN Cristiano Severs, CRNP      albuterol  2 puff Inhalation Q4H PRN Cristiano Severs, CRNP      aluminum-magnesium hydroxide-simethicone  30 mL Oral Q6H PRN Cristiano Severs, CRNP      amLODIPine  10 mg Oral Daily Cristiano Severs, CRNP      aspirin  81 mg Oral Daily Cristiano Severs, CRNP      azithromycin  500 mg Oral Q24H Paxton Mario, CRNP      bisacodyl  5 mg Oral Daily PRN Cristiano Severs, CRNP      budesonide  0 5 mg Nebulization Q12H Paxton Mario, CRNP      carvedilol  6 25 mg Oral BID With Meals Cristiano Severs, CRNP      cloNIDine  0 1 mg Oral HS Cristiano Severs, CRNP      enoxaparin  30 mg Subcutaneous Daily Cristiano Severs, CRNP      guaiFENesin  200 mg Oral Q4H PRN Cristiano Severs, CRNP      HYDROmorphone  0 2 mg Intravenous Q4H PRN Cristiano Severs, CRNP      ipratropium-albuterol  3 mL Nebulization TID Woodland Flattery, DO      ipratropium-albuterol  3 mL Nebulization Q6H PRN Flor Flattery, DO      levothyroxine  75 mcg Oral Early Morning Cristiano Severs, CRNP      morphine  30 mg Oral Q12H [facility], CRNP      ondansetron  4 mg Intravenous Q6H PRN Cristiano Severs, CRNP      oxyCODONE  20 mg Oral BID Cristiano Severs, CRNP      oxyCODONE  20 mg Oral Q6H PRN Cristiano Severs, CRNP      phenol  1 spray Mouth/Throat Q2H PRN Flor Flattery, DO      polyethylene glycol  17 g Oral Daily Cristiano Severs, CRNP      predniSONE  40 mg Oral Daily Paxton Malden, CRNP      primidone  50 mg Oral Q12H [address] Cristiano Severs, CRNP      simethicone  80 mg Oral 4x Daily PRN Carolynn Sheppard Denman Cogan, CRNP      [START ON 7/19/2021] torsemide  20 mg Oral Daily Garrison Machado,               Vitals:    07/17/21 0015 07/17/21 0734 07/17/21 0748 07/17/21 1002   BP: 149/67 (!) 100/41  138/61   BP Location:       Pulse: 80 87  91   Resp: 20 19     Temp:       TempSrc:       SpO2: (!) 89% 93% 95% 92%   Weight:             Intake/Output Summary (Last 24 hours) at 7/17/2021 1114  Last data filed at 7/16/2021 1748  Gross per 24 hour   Intake 120 ml   Output --   Net 120 ml       Weight change:     PHYSICAL EXAMINATION:  Gen: Awake, Alert, NAD  Head/eyes: AT/NC, pupils equal and round, Anicteric  ENT: mmm  Neck: Supple, No elevated JVP, trachea midline  Resp:   decreased breath sounds otherwise clear bilaterally  CV: RRR +S1, S2, No m/r/g  Abd: Soft, NT/ND + BS  Ext: +1 LLE, trace RLE edema  Neuro: Follows commands, moves all extermities  Psych: Appropriate affect, normal mood, pleasant attitude, non-combative  Skin: warm; no rash, erythema or venous stasis changes on exposed skin    Lab Results:  Results from last 7 days   Lab Units 07/17/21  0455   WBC Thousand/uL 7 16   HEMOGLOBIN g/dL 12 7   HEMATOCRIT % 38 5   PLATELETS Thousands/uL 220     Results from last 7 days   Lab Units 07/17/21  0455   POTASSIUM mmol/L 3 5   CHLORIDE mmol/L 98*   CO2 mmol/L 38*   BUN mg/dL 65*   CREATININE mg/dL 1 70*   CALCIUM mg/dL 8 6   ALK PHOS U/L 91   ALT U/L 76   AST U/L 35     No results found for: TROPONINT      Results from last 7 days   Lab Units 07/14/21 2028   TROPONIN I ng/mL <0 02     Results from last 7 days   Lab Units 07/14/21 2028   INR  1 36*       This note was completed in part utilizing [employer] Fluency Direct Software  Grammatical errors, random word insertions, spelling mistakes, and incomplete sentences may be an occasional consequence of this system secondary to software limitations, ambient noise, and hardware issues    If you have any questions or concerns about the content, text, or information contained within the body of this dictation, please contact the provider for clarification

## 2021-07-17 NOTE — ASSESSMENT & PLAN NOTE
· Likely in the setting of lung cancer however symptoms did improve with diuretics  · Was on IV furosemide but has been transitioned to torsemide by cardiology  · Pulmonology started prednisone with azithromycin for COPD exacerbation    Patient will be discharged with prednisone taper and to use Anoro instead of Symbicort as recommended

## 2021-07-17 NOTE — ASSESSMENT & PLAN NOTE
· BIJAN secondary to volume overload  · Stable with administration of diuretic    · Cardiology recommends holding diuretic this weekend and can start torsemide 20 mg daily starting Monday 7/19/2021    Results from last 7 days   Lab Units 07/17/21  0455 07/16/21  0454 07/15/21  0344 07/14/21 2028   BUN mg/dL 65* 67* 63* 70*   CREATININE mg/dL 1 70* 1 63* 1 56* 1 79*   EGFR ml/min/1 73sq m 36 38 40 34

## 2021-07-17 NOTE — NURSING NOTE
Patient given discharge instructions and prescriptions are to be picked up at pharmacy  Patient left with all belongings

## 2021-07-18 LAB
BACTERIA SPT RESP CULT: ABNORMAL
GRAM STN SPEC: ABNORMAL

## 2021-07-19 NOTE — UTILIZATION REVIEW
Notification of Discharge   This is a Notification of Discharge from our facility [address]  Please be advised that this patient has been discharge from our facility  Below you will find the admission and discharge date and time including the patients disposition  UTILIZATION REVIEW CONTACT:  Liv Solis  Utilization   Network Utilization Review Department  Phone: [phone] x carefully listen to the prompts  All voicemails are confidential   Email: [email or web address]     PHYSICIAN ADVISORY SERVICES:  FOR [id number] REVIEW - MEDICAL NECESSITY DENIAL  Phone: [phone]  Fax: [phone]  Email: [email or web address]     PRESENTATION DATE: 7/14/2021  7:59 PM    INPATIENT ADMISSION DATE: 7/14/21 11:13 PM   DISCHARGE DATE: 7/17/2021  4:53 PM  DISPOSITION: Home/Self Care Home/Self Care      IMPORTANT INFORMATION:  Send all requests for admission clinical reviews, approved or denied determinations and any other requests to dedicated fax number below belonging to the campus where the patient is receiving treatment   List of dedicated fax numbers:  [address] DENIALS (Administrative/Medical Necessity) [phone]   [address]  (Maternity/NICU/Pediatrics) [phone]   [facility] Blackburn [phone]   [facility] [phone]   [facility] [phone]   [address] [phone]   [facility]  [phone]   [address] [facility] [address]  [address] [facility] [address] [facility] [phone]

## 2021-07-30 ENCOUNTER — TELEPHONE (OUTPATIENT)
Dept: CARDIOLOGY CLINIC | Facility: CLINIC | Age: 86
End: 2021-07-30

## 2021-07-30 NOTE — TELEPHONE ENCOUNTER
Wife called was very upset because at treatment today the attending doctor was kind of abrupt with them and stated I see you had heart failure do you want to continue and go down for your treatment   she and her  felt very taken back and said yes    she was wondering about his heart is it too bad to to continue   she states he is so much better since recent stay at [facility]  She would like to talk with Dr Barrington Castillo  Please call her

## 2021-08-01 ENCOUNTER — APPOINTMENT (EMERGENCY)
Dept: RADIOLOGY | Facility: HOSPITAL | Age: 86
DRG: 291 | End: 2021-08-01
Payer: COMMERCIAL

## 2021-08-01 ENCOUNTER — HOSPITAL ENCOUNTER (INPATIENT)
Facility: HOSPITAL | Age: 86
LOS: 5 days | Discharge: HOME HEALTH | DRG: 291 | End: 2021-08-06
Attending: EMERGENCY MEDICINE | Admitting: EMERGENCY MEDICINE
Payer: COMMERCIAL

## 2021-08-01 DIAGNOSIS — I82.431 ACUTE DEEP VEIN THROMBOSIS (DVT) OF POPLITEAL VEIN OF RIGHT LOWER EXTREMITY (HCC): ICD-10-CM

## 2021-08-01 DIAGNOSIS — E87.6 HYPOKALEMIA: ICD-10-CM

## 2021-08-01 DIAGNOSIS — C34.90 LUNG CANCER (HCC): ICD-10-CM

## 2021-08-01 DIAGNOSIS — E43 SEVERE PROTEIN-CALORIE MALNUTRITION (HCC): ICD-10-CM

## 2021-08-01 DIAGNOSIS — I82.431 DEEP VEIN THROMBOSIS (DVT) OF POPLITEAL VEIN OF RIGHT LOWER EXTREMITY, UNSPECIFIED CHRONICITY (HCC): ICD-10-CM

## 2021-08-01 DIAGNOSIS — I50.33 ACUTE ON CHRONIC DIASTOLIC (CONGESTIVE) HEART FAILURE (HCC): ICD-10-CM

## 2021-08-01 DIAGNOSIS — C34.11 MALIGNANT NEOPLASM OF UPPER LOBE OF RIGHT LUNG (HCC): ICD-10-CM

## 2021-08-01 DIAGNOSIS — J96.01 ACUTE RESPIRATORY FAILURE WITH HYPOXIA (HCC): Primary | ICD-10-CM

## 2021-08-01 PROBLEM — L89.159 PRESSURE INJURY OF SKIN OF SACRAL REGION: Status: ACTIVE | Noted: 2021-08-01

## 2021-08-01 LAB
ALBUMIN SERPL BCP-MCNC: 1.8 G/DL (ref 3.5–5)
ALP SERPL-CCNC: 100 U/L (ref 46–116)
ALT SERPL W P-5'-P-CCNC: 151 U/L (ref 12–78)
ANION GAP SERPL CALCULATED.3IONS-SCNC: 8 MMOL/L (ref 4–13)
APTT PPP: 36 SECONDS (ref 23–37)
AST SERPL W P-5'-P-CCNC: 49 U/L (ref 5–45)
ATRIAL RATE: 75 BPM
BASOPHILS # BLD AUTO: 0.04 THOUSANDS/ΜL (ref 0–0.1)
BASOPHILS NFR BLD AUTO: 1 % (ref 0–1)
BILIRUB DIRECT SERPL-MCNC: 0.14 MG/DL (ref 0–0.2)
BILIRUB SERPL-MCNC: 0.33 MG/DL (ref 0.2–1)
BUN SERPL-MCNC: 68 MG/DL (ref 5–25)
CALCIUM SERPL-MCNC: 8.5 MG/DL (ref 8.3–10.1)
CHLORIDE SERPL-SCNC: 98 MMOL/L (ref 100–108)
CO2 SERPL-SCNC: 34 MMOL/L (ref 21–32)
CREAT SERPL-MCNC: 1.76 MG/DL (ref 0.6–1.3)
EOSINOPHIL # BLD AUTO: 0.21 THOUSAND/ΜL (ref 0–0.61)
EOSINOPHIL NFR BLD AUTO: 2 % (ref 0–6)
ERYTHROCYTE [DISTWIDTH] IN BLOOD BY AUTOMATED COUNT: 13.4 % (ref 11.6–15.1)
GFR SERPL CREATININE-BSD FRML MDRD: 35 ML/MIN/1.73SQ M
GLUCOSE SERPL-MCNC: 147 MG/DL (ref 65–140)
HCT VFR BLD AUTO: 32.1 % (ref 36.5–49.3)
HGB BLD-MCNC: 10.7 G/DL (ref 12–17)
IMM GRANULOCYTES # BLD AUTO: 0.05 THOUSAND/UL (ref 0–0.2)
IMM GRANULOCYTES NFR BLD AUTO: 1 % (ref 0–2)
INR PPP: 1.47 (ref 0.84–1.19)
LYMPHOCYTES # BLD AUTO: 0.26 THOUSANDS/ΜL (ref 0.6–4.47)
LYMPHOCYTES NFR BLD AUTO: 3 % (ref 14–44)
MCH RBC QN AUTO: 30.9 PG (ref 26.8–34.3)
MCHC RBC AUTO-ENTMCNC: 33.3 G/DL (ref 31.4–37.4)
MCV RBC AUTO: 93 FL (ref 82–98)
MONOCYTES # BLD AUTO: 0.7 THOUSAND/ΜL (ref 0.17–1.22)
MONOCYTES NFR BLD AUTO: 8 % (ref 4–12)
NEUTROPHILS # BLD AUTO: 7.34 THOUSANDS/ΜL (ref 1.85–7.62)
NEUTS SEG NFR BLD AUTO: 85 % (ref 43–75)
NRBC BLD AUTO-RTO: 0 /100 WBCS
NT-PROBNP SERPL-MCNC: 1618 PG/ML
P AXIS: 64 DEGREES
PLATELET # BLD AUTO: 192 THOUSANDS/UL (ref 149–390)
PLATELET # BLD AUTO: 202 THOUSANDS/UL (ref 149–390)
PMV BLD AUTO: 11.3 FL (ref 8.9–12.7)
PMV BLD AUTO: 11.3 FL (ref 8.9–12.7)
POTASSIUM SERPL-SCNC: 3.4 MMOL/L (ref 3.5–5.3)
PR INTERVAL: 182 MS
PROT SERPL-MCNC: 6.7 G/DL (ref 6.4–8.2)
PROTHROMBIN TIME: 17.5 SECONDS (ref 11.6–14.5)
QRS AXIS: -71 DEGREES
QRSD INTERVAL: 138 MS
QT INTERVAL: 400 MS
QTC INTERVAL: 450 MS
RBC # BLD AUTO: 3.46 MILLION/UL (ref 3.88–5.62)
SODIUM SERPL-SCNC: 140 MMOL/L (ref 136–145)
T WAVE AXIS: 49 DEGREES
TROPONIN I SERPL-MCNC: <0.02 NG/ML
VENTRICULAR RATE: 76 BPM
WBC # BLD AUTO: 8.6 THOUSAND/UL (ref 4.31–10.16)

## 2021-08-01 PROCEDURE — 93005 ELECTROCARDIOGRAM TRACING: CPT

## 2021-08-01 PROCEDURE — 85730 THROMBOPLASTIN TIME PARTIAL: CPT | Performed by: EMERGENCY MEDICINE

## 2021-08-01 PROCEDURE — 99223 1ST HOSP IP/OBS HIGH 75: CPT | Performed by: STUDENT IN AN ORGANIZED HEALTH CARE EDUCATION/TRAINING PROGRAM

## 2021-08-01 PROCEDURE — 80048 BASIC METABOLIC PNL TOTAL CA: CPT | Performed by: EMERGENCY MEDICINE

## 2021-08-01 PROCEDURE — 99285 EMERGENCY DEPT VISIT HI MDM: CPT | Performed by: EMERGENCY MEDICINE

## 2021-08-01 PROCEDURE — 84484 ASSAY OF TROPONIN QUANT: CPT | Performed by: EMERGENCY MEDICINE

## 2021-08-01 PROCEDURE — 71045 X-RAY EXAM CHEST 1 VIEW: CPT

## 2021-08-01 PROCEDURE — 99285 EMERGENCY DEPT VISIT HI MDM: CPT

## 2021-08-01 PROCEDURE — 93010 ELECTROCARDIOGRAM REPORT: CPT

## 2021-08-01 PROCEDURE — 83880 ASSAY OF NATRIURETIC PEPTIDE: CPT | Performed by: EMERGENCY MEDICINE

## 2021-08-01 PROCEDURE — 85025 COMPLETE CBC W/AUTO DIFF WBC: CPT | Performed by: EMERGENCY MEDICINE

## 2021-08-01 PROCEDURE — 85049 AUTOMATED PLATELET COUNT: CPT | Performed by: STUDENT IN AN ORGANIZED HEALTH CARE EDUCATION/TRAINING PROGRAM

## 2021-08-01 PROCEDURE — 36415 COLL VENOUS BLD VENIPUNCTURE: CPT | Performed by: EMERGENCY MEDICINE

## 2021-08-01 PROCEDURE — 85610 PROTHROMBIN TIME: CPT | Performed by: EMERGENCY MEDICINE

## 2021-08-01 PROCEDURE — 80076 HEPATIC FUNCTION PANEL: CPT | Performed by: EMERGENCY MEDICINE

## 2021-08-01 RX ORDER — PRAVASTATIN SODIUM 10 MG
20 TABLET ORAL
Status: DISCONTINUED | OUTPATIENT
Start: 2021-08-01 | End: 2021-08-06 | Stop reason: HOSPADM

## 2021-08-01 RX ORDER — FUROSEMIDE 10 MG/ML
40 INJECTION INTRAMUSCULAR; INTRAVENOUS ONCE
Status: COMPLETED | OUTPATIENT
Start: 2021-08-01 | End: 2021-08-01

## 2021-08-01 RX ORDER — LEVOTHYROXINE SODIUM 0.07 MG/1
75 TABLET ORAL
Status: DISCONTINUED | OUTPATIENT
Start: 2021-08-02 | End: 2021-08-06 | Stop reason: HOSPADM

## 2021-08-01 RX ORDER — AMOXICILLIN 250 MG
1 CAPSULE ORAL
Status: DISCONTINUED | OUTPATIENT
Start: 2021-08-01 | End: 2021-08-06 | Stop reason: HOSPADM

## 2021-08-01 RX ORDER — OXYCODONE HYDROCHLORIDE 10 MG/1
10 TABLET ORAL EVERY 4 HOURS PRN
Status: DISCONTINUED | OUTPATIENT
Start: 2021-08-01 | End: 2021-08-06 | Stop reason: HOSPADM

## 2021-08-01 RX ORDER — ASPIRIN 81 MG/1
81 TABLET, CHEWABLE ORAL DAILY
Status: DISCONTINUED | OUTPATIENT
Start: 2021-08-01 | End: 2021-08-06 | Stop reason: HOSPADM

## 2021-08-01 RX ORDER — MORPHINE SULFATE 15 MG/1
30 TABLET, FILM COATED, EXTENDED RELEASE ORAL EVERY 12 HOURS SCHEDULED
Status: DISCONTINUED | OUTPATIENT
Start: 2021-08-01 | End: 2021-08-06 | Stop reason: HOSPADM

## 2021-08-01 RX ORDER — POTASSIUM CHLORIDE 20 MEQ/1
40 TABLET, EXTENDED RELEASE ORAL ONCE
Status: COMPLETED | OUTPATIENT
Start: 2021-08-01 | End: 2021-08-01

## 2021-08-01 RX ORDER — PRIMIDONE 50 MG/1
50 TABLET ORAL EVERY 12 HOURS SCHEDULED
Status: DISCONTINUED | OUTPATIENT
Start: 2021-08-01 | End: 2021-08-06 | Stop reason: HOSPADM

## 2021-08-01 RX ORDER — ONDANSETRON 2 MG/ML
4 INJECTION INTRAMUSCULAR; INTRAVENOUS EVERY 6 HOURS PRN
Status: DISCONTINUED | OUTPATIENT
Start: 2021-08-01 | End: 2021-08-01

## 2021-08-01 RX ORDER — ALBUTEROL SULFATE 90 UG/1
2 AEROSOL, METERED RESPIRATORY (INHALATION) EVERY 4 HOURS PRN
Status: DISCONTINUED | OUTPATIENT
Start: 2021-08-01 | End: 2021-08-06 | Stop reason: HOSPADM

## 2021-08-01 RX ORDER — FUROSEMIDE 10 MG/ML
40 INJECTION INTRAMUSCULAR; INTRAVENOUS ONCE
Status: DISCONTINUED | OUTPATIENT
Start: 2021-08-01 | End: 2021-08-01

## 2021-08-01 RX ORDER — AMLODIPINE BESYLATE 10 MG/1
10 TABLET ORAL DAILY
Status: DISCONTINUED | OUTPATIENT
Start: 2021-08-02 | End: 2021-08-01

## 2021-08-01 RX ORDER — CARVEDILOL 6.25 MG/1
6.25 TABLET ORAL 2 TIMES DAILY WITH MEALS
Status: DISCONTINUED | OUTPATIENT
Start: 2021-08-01 | End: 2021-08-03

## 2021-08-01 RX ORDER — POLYETHYLENE GLYCOL 3350 17 G/17G
17 POWDER, FOR SOLUTION ORAL DAILY
Status: DISCONTINUED | OUTPATIENT
Start: 2021-08-01 | End: 2021-08-06 | Stop reason: HOSPADM

## 2021-08-01 RX ORDER — POTASSIUM CHLORIDE 750 MG/1
10 TABLET, EXTENDED RELEASE ORAL DAILY
Status: DISCONTINUED | OUTPATIENT
Start: 2021-08-01 | End: 2021-08-03

## 2021-08-01 RX ORDER — PROCHLORPERAZINE MALEATE 5 MG/1
10 TABLET ORAL EVERY 6 HOURS PRN
Status: DISCONTINUED | OUTPATIENT
Start: 2021-08-01 | End: 2021-08-06 | Stop reason: HOSPADM

## 2021-08-01 RX ORDER — HEPARIN SODIUM 5000 [USP'U]/ML
5000 INJECTION, SOLUTION INTRAVENOUS; SUBCUTANEOUS EVERY 8 HOURS SCHEDULED
Status: DISCONTINUED | OUTPATIENT
Start: 2021-08-01 | End: 2021-08-03

## 2021-08-01 RX ORDER — CLONIDINE HYDROCHLORIDE 0.1 MG/1
0.1 TABLET ORAL
Status: DISCONTINUED | OUTPATIENT
Start: 2021-08-01 | End: 2021-08-01

## 2021-08-01 RX ADMIN — FUROSEMIDE 40 MG: 10 INJECTION, SOLUTION INTRAVENOUS at 17:01

## 2021-08-01 RX ADMIN — PRAVASTATIN SODIUM 20 MG: 10 TABLET ORAL at 17:04

## 2021-08-01 RX ADMIN — DOCUSATE SODIUM AND SENNOSIDES 1 TABLET: 8.6; 5 TABLET, FILM COATED ORAL at 20:35

## 2021-08-01 RX ADMIN — SALMETEROL XINAFOATE 1 PUFF: 50 POWDER, METERED ORAL; RESPIRATORY (INHALATION) at 17:15

## 2021-08-01 RX ADMIN — HEPARIN SODIUM 5000 UNITS: 5000 INJECTION INTRAVENOUS; SUBCUTANEOUS at 21:31

## 2021-08-01 RX ADMIN — HEPARIN SODIUM 5000 UNITS: 5000 INJECTION INTRAVENOUS; SUBCUTANEOUS at 17:01

## 2021-08-01 RX ADMIN — POTASSIUM CHLORIDE 40 MEQ: 1500 TABLET, EXTENDED RELEASE ORAL at 17:01

## 2021-08-01 RX ADMIN — MORPHINE SULFATE 30 MG: 15 TABLET, EXTENDED RELEASE ORAL at 20:35

## 2021-08-01 RX ADMIN — PRIMIDONE 50 MG: 50 TABLET ORAL at 20:35

## 2021-08-01 RX ADMIN — TIOTROPIUM BROMIDE 18 MCG: 18 CAPSULE ORAL; RESPIRATORY (INHALATION) at 17:15

## 2021-08-01 RX ADMIN — POLYETHYLENE GLYCOL 3350 17 G: 17 POWDER, FOR SOLUTION ORAL at 20:35

## 2021-08-01 NOTE — ASSESSMENT & PLAN NOTE
Malnutrition Findings:      BMI Findings: Body mass index is 27 29 kg/m²       Continue dysphagia mechanical soft diet, thin liquids and nutritional supplements

## 2021-08-01 NOTE — H&P
[facility]&P- Anna  1935, 82 y o  male MRN: [id number]  Unit/Bed#: E2 -01 Encounter: [id number]  Primary Care Provider: Seble Woodard DO   Date and time admitted to hospital: 8/1/2021 12:39 PM    * Acute on chronic diastolic (congestive) heart failure (HCC)  Assessment & Plan  Wt Readings from Last 3 Encounters:   08/01/21 81 4 kg (179 lb 7 3 oz)   07/16/21 80 2 kg (176 lb 12 9 oz)   07/02/21 84 6 kg (186 lb 6 4 oz)   ·   · 82-year-old male with past history of lung cancer, diastolic heart failure, hypertension presented with shortness of breath  · Patient previously was admitted here several weeks prior with similar complaints  · 2D echo from last month reviewed, normal EF with grade 1 diastolic dysfunction  · ProBNP elevated 1600, similar as previous  · Chest x-ray reviewed, no significant pleural edema or effusions noted  · Physical exam does note +2 pitting of lower extremity  · Weight of 179 lb on admission, wife reports baseline weight of 172-174  · Will give 1 dose of IV Lasix 40mg as patient reports taking his 20 mg of torsemide this morning  · Monitor renal functions  · Fluid restrict, daily weights  · Appreciate cardiology evaluation  · Suspect etiology likely secondary to heart failure versus deconditioning due to [facility] and radiation versus severe protein malnutrition given albumin at 1 8  · PT and OT consulted, family considering rehab      Acute respiratory failure with hypoxia ([address] )  Assessment & Plan  Currently requires 2L NC  Likely 2/2 to diastolic heart failure  Wean as able, may need home o2 evaluation prior to discharge    Pressure injury of skin of sacral region  Assessment & Plan  Wound sacral ulcer present on admission  Wound care consulted    Severe protein-calorie malnutrition ([address] )  Assessment & Plan  Malnutrition Findings:      BMI Findings: Body mass index is 27 29 kg/m²       Continue dysphagia mechanical soft diet, thin liquids and nutritional supplements    Cancer related pain  Assessment & Plan  PD MP reviewed, continue oxycodone and morphine sulfate    Malignant neoplasm of upper lobe of right lung [facility])  Assessment & Plan  Patient has a 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  Follows [facility], receiving palliative chemo on keytruda and radiation  Received 2nd dose was this past weekend    Tremor, essential  Assessment & Plan  Continue primidone    Hypertension  Assessment & Plan  Continue Coreg  Hold Norvasc, clondine due to low blood pressure while diuresing    VTE Prophylaxis: Heparin  / sequential compression device   Code Status: Full Code  POLST: There is no POLST form on file for this patient (pre-hospital)  Discussion with family: Patient    Anticipated Length of Stay:  Patient will be admitted on an Inpatient basis with an anticipated length of stay of 2 midnights  Justification for Hospital Stay: IV diuresis    Total Time for Visit, including Counseling / Coordination of Care: 45 minutes  Greater than 50% of this total time spent on direct patient counseling and coordination of care  Chief Complaint:   SOB    History of Present Illness:    Lashaun Jovel is a 82 y o  male who presents with shortness of breath  Past history of metastatic lung cancer, diastolic heart failure, hypertension and essential tremors  For last 3 days after patient received his 2nd dose of Keytruda for some cancer, he noted that he had worsening shortness of breath  Feels that he is not able to ambulate with a walker as he typically did 1 week ago  He felt that it could be a combination due to his recent [place] and radiation  His wife at bedside reports that he has had poor p o  Intake due to appetite change  He has been compliant with a torsemide 20 mg that was given to him after being seen by Cardiology on last admission 1 month prior    He denies any chest pains, abdominal pain, nausea or vomiting  Review of Systems:    Review of Systems   Constitutional: Positive for activity change and appetite change  Negative for chills and fever  HENT: Negative for congestion, sinus pressure and sore throat  Eyes: Negative for pain and discharge  Respiratory: Positive for shortness of breath  Negative for cough and wheezing  Cardiovascular: Positive for leg swelling  Negative for chest pain and palpitations  Gastrointestinal: Negative for abdominal distention, abdominal pain, blood in stool, diarrhea, nausea and vomiting  Endocrine: Negative for cold intolerance, heat intolerance, polydipsia, polyphagia and polyuria  Genitourinary: Negative for dysuria, frequency, hematuria and urgency  Musculoskeletal: Negative for arthralgias and back pain  Skin: Negative for color change and pallor  Neurological: Negative for seizures, syncope and weakness  Psychiatric/Behavioral: Negative for agitation and confusion  Past Medical and Surgical History:     Past Medical History:   Diagnosis Date    Arthritis     Asthma     Chronic pain disorder     back    Colon polyp     Disease of thyroid gland     hypo    Dystonic tremor     Essential tremor     Hyperlipidemia     Hypertension     Pneumonia     x2    Stroke ([facility] )     unknown and no residual       Past Surgical History:   Procedure Laterality Date    BACK SURGERY      CATARACT EXTRACTION Left     COLONOSCOPY      JOINT REPLACEMENT Left     reverse shoulder    MOHS RECONSTRUCTION N/A 6/3/2020    Procedure: REPAIR MOHS OF THE NOSE;  Surgeon: Moreno Reyes MD;  Location: [address] [place] [address];  Service: Plastics    MN DELAY/SECTN FLAP LID,NOS,EAR,LIP N/A 7/15/2020    Procedure: DIVIDE NASAL FLAP;  Surgeon: Moreno Reyes MD;  Location: [address] [place] [address];  Service: Plastics    ROTATOR CUFF REPAIR      SPINE SURGERY      TONSILLECTOMY         Meds/Allergies:    Prior to Admission medications    Medication Sig Start Date End Date Taking? Authorizing Provider   albuterol (PROVENTIL HFA,VENTOLIN HFA) 90 mcg/act inhaler Inhale 2 puffs every 4 (four) hours as needed  12/16/19   Historical Provider, MD   amLODIPine (NORVASC) 10 mg tablet Take 10 mg by mouth daily  4/21/19   Historical Provider, MD   ASPIRIN 81 PO Take 81 mg by mouth daily     Historical Provider, MD   carvedilol (COREG) 6 25 mg tablet Take 6 25 mg by mouth 2 (two) times a day with meals  4/23/19   Historical Provider, MD   cloNIDine (CATAPRES) 0 1 mg tablet Take 0 1 mg by mouth daily at bedtime  2/22/19   Historical Provider, MD   Flaxseed, Linseed, (FLAXSEED OIL) 1000 MG CAPS Take 1 capsule by mouth daily     Historical Provider, MD   levothyroxine 75 mcg tablet Take 75 mcg by mouth daily     Historical Provider, MD   morphine (MS CONTIN) 30 mg 12 hr tablet TAKE 1 TABLET (30 MG TOTAL) BY MOUTH 2 (TWO) TIMES A DAY   MAX DAILY AMOUNT  60 MG 6/9/21   Historical Provider, MD   Multiple Vitamin (MULTI VITAMIN DAILY PO) Take 1 capsule by mouth daily   Patient not taking: Reported on 7/2/2021    Historical Provider, MD   oxyCODONE (ROXICODONE) 10 MG TABS Take 10 mg by mouth every 4 (four) hours as needed  2/5/19   Historical Provider, MD   polyethylene glycol (MIRALAX) 17 g packet Take 17 g by mouth daily    Historical Provider, MD   potassium chloride (Klor-Con) 10 mEq tablet Take 1 tablet (10 mEq total) by mouth daily 7/17/21   Angle Magana DO   predniSONE 10 mg tablet Days 1-3: 4 tablets daily; Days 4-6: 3 tablets daily; Days 7-9: 2 tablets daily; Days 10-12: 1 tablet daily then stop  Patient not taking: Reported on 8/1/2021 7/17/21   Angle Magana DO   primidone (MYSOLINE) 50 mg tablet Take 1 tablet (50 mg total) by mouth every 12 (twelve) hours 7/17/21   Angle Magana DO   prochlorperazine (COMPAZINE) 10 mg tablet Take 10 mg by mouth every 6 (six) hours as needed for vomiting  6/30/21   Historical Provider, MD   senna-docusate sodium (SENOKOT-S) 8 6-50 mg per tablet Take 1 tablet by mouth daily before dinner    Historical Provider, MD   simvastatin (ZOCOR) 10 mg tablet Take 10 mg by mouth daily at bedtime  3/31/19   Historical Provider, MD   torsemide (DEMADEX) 20 mg tablet Take 1 tablet (20 mg total) by mouth daily (start on 2021) 21   Larry Phan DO   umeclidinium-vilanterol (Anoro Ellipta) 62 5-25 MCG/INH inhaler Inhale 1 puff daily 21  Larry Phan DO     I have reviewed home medications with patient personally  Allergies: Allergies   Allergen Reactions    Lisinopril      Swelling of tongue    Sulfa Antibiotics Other (See Comments)     pancreatitis    Other reaction(s): Other (Please comment)  Pancreas destroy itself    Zestoretic [Lisinopril-Hydrochlorothiazide]      Pancreatitis       Social History:     Marital Status: Unknown   Occupation: Retired  Patient Pre-hospital Living Situation: Home  Patient Pre-hospital Level of Mobility: Ambulatory with Walker  Patient Pre-hospital Diet Restrictions: None  Substance Use History:   Social History     Substance and Sexual Activity   Alcohol Use Yes    Comment: < 1 drink a month     Social History     Tobacco Use   Smoking Status Former Smoker    Types: Cigarettes    Quit date: 1994    Years since quittin 2   Smokeless Tobacco Current User    Types: Chew   Tobacco Comment    currently uses chewing tobacco     Social History     Substance and Sexual Activity   Drug Use Never       Family History:    Family History   Problem Relation Age of Onset    No Known Problems Mother     No Known Problems Father        Physical Exam:     Vitals:   Blood Pressure: 106/58 (21 1630)  Pulse: 74 (21 1533)  Temperature: 97 8 °F (36 6 °C) (21 1533)  Temp Source: Temporal (21 1533)  Respirations: 18 (21 1533)  Weight - Scale: 81 4 kg (179 lb 7 3 oz) (21 1241)  SpO2: 96 % (21 1533)    Physical Exam  Vitals and nursing note reviewed     Constitutional:       Appearance: He is normal weight  He is ill-appearing  HENT:      Head: Normocephalic and atraumatic  Eyes:      General: No scleral icterus  Conjunctiva/sclera: Conjunctivae normal    Cardiovascular:      Rate and Rhythm: Normal rate and regular rhythm  Heart sounds: Normal heart sounds  Pulmonary:      Breath sounds: Normal breath sounds  No wheezing or rhonchi  Abdominal:      General: Bowel sounds are normal  There is no distension  Palpations: Abdomen is soft  Tenderness: There is no abdominal tenderness  Musculoskeletal:         General: Swelling present  Right lower leg: Edema present  Left lower leg: Edema present  Skin:     General: Skin is warm and dry  Neurological:      General: No focal deficit present  Mental Status: He is alert  Mental status is at baseline  Psychiatric:         Mood and Affect: Mood normal          Behavior: Behavior normal          Additional Data:     Lab Results: I have personally reviewed pertinent reports  Results from last 7 days   Lab Units 08/01/21  1253   WBC Thousand/uL 8 60   HEMOGLOBIN g/dL 10 7*   HEMATOCRIT % 32 1*   PLATELETS Thousands/uL 192   NEUTROS PCT % 85*   LYMPHS PCT % 3*   MONOS PCT % 8   EOS PCT % 2     Results from last 7 days   Lab Units 08/01/21  1253   SODIUM mmol/L 140   POTASSIUM mmol/L 3 4*   CHLORIDE mmol/L 98*   CO2 mmol/L 34*   BUN mg/dL 68*   CREATININE mg/dL 1 76*   ANION GAP mmol/L 8   CALCIUM mg/dL 8 5   ALBUMIN g/dL 1 8*   TOTAL BILIRUBIN mg/dL 0 33   ALK PHOS U/L 100   ALT U/L 151*   AST U/L 49*   GLUCOSE RANDOM mg/dL 147*     Results from last 7 days   Lab Units 08/01/21  1253   INR  1 47*                   Imaging: I have personally reviewed pertinent reports        XR chest 1 view portable    (Results Pending)       EKG, Pathology, and Other Studies Reviewed on Admission:   · EKG: SR with PACs, RBBB, HR 76    Allscripts / Epic Records Reviewed: Yes     ** Please Note: This note has been constructed using a voice recognition system   **

## 2021-08-01 NOTE — ASSESSMENT & PLAN NOTE
Currently requires 2L NC  Likely 2/2 to diastolic heart failure  Wean as able, may need home o2 evaluation prior to discharge

## 2021-08-01 NOTE — ASSESSMENT & PLAN NOTE
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

## 2021-08-01 NOTE — ED PROVIDER NOTES
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 on palliative chemotherapy and radiation (last Keytruda yesterday), hypertension, hyperlipidemia who presents with shortness of breath  Patient states that his shortness of breath started last night and became worse throughout the morning  He does admit to a nonproductive cough  Denies any increased weight gain or lower extremity swelling  Does admit to intermittent chest pain  But this is not appear to be exertional   He was recently admitted to the hospital for acute respiratory failure likely related to heart failure verses existing cancer  He did improve on diuretics at that time  He was discharged on torsemide  He is up-to-date on his COVID vaccine  Denies fever/chills, nausea/vomiting, lightheadedness/dizziness, numbness/weakness, headache, change in vision, URI symptoms, neck pain, chest pain, palpitations, back pain, flank pain, abdominal pain, diarrhea, hematochezia, melena, dysuria, hematuria  On arrival, the patient is intermittently hypoxic into the mid 80s  He is currently on 2 L nasal cannula with improvement to the mid 90s  He does not wear oxygen at home  Prior to Admission Medications   Prescriptions Last Dose Informant Patient Reported? Taking?    ASPIRIN 81 PO  Spouse/Significant Other Yes No   Sig: Take 81 mg by mouth daily    Flaxseed, Linseed, (FLAXSEED OIL) 1000 MG CAPS  Spouse/Significant Other Yes No   Sig: Take 1 capsule by mouth daily    Multiple Vitamin (MULTI VITAMIN DAILY PO)  Spouse/Significant Other Yes No   Sig: Take 1 capsule by mouth daily    Patient not taking: Reported on 7/2/2021   albuterol (PROVENTIL HFA,VENTOLIN HFA) 90 mcg/act inhaler  Spouse/Significant Other Yes No   Sig: Inhale 2 puffs every 4 (four) hours as needed    amLODIPine (NORVASC) 10 mg tablet  Spouse/Significant Other Yes No   Sig: Take 10 mg by mouth daily    carvedilol (COREG) 6 25 mg tablet  Spouse/Significant Other Yes No   Sig: Take 6 25 mg by mouth 2 (two) times a day with meals    cloNIDine (CATAPRES) 0 1 mg tablet  Spouse/Significant Other Yes No   Sig: Take 0 1 mg by mouth daily at bedtime    levothyroxine 75 mcg tablet  Spouse/Significant Other Yes No   Sig: Take 75 mcg by mouth daily    morphine (MS CONTIN) 30 mg 12 hr tablet   Yes No   Sig: TAKE 1 TABLET (30 MG TOTAL) BY MOUTH 2 (TWO) TIMES A DAY   MAX DAILY AMOUNT  60 MG   oxyCODONE (ROXICODONE) 10 MG TABS  Spouse/Significant Other Yes No   Sig: Take 10 mg by mouth every 4 (four) hours as needed    polyethylene glycol (MIRALAX) 17 g packet   Yes No   Sig: Take 17 g by mouth daily   potassium chloride (Klor-Con) 10 mEq tablet   No No   Sig: Take 1 tablet (10 mEq total) by mouth daily   predniSONE 10 mg tablet   No No   Sig: Days 1-3: 4 tablets daily; Days 4-6: 3 tablets daily; Days 7-9: 2 tablets daily; Days 10-12: 1 tablet daily then stop   primidone (MYSOLINE) 50 mg tablet   No No   Sig: Take 1 tablet (50 mg total) by mouth every 12 (twelve) hours   prochlorperazine (COMPAZINE) 10 mg tablet   Yes No   Sig: Take 10 mg by mouth every 6 (six) hours as needed for vomiting    senna-docusate sodium (SENOKOT-S) 8 6-50 mg per tablet   Yes No   Sig: Take 1 tablet by mouth daily before dinner   simvastatin (ZOCOR) 10 mg tablet  Spouse/Significant Other Yes No   Sig: Take 10 mg by mouth daily at bedtime    torsemide (DEMADEX) 20 mg tablet   No No   Sig: Take 1 tablet (20 mg total) by mouth daily (start on 7/19/2021)   umeclidinium-vilanterol (Anoro Ellipta) 62 5-25 MCG/INH inhaler   No No   Sig: Inhale 1 puff daily      Facility-Administered Medications: None       Past Medical History:   Diagnosis Date    Arthritis     Asthma     Chronic pain disorder     back    Colon polyp     Disease of thyroid gland     hypo    Dystonic tremor     Essential tremor     Hyperlipidemia     Hypertension     Pneumonia     x2    Stroke ([facility] [address] )     unknown and no residual       Past Surgical History:   Procedure Laterality Date    BACK SURGERY      CATARACT EXTRACTION Left     COLONOSCOPY      JOINT REPLACEMENT Left     reverse shoulder    MOHS RECONSTRUCTION N/A 6/3/2020    Procedure: REPAIR MOHS OF THE NOSE;  Surgeon: Nidia Landrum MD;  Location: [address] [place] [address];  Service: Plastics    ME DELAY/SECTN FLAP LID,NOS,EAR,LIP N/A 7/15/2020    Procedure: DIVIDE NASAL FLAP;  Surgeon: Nidia Landrum MD;  Location: [address] [place] [address];  Service: Plastics    ROTATOR CUFF REPAIR      SPINE SURGERY      TONSILLECTOMY         Family History   Problem Relation Age of Onset    No Known Problems Mother     No Known Problems Father      I have reviewed and agree with the history as documented  E-Cigarette/Vaping    E-Cigarette Use Never User      E-Cigarette/Vaping Substances    Nicotine No     THC No     CBD No     Flavoring No     Other No     Unknown No      Social History     Tobacco Use    Smoking status: Former Smoker     Types: Cigarettes     Quit date: 1994     Years since quittin 2    Smokeless tobacco: Current User     Types: Chew    Tobacco comment: currently uses chewing tobacco   Vaping Use    Vaping Use: Never used   Substance Use Topics    Alcohol use: Yes     Comment: < 1 drink a month    Drug use: Never       Review of Systems   Constitutional: Negative for chills, fatigue and fever  HENT: Negative for rhinorrhea, sore throat and trouble swallowing  Eyes: Negative for photophobia and visual disturbance  Respiratory: Positive for cough and shortness of breath  Negative for chest tightness  Cardiovascular: Negative for chest pain, palpitations and leg swelling     Gastrointestinal: Negative for abdominal pain, blood in stool, diarrhea, nausea and vomiting  Endocrine: Negative for polyuria  Genitourinary: Negative for dysuria, flank pain and hematuria  Musculoskeletal: Negative for back pain and neck pain  Skin: Negative for color change and rash  Allergic/Immunologic: Negative for immunocompromised state  Neurological: Negative for dizziness, weakness, light-headedness, numbness and headaches  All other systems reviewed and are negative  Physical Exam  Physical Exam  Constitutional:       General: He is not in acute distress  Appearance: Normal appearance  He is cachectic  Comments: Chronically ill-appearing  HENT:      Mouth/Throat:      Pharynx: Uvula midline  Eyes:      General: Lids are normal       Conjunctiva/sclera: Conjunctivae normal       Pupils: Pupils are equal, round, and reactive to light  Neck:      Thyroid: No thyroid mass or thyromegaly  Trachea: Trachea normal    Cardiovascular:      Rate and Rhythm: Normal rate and regular rhythm  Pulses: Normal pulses  Heart sounds: Normal heart sounds  No murmur heard  Pulmonary:      Effort: Pulmonary effort is normal       Breath sounds: Rhonchi present  Abdominal:      General: Bowel sounds are normal       Palpations: Abdomen is soft  Tenderness: There is no abdominal tenderness  There is no guarding or rebound  Negative signs include Medel's sign  Musculoskeletal:      Comments: Pitting edema of the bilateral lower extremities (left greater than right)   Skin:     General: Skin is warm and dry  Neurological:      Mental Status: He is alert  Psychiatric:         Speech: Speech normal          Behavior: Behavior normal  Behavior is cooperative  Thought Content:  Thought content normal          Vital Signs  ED Triage Vitals [08/01/21 1241]   Temperature Pulse Respirations Blood Pressure SpO2   98 °F (36 7 °C) 79 18 99/53 94 %      Temp Source Heart Rate Source Patient Position - Orthostatic VS BP Location FiO2 (%)   Oral Monitor Lying Right arm --      Pain Score       --           Vitals:    08/01/21 1241   BP: 99/53   Pulse: 79   Patient Position - Orthostatic VS: Lying         Visual Acuity      ED Medications  Medications - No data to display    Diagnostic Studies  Results Reviewed     Procedure Component Value Units Date/Time    Hepatic function panel [[id number]]  (Abnormal) Collected: 08/01/21 1253    Lab Status: Final result Specimen: Blood from Arm, Left Updated: 08/01/21 1356     Total Bilirubin 0 33 mg/dL      Bilirubin, Direct 0 14 mg/dL      Alkaline Phosphatase 100 U/L      AST 49 U/L       U/L      Total Protein 6 7 g/dL      Albumin 1 8 g/dL     Protime-INR [[id number]]  (Abnormal) Collected: 08/01/21 1253    Lab Status: Final result Specimen: Blood from Arm, Left Updated: 08/01/21 1348     Protime 17 5 seconds      INR 1 47    APTT [[id number]]  (Normal) Collected: 08/01/21 1253    Lab Status: Final result Specimen: Blood from Arm, Left Updated: 08/01/21 1348     PTT 36 seconds     NT-BNP PRO [[id number]]  (Abnormal) Collected: 08/01/21 1253    Lab Status: Final result Specimen: Blood from Arm, Left Updated: 08/01/21 1345     NT-proBNP 1,618 pg/mL     Troponin I [[id number]]  (Normal) Collected: 08/01/21 1253    Lab Status: Final result Specimen: Blood from Arm, Left Updated: 08/01/21 1335     Troponin I <0 02 ng/mL     Basic metabolic panel [[id number]]  (Abnormal) Collected: 08/01/21 1253    Lab Status: Final result Specimen: Blood from Arm, Left Updated: 08/01/21 1332     Sodium 140 mmol/L      Potassium 3 4 mmol/L      Chloride 98 mmol/L      CO2 34 mmol/L      ANION GAP 8 mmol/L      BUN 68 mg/dL      Creatinine 1 76 mg/dL      Glucose 147 mg/dL      Calcium 8 5 mg/dL      eGFR 35 ml/min/1 73sq m     Narrative:      [place] guidelines for Chronic Kidney Disease (CKD):     Stage 1 with normal or high GFR (GFR > 90 mL/min/1 73 square meters)    Stage 2 Mild CKD (GFR = 60-89 mL/min/1 73 square meters)    Stage 3A Moderate CKD (GFR = 45-59 mL/min/1 73 square meters)    Stage 3B Moderate CKD (GFR = 30-44 mL/min/1 73 square meters)    Stage 4 Severe CKD (GFR = 15-29 mL/min/1 73 square meters)    Stage 5 End Stage CKD (GFR <15 mL/min/1 73 square meters)  Note: GFR calculation is accurate only with a steady state creatinine    CBC and differential [[id number]]  (Abnormal) Collected: 08/01/21 1253    Lab Status: Final result Specimen: Blood from Arm, Left Updated: 08/01/21 1316     WBC 8 60 Thousand/uL      RBC 3 46 Million/uL      Hemoglobin 10 7 g/dL      Hematocrit 32 1 %      MCV 93 fL      MCH 30 9 pg      MCHC 33 3 g/dL      RDW 13 4 %      MPV 11 3 fL      Platelets 487 Thousands/uL      nRBC 0 /100 WBCs      Neutrophils Relative 85 %      Immat GRANS % 1 %      Lymphocytes Relative 3 %      Monocytes Relative 8 %      Eosinophils Relative 2 %      Basophils Relative 1 %      Neutrophils Absolute 7 34 Thousands/µL      Immature Grans Absolute 0 05 Thousand/uL      Lymphocytes Absolute 0 26 Thousands/µL      Monocytes Absolute 0 70 Thousand/µL      Eosinophils Absolute 0 21 Thousand/µL      Basophils Absolute 0 04 Thousands/µL                  XR chest 1 view portable    (Results Pending)              Procedures  ECG 12 Lead Documentation Only    Date/Time: 8/1/2021 1:22 PM  Performed by: Dutch Abarca MD  Authorized by: Dutch Abarca MD     ECG reviewed by me, the ED Provider: yes    Patient location:  ED  Previous ECG:     Previous ECG:  Compared to current    Comparison ECG info:  7/14/21    Similarity:  No change    Comparison to cardiac monitor: Yes    Interpretation:     Interpretation: non-specific    Rate:     ECG rate:  76    ECG rate assessment: normal    Rhythm:     Rhythm: sinus rhythm    Ectopy:     Ectopy: PAC    QRS:     QRS axis:  Left    QRS intervals:  Normal  Conduction:     Conduction: abnormal      Abnormal conduction: complete RBBB    ST segments:     ST segments:  Normal  T waves:     T waves: normal               ED Course  ED Course as of Aug 01 1432   Sun Aug 01, 2021   1339 stable   CO2(!): 34   1350 stable   NT-proBNP(!): 1,618                             SBIRT 20yo+      Most Recent Value   SBIRT (23 yo +)   In order to provide better care to our patients, we are screening all of our patients for alcohol and drug use  Would it be okay to ask you these screening questions? No Filed at: 08/01/2021 1310                    MDM  Number of Diagnoses or Management Options  Diagnosis management comments: Will check labs, EKG, chest x-ray  Patient will require admission  Disposition  Final diagnoses:   Acute respiratory failure with hypoxia ([address] )   Lung cancer ([facility] [address] )     Time reflects when diagnosis was documented in both MDM as applicable and the Disposition within this note     Time User Action Codes Description Comment    8/1/2021  2:00 PM Arely Jacobs Add [J96 01] Acute respiratory failure with hypoxia ([facility] [address] )     8/1/2021  2:01 PM Suman LUQUE Add [C34 90] Lung cancer [facility])       ED Disposition     ED Disposition Condition Date/Time Comment    Admit Stable Sun Aug 1, 2021  2:00 PM         Follow-up Information    None         Patient's Medications   Discharge Prescriptions    No medications on file     No discharge procedures on file      PDMP Review       Value Time User    PDMP Reviewed  Yes 7/14/2021 11:34 PM Jodi Wang          ED Provider  Electronically Signed by           Anderson May MD  08/01/21 [phone]

## 2021-08-01 NOTE — PLAN OF CARE
Problem: Potential for Falls  Goal: Patient will remain free of falls  Description: INTERVENTIONS:  - Educate patient/family on patient safety including physical limitations  - Instruct patient to call for assistance with activity   - Consult OT/PT to assist with strengthening/mobility   - Keep Call bell within reach  - Keep bed low and locked with side rails adjusted as appropriate  - Keep care items and personal belongings within reach  - Initiate and maintain comfort rounds  - Make Fall Risk Sign visible to staff  - Offer Toileting every 2Hours, in advance of need  - Initiate/Maintain bed/chair alarm  Problem: Nutrition/Hydration-ADULT  Goal: Nutrient/Hydration intake appropriate for improving, restoring or maintaining nutritional needs  Description: Monitor and assess patient's nutrition/hydration status for malnutrition  Collaborate with interdisciplinary team and initiate plan and interventions as ordered  Monitor patient's weight and dietary intake as ordered or per policy  Utilize nutrition screening tool and intervene as necessary  Determine patient's food preferences and provide high-protein, high-caloric foods as appropriate       INTERVENTIONS:  - Monitor oral intake, urinary output, labs, and treatment plans  - Assess nutrition and hydration status and recommend course of action  - Evaluate amount of meals eaten  - Assist patient with eating if necessary   - Allow adequate time for meals  - Recommend/ encourage appropriate diets, oral nutritional supplements, and vitamin/mineral supplements  - Order, calculate, and assess calorie counts as needed  - Recommend, monitor, and adjust tube feedings and TPN/PPN based on assessed needs  - Assess need for intravenous fluids  - Provide specific nutrition/hydration education as appropriate  - Include patient/family/caregiver in decisions related to nutrition  Outcome: Progressing     Problem: MOBILITY - ADULT  Goal: Maintain or return to baseline ADL function  Description: INTERVENTIONS:  -  Assess patient's ability to carry out ADLs; assess patient's baseline for ADL function and identify physical deficits which impact ability to perform ADLs (bathing, care of mouth/teeth, toileting, grooming, dressing, etc )  - Assess/evaluate cause of self-care deficits   - Assess range of motion  - Assess patient's mobility; develop plan if impaired  - Assess patient's need for assistive devices and provide as appropriate  - Encourage maximum independence but intervene and supervise when necessary  - Involve family in performance of ADLs  - Assess for home care needs following discharge   - Consider OT consult to assist with ADL evaluation and planning for discharge  - Provide patient education as appropriate  Outcome: Progressing  Goal: Maintains/Returns to pre admission functional level  Description: INTERVENTIONS:  - Perform BMAT or MOVE assessment daily    - Set and communicate daily mobility goal to care team and patient/family/caregiver  - Collaborate with rehabilitation services on mobility goals if consulted  - Perform Range of Motion 3 times a day  - Reposition patient every 2ours    - Dangle patient 3 times a day  - Stand patient 3 times a day  - Ambulate patient 3 times a day  - Out of bed to chair 3 times a day   - Out of bed for meals 3  Problem: Prexisting or High Potential for Compromised Skin Integrity  Goal: Skin integrity is maintained or improved  Description: INTERVENTIONS:  - Identify patients at risk for skin breakdown  - Assess and monitor skin integrity  - Assess and monitor nutrition and hydration status  - Monitor labs   - Assess for incontinence   - Turn and reposition patient  - Assist with mobility/ambulation  - Relieve pressure over bony prominences  - Avoid friction and shearing  - Provide appropriate hygiene as needed including keeping skin clean and dry  - Evaluate need for skin moisturizer/barrier cream  - Collaborate with interdisciplinary team   - Patient/family teaching  - Consider wound care consult   Outcome: Progressing     Problem: SKIN/TISSUE INTEGRITY - ADULT  Goal: Skin Integrity remains intact(Skin Breakdown Prevention)  Description: Assess:  -Perform Gurinder assessment every day  -Clean and moisturize skin every 2hrs  -Inspect skin when repositioning, toileting, and assisting with ADLS  -Assess extremities for adequate circulation and sensation     Bed Management:  -Have minimal linens on bed & keep smooth, unwrinkled  -Change linens as needed when moist or perspiring  -Avoid sitting or lying in one position for more than 2hours while in bed      Toileting:  -Offer bedside commode  -Assess for incontinence [id number]      Activity:  -Mobilize patient 3 times a day  -Encourage activity and walks on unit  -Encourage or provide ROM exercises   -Turn and reposition patient every 2 Hours  -Use appropriate equipment to lift or move patient in bed  -Instruct/ Assist with weight shifting every 2 when out of bed in chair  -Consider limitation of chair time 1 hour intervals    Skin Care:  -Avoid use of baby powder, tape, friction and shearing, hot water or constrictive clothing  -Relieve pressure over bony prominences using cushions/foam wedges  -Do not massage red bony areas    Outcome: Progressing  Goal: Incision(s), wounds(s) or drain site(s) healing without S/S of infection  Description: INTERVENTIONS  - Assess and document dressing, incision, wound bed, drain sites and surrounding tissue  - Provide patient and family education  - Perform skin care/dressing changes every day  Outcome: Progressing  Goal: Pressure injury heals and does not worsen  Description: Interventions:  - Implement low air loss mattress or specialty surface (Criteria met)  - Apply silicone foam dressing  - Instruct/assist with weight shifting every 30 minutes when in chair   - Limit chair time to 1 hour intervals  - Use special pressure reducing interventions such as cushion when in chair   - Apply fecal or urinary incontinence containment device   - Perform passive or active ROM every 2  - Turn and reposition patient & offload bony prominences every 2hours   - Utilize friction reducing device or surface for transfers   - Consider nutrition services referral as needed  Outcome: Progressing    times a day  - Out of bed for toileting  - Record patient progress and toleration of activity level   Outcome: Progressing     - Apply yellow socks and bracelet for high fall risk patients  - Consider moving patient to room near nurses station  Outcome: Progressing

## 2021-08-02 ENCOUNTER — APPOINTMENT (INPATIENT)
Dept: NON INVASIVE DIAGNOSTICS | Facility: HOSPITAL | Age: 86
DRG: 291 | End: 2021-08-02
Payer: COMMERCIAL

## 2021-08-02 LAB
ANION GAP SERPL CALCULATED.3IONS-SCNC: 8 MMOL/L (ref 4–13)
BASOPHILS # BLD AUTO: 0.03 THOUSANDS/ΜL (ref 0–0.1)
BASOPHILS NFR BLD AUTO: 0 % (ref 0–1)
BUN SERPL-MCNC: 65 MG/DL (ref 5–25)
CALCIUM SERPL-MCNC: 9.1 MG/DL (ref 8.3–10.1)
CHLORIDE SERPL-SCNC: 98 MMOL/L (ref 100–108)
CO2 SERPL-SCNC: 32 MMOL/L (ref 21–32)
CREAT SERPL-MCNC: 1.58 MG/DL (ref 0.6–1.3)
EOSINOPHIL # BLD AUTO: 0.17 THOUSAND/ΜL (ref 0–0.61)
EOSINOPHIL NFR BLD AUTO: 2 % (ref 0–6)
ERYTHROCYTE [DISTWIDTH] IN BLOOD BY AUTOMATED COUNT: 13.2 % (ref 11.6–15.1)
GFR SERPL CREATININE-BSD FRML MDRD: 40 ML/MIN/1.73SQ M
GLUCOSE SERPL-MCNC: 182 MG/DL (ref 65–140)
HCT VFR BLD AUTO: 35 % (ref 36.5–49.3)
HGB BLD-MCNC: 11.5 G/DL (ref 12–17)
IMM GRANULOCYTES # BLD AUTO: 0.05 THOUSAND/UL (ref 0–0.2)
IMM GRANULOCYTES NFR BLD AUTO: 1 % (ref 0–2)
LYMPHOCYTES # BLD AUTO: 0.26 THOUSANDS/ΜL (ref 0.6–4.47)
LYMPHOCYTES NFR BLD AUTO: 4 % (ref 14–44)
MAGNESIUM SERPL-MCNC: 2.4 MG/DL (ref 1.6–2.6)
MCH RBC QN AUTO: 31.1 PG (ref 26.8–34.3)
MCHC RBC AUTO-ENTMCNC: 32.9 G/DL (ref 31.4–37.4)
MCV RBC AUTO: 95 FL (ref 82–98)
MONOCYTES # BLD AUTO: 0.59 THOUSAND/ΜL (ref 0.17–1.22)
MONOCYTES NFR BLD AUTO: 8 % (ref 4–12)
NEUTROPHILS # BLD AUTO: 6.04 THOUSANDS/ΜL (ref 1.85–7.62)
NEUTS SEG NFR BLD AUTO: 85 % (ref 43–75)
NRBC BLD AUTO-RTO: 0 /100 WBCS
PLATELET # BLD AUTO: 195 THOUSANDS/UL (ref 149–390)
PMV BLD AUTO: 11.3 FL (ref 8.9–12.7)
POTASSIUM SERPL-SCNC: 4.2 MMOL/L (ref 3.5–5.3)
RBC # BLD AUTO: 3.7 MILLION/UL (ref 3.88–5.62)
SODIUM SERPL-SCNC: 138 MMOL/L (ref 136–145)
WBC # BLD AUTO: 7.14 THOUSAND/UL (ref 4.31–10.16)

## 2021-08-02 PROCEDURE — 99232 SBSQ HOSP IP/OBS MODERATE 35: CPT | Performed by: INTERNAL MEDICINE

## 2021-08-02 PROCEDURE — 97167 OT EVAL HIGH COMPLEX 60 MIN: CPT

## 2021-08-02 PROCEDURE — 80048 BASIC METABOLIC PNL TOTAL CA: CPT | Performed by: STUDENT IN AN ORGANIZED HEALTH CARE EDUCATION/TRAINING PROGRAM

## 2021-08-02 PROCEDURE — 97163 PT EVAL HIGH COMPLEX 45 MIN: CPT

## 2021-08-02 PROCEDURE — 93970 EXTREMITY STUDY: CPT | Performed by: SURGERY

## 2021-08-02 PROCEDURE — 93970 EXTREMITY STUDY: CPT

## 2021-08-02 PROCEDURE — 83735 ASSAY OF MAGNESIUM: CPT | Performed by: STUDENT IN AN ORGANIZED HEALTH CARE EDUCATION/TRAINING PROGRAM

## 2021-08-02 PROCEDURE — 85025 COMPLETE CBC W/AUTO DIFF WBC: CPT | Performed by: STUDENT IN AN ORGANIZED HEALTH CARE EDUCATION/TRAINING PROGRAM

## 2021-08-02 PROCEDURE — 99222 1ST HOSP IP/OBS MODERATE 55: CPT | Performed by: INTERNAL MEDICINE

## 2021-08-02 RX ORDER — METOLAZONE 2.5 MG/1
2.5 TABLET ORAL ONCE
Status: DISCONTINUED | OUTPATIENT
Start: 2021-08-02 | End: 2021-08-02

## 2021-08-02 RX ORDER — FUROSEMIDE 10 MG/ML
40 INJECTION INTRAMUSCULAR; INTRAVENOUS ONCE
Status: COMPLETED | OUTPATIENT
Start: 2021-08-02 | End: 2021-08-02

## 2021-08-02 RX ADMIN — FUROSEMIDE 40 MG: 10 INJECTION, SOLUTION INTRAVENOUS at 11:31

## 2021-08-02 RX ADMIN — PRAVASTATIN SODIUM 20 MG: 10 TABLET ORAL at 18:15

## 2021-08-02 RX ADMIN — TIOTROPIUM BROMIDE 18 MCG: 18 CAPSULE ORAL; RESPIRATORY (INHALATION) at 08:38

## 2021-08-02 RX ADMIN — CARVEDILOL 6.25 MG: 6.25 TABLET, FILM COATED ORAL at 08:32

## 2021-08-02 RX ADMIN — POLYETHYLENE GLYCOL 3350 17 G: 17 POWDER, FOR SOLUTION ORAL at 08:37

## 2021-08-02 RX ADMIN — MORPHINE SULFATE 30 MG: 15 TABLET, EXTENDED RELEASE ORAL at 22:04

## 2021-08-02 RX ADMIN — PRIMIDONE 50 MG: 50 TABLET ORAL at 22:04

## 2021-08-02 RX ADMIN — HEPARIN SODIUM 5000 UNITS: 5000 INJECTION INTRAVENOUS; SUBCUTANEOUS at 22:04

## 2021-08-02 RX ADMIN — LEVOTHYROXINE SODIUM 75 MCG: 75 TABLET ORAL at 05:34

## 2021-08-02 RX ADMIN — SALMETEROL XINAFOATE 1 PUFF: 50 POWDER, METERED ORAL; RESPIRATORY (INHALATION) at 18:16

## 2021-08-02 RX ADMIN — SALMETEROL XINAFOATE 1 PUFF: 50 POWDER, METERED ORAL; RESPIRATORY (INHALATION) at 08:39

## 2021-08-02 RX ADMIN — HEPARIN SODIUM 5000 UNITS: 5000 INJECTION INTRAVENOUS; SUBCUTANEOUS at 05:34

## 2021-08-02 RX ADMIN — PRIMIDONE 50 MG: 50 TABLET ORAL at 08:47

## 2021-08-02 RX ADMIN — ASPIRIN 81 MG CHEWABLE TABLET 81 MG: 81 TABLET CHEWABLE at 08:37

## 2021-08-02 RX ADMIN — MORPHINE SULFATE 30 MG: 15 TABLET, EXTENDED RELEASE ORAL at 08:34

## 2021-08-02 RX ADMIN — DOCUSATE SODIUM AND SENNOSIDES 1 TABLET: 8.6; 5 TABLET, FILM COATED ORAL at 18:15

## 2021-08-02 RX ADMIN — HEPARIN SODIUM 5000 UNITS: 5000 INJECTION INTRAVENOUS; SUBCUTANEOUS at 13:50

## 2021-08-02 RX ADMIN — POTASSIUM CHLORIDE 10 MEQ: 750 TABLET, EXTENDED RELEASE ORAL at 08:34

## 2021-08-02 RX ADMIN — CARVEDILOL 6.25 MG: 6.25 TABLET, FILM COATED ORAL at 18:15

## 2021-08-02 NOTE — UTILIZATION REVIEW
Initial Clinical Review    Admission: Date/Time/Statement:   Admission Orders (From admission, onward)     Ordered        08/01/21 1432  Inpatient Admission  Once                   Orders Placed This Encounter   Procedures    Inpatient Admission     Standing Status:   Standing     Number of Occurrences:   1     Order Specific Question:   Level of Care     Answer:   Med Surg [16]     Order Specific Question:   Estimated length of stay     Answer:   More than 2 Midnights     Order Specific Question:   Certification     Answer:   I certify that inpatient services are medically necessary for this patient for a duration of greater than two midnights  See H&P and MD Progress Notes for additional information about the patient's course of treatment  ED Arrival Information     Expected Arrival Acuity    - 8/1/2021 12:39 Urgent         Means of arrival Escorted by Service Admission type    Ambulance [facility] EMS Hospitalist Urgent         Arrival complaint    chest pain        Chief Complaint   Patient presents with    Chest Pain     Pt coming by EMS from home with SOB and R sided CP that began this am around 09:00  Given 324 Aspirin pta by EMS  Hx lung CA stg 4 with brain mets       Initial Presentation:   82  Y O male presents to ED via  EMS  From home  With shortness  Of breath for past  3 days after receiving 2nd dose  Of Keytruda  HAS  Not even been able to ambulate with a walker  PO intake poor  PMH  Is  Metastatic lung cancer, diastolic heart failure, HTN and essential tremors  Recently prescribed torsemide and has been compliant  CXR unremarkable  Labs  Reveal BNP  1600  Weight  179, baseline  172 - 174  Admit  Ip with Acute/chronic diastolic congestive heart failure, acute respiratory failure with hypoxia and plan is  O2 2L,  PT/OT,  Monitor labs,  Fluid restrict, cardiology consult, daily weight and continue home meds  Date:    8/2           Day 2:   Continue  PT/OT  Continue  IV lasix  Daily  Renal function somewhat improved  Monitor daily weight  Monitor  Respiratory status, currently on RA  States he is still short of breath on  Exertion,  Sat stable on  RA>   Continue current treatment  ED Triage Vitals   Temperature Pulse Respirations Blood Pressure SpO2   08/01/21 1241 08/01/21 1241 08/01/21 1241 08/01/21 1241 08/01/21 1241   98 °F (36 7 °C) 79 18 99/53 94 %      Temp Source Heart Rate Source Patient Position - Orthostatic VS BP Location FiO2 (%)   08/01/21 1241 08/01/21 1241 08/01/21 1241 08/01/21 1241 --   Oral Monitor Lying Right arm       Pain Score       08/01/21 1716       No Pain          Wt Readings from Last 1 Encounters:   08/02/21 77 5 kg (170 lb 13 7 oz)     Additional Vital Signs:   08/02/21 0700  98 2 °F (36 8 °C)  90  18  136/60  --  95 %  --  --  None (Room air)  Sitting   08/01/21 2224  98 7 °F (37 1 °C)  89  18  114/54  71  96 %  28  2 L/min  Nasal cannula  Lying   08/01/21 1848  97 °F (36 1 °C)Abnormal   86  18  145/61  95  94 %  --  --  None (Room air)  Lying   08/01/21 1745  --  --  --  --  --  93 %  --  --  None (Room air)  --   08/01/21 1630  --  --  --  106/58  --  --  --  --  --  Lying   08/01/21 1533  97 8 °F (36 6 °C)  74  18  97/45Abnormal   65  96 %  28  2 L/min  Nasal cannula  Lying   08/01/21 1430  --  86  18  133/60  87  97 %  --  --  None (Room air)  Lying   08/01/21 1241  98 °F (36 7 °C)  79  18  99/53  --  94 %  --  --  None (Room air)           Pertinent Labs/Diagnostic Test Results:   CXR    ( 8/2)    Right upper lobe mass extending towards the right hilum in keeping with history of cancer  2   Prominence of interstitial markings suspicious for pulmonary interstitial edema   Lymphangitic spread of disease is also in the differential but is less likely given bilateral findings       EKG     NSR    Complete  RBBB   Left  QRS     HR  76     PAC      Results from last 7 days   Lab Units 08/02/21  0619 08/01/21  1659 08/01/21  1253   WBC Thousand/uL 7 14  -- 8  60   HEMOGLOBIN g/dL 11 5*  --  10 7*   HEMATOCRIT % 35 0*  --  32 1*   PLATELETS Thousands/uL 195 202 192   NEUTROS ABS Thousands/µL 6 04  --  7 34         Results from last 7 days   Lab Units 08/02/21  0619 08/01/21  1253   SODIUM mmol/L 138 140   POTASSIUM mmol/L 4 2 3 4*   CHLORIDE mmol/L 98* 98*   CO2 mmol/L 32 34*   ANION GAP mmol/L 8 8   BUN mg/dL 65* 68*   CREATININE mg/dL 1 58* 1 76*   EGFR ml/min/1 73sq m 40 35   CALCIUM mg/dL 9 1 8 5   MAGNESIUM mg/dL 2 4  --      Results from last 7 days   Lab Units 08/01/21  1253   AST U/L 49*   ALT U/L 151*   ALK PHOS U/L 100   TOTAL PROTEIN g/dL 6 7   ALBUMIN g/dL 1 8*   TOTAL BILIRUBIN mg/dL 0 33   BILIRUBIN DIRECT mg/dL 0 14         Results from last 7 days   Lab Units 08/02/21  0619 08/01/21  1253   GLUCOSE RANDOM mg/dL 182* 147*           Results from last 7 days   Lab Units 08/01/21  1253   TROPONIN I ng/mL <0 02         Results from last 7 days   Lab Units 08/01/21  1253   PROTIME seconds 17 5*   INR  1 47*   PTT seconds 36           Results from last 7 days   Lab Units 08/01/21  1253   NT-PRO BNP pg/mL 1,618*         Present on Admission:   Malignant neoplasm of upper lobe of right lung (HCC)   Hypertension   Cancer related pain   Tremor, essential   Severe protein-calorie malnutrition (HCC)      Admitting Diagnosis: [facility] [C34 90]  Chest pain [R07 9]  Acute respiratory failure with hypoxia (HCC) [J96 01]  Age/Sex: 82 y o  male  Admission Orders:  Scheduled Medications:  aspirin, 81 mg, Oral, Daily  carvedilol, 6 25 mg, Oral, BID With Meals  heparin (porcine), 5,000 Units, Subcutaneous, Q8H MICHAEL  levothyroxine, 75 mcg, Oral, Early Morning  morphine, 30 mg, Oral, Q12H MICHAEL  polyethylene glycol, 17 g, Oral, Daily  potassium chloride, 10 mEq, Oral, Daily  pravastatin, 20 mg, Oral, Daily With Dinner  primidone, 50 mg, Oral, Q12H MICHAEL  tiotropium, 18 mcg, Inhalation, Daily   And  salmeterol, 1 puff, Inhalation, BID  senna-docusate sodium, 1 tablet, Oral, Before Dinner      Continuous IV Infusions:     PRN Meds:  albuterol, 2 puff, Inhalation, Q4H PRN  oxyCODONE, 10 mg, Oral, Q4H PRN  prochlorperazine, 10 mg, Oral, Q6H PRN        IP CONSULT TO NUTRITION SERVICES  IP CONSULT TO CARDIOLOGY     50  Hr  tele    Network Utilization Review Department  ATTENTION: Please call with any questions or concerns to [phone] and carefully listen to the prompts so that you are directed to the right person  All voicemails are confidential   Thomas Carrera all requests for admission clinical reviews, approved or denied determinations and any other requests to dedicated fax number below belonging to the campus where the patient is receiving treatment   List of dedicated fax numbers for the Facilities:  [address] DENIALS (Administrative/Medical Necessity) [phone]   [address] (Maternity/NICU/Pediatrics) [phone]   [address] Dayron [place] 1474 [address] [place] [address] [place] Kathleen Álvarez [address] [facility] [facility] [phone]

## 2021-08-02 NOTE — PLAN OF CARE
Problem: Potential for Falls  Goal: Patient will remain free of falls  Description: INTERVENTIONS:  - Educate patient/family on patient safety including physical limitations  - Instruct patient to call for assistance with activity   - Consult OT/PT to assist with strengthening/mobility   - Keep Call bell within reach  - Keep bed low and locked with side rails adjusted as appropriate  - Keep care items and personal belongings within reach  - Initiate and maintain comfort rounds  - Make Fall Risk Sign visible to staff  - Apply yellow socks and bracelet for high fall risk patients  - Consider moving patient to room near nurses station  8/2/2021 1043 by Zuleima Vega RN  Outcome: Progressing  8/2/2021 1043 by Zuleima Vega RN  Outcome: Progressing     Problem: MOBILITY - ADULT  Goal: Maintain or return to baseline ADL function  Description: INTERVENTIONS:  -  Assess patient's ability to carry out ADLs; assess patient's baseline for ADL function and identify physical deficits which impact ability to perform ADLs (bathing, care of mouth/teeth, toileting, grooming, dressing, etc )  - Assess/evaluate cause of self-care deficits   - Assess range of motion  - Assess patient's mobility; develop plan if impaired  - Assess patient's need for assistive devices and provide as appropriate  - Encourage maximum independence but intervene and supervise when necessary  - Involve family in performance of ADLs  - Assess for home care needs following discharge   - Consider OT consult to assist with ADL evaluation and planning for discharge  - Provide patient education as appropriate  8/2/2021 1043 by Zuleima Vega RN  Outcome: Progressing  8/2/2021 1043 by Zuleima Vega RN  Outcome: Progressing  Goal: Maintains/Returns to pre admission functional level  Description: INTERVENTIONS:  - Perform BMAT or MOVE assessment daily    - Set and communicate daily mobility goal to care team and patient/family/caregiver     - Collaborate with rehabilitation services on mobility goals if consulted  - Out of bed for toileting  - Record patient progress and toleration of activity level   8/2/2021 1043 by Trever Valdez RN  Outcome: Progressing  8/2/2021 1043 by Trever Valdez RN  Outcome: Progressing     Problem: Prexisting or High Potential for Compromised Skin Integrity  Goal: Skin integrity is maintained or improved  Description: INTERVENTIONS:  - Identify patients at risk for skin breakdown  - Assess and monitor skin integrity  - Assess and monitor nutrition and hydration status  - Monitor labs   - Assess for incontinence   - Turn and reposition patient  - Assist with mobility/ambulation  - Relieve pressure over bony prominences  - Avoid friction and shearing  - Provide appropriate hygiene as needed including keeping skin clean and dry  - Evaluate need for skin moisturizer/barrier cream  - Collaborate with interdisciplinary team   - Patient/family teaching  - Consider wound care consult   8/2/2021 1043 by Trever Valdez RN  Outcome: Progressing  8/2/2021 1043 by Trever Valdez RN  Outcome: Progressing

## 2021-08-02 NOTE — PLAN OF CARE
Problem: Nutrition/Hydration-ADULT  Goal: Nutrient/Hydration intake appropriate for improving, restoring or maintaining nutritional needs  Description: Monitor and assess patient's nutrition/hydration status for malnutrition  Collaborate with interdisciplinary team and initiate plan and interventions as ordered  Monitor patient's weight and dietary intake as ordered or per policy  Utilize nutrition screening tool and intervene as necessary  Determine patient's food preferences and provide high-protein, high-caloric foods as appropriate       INTERVENTIONS:  - Monitor oral intake, urinary output, labs, and treatment plans  - Assess nutrition and hydration status and recommend course of action  - Evaluate amount of meals eaten  - Assist patient with eating if necessary   - Allow adequate time for meals  - Recommend/ encourage appropriate diets, oral nutritional supplements, and vitamin/mineral supplements  - Order, calculate, and assess calorie counts as needed  - Recommend, monitor, and adjust tube feedings and TPN/PPN based on assessed needs  - Assess need for intravenous fluids  - Provide specific nutrition/hydration education as appropriate  - Include patient/family/caregiver in decisions related to nutrition  Outcome: Not Progressing   Poor po intake

## 2021-08-02 NOTE — WOUND OSTOMY CARE
Consult Note - Wound   Jennifer Montes 82 y o  male MRN: [id number]  Unit/Bed#: E2 -01 Encounter: [id number]      History and Present Illness:  82year old male presented to the hospital with shortness of breath and right chest pain  Patient's history significant for  Stage 4 lung cancer with metastasis and palliative chemotherapy and radiation  Assessment Findings:   Patient agreeable to assessment  Patient back to bed from chair with assist x 1 and walker  Continent of bowel and bladder  Bilateral heels intact with preventative foam dressings in place  Accumax alternating pump and chair cushion in place  Blanchable erythema to bilateral buttocks and sacrum  Dietary supplements ordered  1   Present on admission pressure injury to sacrum/left medial buttock--wound originally documented on a previous hospital admission as an evolving deep tissue injury  Presenting today as unstageable  Scattered open areas measured together extending from mid sacrum along gluteal cleft to left medial buttock  Wound beds with 95% yellow slough and 5% pink  Brea-wound significantly macerated with blanchable erythema  No induration at this time  Patient reports wound pain when sitting up  2   Left forehead--wound dry, approximated with sutures, open to air  OK to leave open to air  Encouraged patient to offload pressure on his wound by laying on his side as often as possible  He verbalized understanding and laid on his left side after assessment  See flowsheet and media for wound details  No evidence of wound infection at this time  Wound Care Plan:   1-Apply Allevyn Life foam dressings to bilateral heels for prevention  Change dressings every 3 days  2-Elevate heels off of bed/chair surface to offload pressure  3-Offloading air cushion in chair when out of bed    4-Moisturize skin daily with skin nourishing cream   5-Turn/reposition every 2 hours while in bed, or when medically stable, and weight shift frequently while in chair for pressure re-distribution on skin  6-P500 low air-loss mattress  7-Sacrum--cleanse with soap and water, pat dry  Apply Triad paste to open wound bed and cover with Allevyn Life foam dressing  Change dressing every other day and as needed with soilage  Wound care team to follow  Plan of care reviewed with primary RN  Patient should be discharged with VNA and follow-up at the wound center  Wound 07/15/21 Pressure Injury Buttocks Inner (Active)   Wound Image   08/02/21 1126   Wound Description Slough; Yellow;Pink 08/02/21 1126   Pressure Injury Stage U 08/02/21 1126   Brea-wound Assessment Erythema; Maceration 08/02/21 1126   Wound Length (cm) 6 1 cm 08/02/21 1126   Wound Width (cm) 2 cm 08/02/21 1126   Wound Depth (cm) 0 2 cm 08/02/21 1126   Wound Surface Area (cm^2) 12 2 cm^2 08/02/21 1126   Wound Volume (cm^3) 2 44 cm^3 08/02/21 1126   Calculated Wound Volume (cm^3) 2 44 cm^3 08/02/21 1126   Change in Wound Size % 67 89 08/02/21 1126   Drainage Amount Small 08/02/21 1126   Drainage Description Serous 08/02/21 1126   Non-staged Wound Description Not applicable 08/75/01 2471   Treatments Cleansed 08/02/21 1126   Dressing Calcium Alginate with Silver; Foam, Silicon (eg  Allevyn, etc) 08/02/21 1126   Dressing Changed Changed 08/02/21 1126   Patient Tolerance Tolerated well 08/02/21 1126   Dressing Status Clean;Dry; Intact 08/02/21 Alta Zavala 8 BSN, RN, [employer]

## 2021-08-02 NOTE — PHYSICAL THERAPY NOTE
PHYSICAL THERAPY EVALUATION          Patient Name: Robin Cheadle  DLNWL'I Date: 8/2/2021   PT EVALUATION    82 y o     [id number]    Lung cancer ([facility] ) [C34 90]  Chest pain [R07 9]  [facility] with [facility] [J96 01]    Past Medical History:   Diagnosis Date    Arthritis     Asthma     Chronic pain disorder     back    Colon polyp     Disease of thyroid gland     hypo    Dystonic tremor     Essential tremor     Hyperlipidemia     Hypertension     Pneumonia     x2    Stroke ([facility] )     unknown and no residual     Past Surgical History:   Procedure Laterality Date    BACK SURGERY      CATARACT EXTRACTION Left     COLONOSCOPY      JOINT REPLACEMENT Left     reverse shoulder    MOHS RECONSTRUCTION N/A 6/3/2020    Procedure: REPAIR MOHS OF THE NOSE;  Surgeon: Thomas Ramírez MD;  Location: [facility] MAIN OR;  Service: Plastics    IL DELAY/SECTN FLAP LID,NOS,EAR,LIP N/A 7/15/2020    Procedure: DIVIDE NASAL FLAP;  Surgeon: Thomas Ramírez MD;  Location: [facility] MAIN OR;  Service: Plastics    ROTATOR CUFF REPAIR      SPINE SURGERY      TONSILLECTOMY          08/02/21 0932   PT Last Visit   PT Visit Date 08/02/21   Note Type   Note type Evaluation   Pain Assessment   Pain Assessment Tool 0-10   Pain Score 6   Pain Location/Orientation Orientation: Lower; Location: Back   Pain Onset/Description Other (Comment)  (chronic)   Effect of Pain on Daily Activities none observed   Hospital Pain Intervention(s) Repositioned; Ambulation/increased activity; Emotional support; Rest   Home Living   Type of [address] [place] [address] One level;Stairs to enter without rails   Bathroom Shower/Tub Tub/shower unit   Bathroom Toilet Standard   Bathroom Equipment Grab bars in shower;Grab bars around toilet   [address] [facility]   Additional Comments 1 TOY   ?historian   Prior Function   Level of [place] Independent with ADLs and functional mobility   Lives With Spouse   Receives Help From Family   ADL Assistance Independent   Falls in the last 6 months 0   Vocational Retired   Comments reports being indep pta with occasional use of RW  reports spouse home to assist prn  +  ?historian   Restrictions/Precautions   Other Precautions Cognitive; Chair Alarm; Bed Alarm; Fall Risk;Pain;Hard of hearing;Telemetry   General   Additional Pertinent History pt admitted 81/1/21 for acute on chronic diastolic heart failure  up as tolerated orders  prev admitted to [facility] from 7/14-17/21 for acute respiratory insufficiency, was dc home  hx of metastatic CA, chronic pain   Family/Caregiver Present No   Cognition   Overall Cognitive Status WFL   Arousal/Participation Cooperative   Orientation Level Oriented to person;Oriented to place;Oriented to time   Memory Decreased recall of precautions   Following Commands Follows one step commands without difficulty   Comments ?historian d/t [place] vs cognition   RLE Assessment   RLE Assessment WFL   LLE Assessment   LLE Assessment WFL   Coordination   Movements are Fluid and Coordinated 0   Coordination and Movement Description resting tremor bl hands, worse w activity/ intention   Transfers   Sit to Stand 5  Supervision   Additional items Armrests; Increased time required   Stand to Sit 5  Supervision   Additional items Armrests; Increased time required   Additional Comments inconsistent use of AD to transf   Ambulation/Elevation   Gait pattern Forward Flexion; Short stride; Excessively slow   Gait Assistance 5  Supervision   Assistive Device Rolling walker   Distance 15', 36'   Stair Management Assistance Not tested   Balance   Static Standing Fair -   Dynamic Standing Poor +   Ambulatory Poor +   Endurance Deficit   Endurance Deficit No   Activity Tolerance   Activity Tolerance Patient tolerated treatment well;Treatment limited secondary to medical complications (Comment)  (abn heart rate; RN present and to f/u) Medical Staff Made Aware OT   Nurse Made Aware Linton Primrose RN; cleared for therapy, present end of session to f/u on abn heart rate/rhythm   Assessment   Prognosis Fair   Problem List Impaired balance;Decreased mobility; Impaired judgement;Decreased safety awareness; Impaired hearing;Decreased skin integrity;Pain   Assessment Michael Hernandez is a 82 y o  male admitted to [facility] on 8/1/2021 for Acute on chronic diastolic (congestive) heart failure ([address] )  PT was consulted and pt was seen on 8/2/2021 for mobility assessment and d/c planning  Pt presents w high fall risk, telemetry, chronic back pain  At baseline reports being indep for ADLs and functional mobility w RW as needed  Pt is currently functioning at a supervision level for transfers and ambulation w RW limited household distances  Pt demonstrated fall risk secondary to decreased safety awareness however no gross LOB observed during session  Pt will benefit from continued skilled IP PT to address the above mentioned impairments  in order to maximize recovery and increase functional independence when completing mobility and ADLs  At this time PT recommendations for d/c are home w HHPT  End of session pt seated oob, alarm engaged and RN and OT present  Barriers to Discharge None   Goals   Patient Goals to get stronger in legs and overall, build endurance   STG Expiration Date 08/16/21   Short Term Goal #1 1)  Pt will perform bed mobility with Joya demonstrating appropriate technique 100% of the time in order to improve function  2)  Perform all transfers with Joya demonstrating safe and appropriate technique 100% of the time in order to improve ability to negotiate safely in home environment  3) Amb with least restrictive AD > 50'x1 with mod I in order to demonstrate ability to negotiate in home environment  4)  Improve overall strength and balance 1/2 grade in order to optimize ability to perform functional tasks and reduce fall risk  5) Increase activity tolerance to 45 minutes in order to improve endurance to functional tasks  6)  Negotiate stairs using most appropriate technique and S in order to be able to negotiate safely in home environment  7) PT for ongoing patient and family/caregiver education, DME needs and d/c planning in order to promote highest level of function in least restrictive environment  8) Indep w HEP 9) Improve ambulatory endurance to 6 mins in preparation of formal 6MWT  PT Treatment Day 0   Plan   Treatment/Interventions Functional transfer training;LE strengthening/ROM; Elevations; Therapeutic exercise; Endurance training;Cognitive reorientation;Patient/family training;Equipment eval/education; Bed mobility;Gait training; Compensatory technique education;Spoke to nursing;OT   PT Frequency 2-3x/wk   Recommendation   PT Discharge Recommendation Home with home health rehabilitation   PT - OK to Discharge Yes   Additional Comments The patient's AM-PAC Basic Mobility Inpatient Short Form Raw Score is 18, Standardized Score is 41 05  A standardized score less than 42 9 suggests the patient may benefit from discharge to post-acute rehabilitation services  Please also refer to the recommendation of the Physical Therapist for safe discharge planning  Current score reflects supervision for all tasks given fall risk and ?cognition/ safety awareness      AM-PAC Basic Mobility Inpatient   Turning in Bed Without Bedrails 3   Lying on Back to Sitting on Edge of Flat Bed 3   Moving Bed to Chair 3   Standing Up From Chair 3   Walk in Room 3   Climb 3-5 Stairs 3   Basic Mobility Inpatient Raw Score 18   Basic Mobility Standardized Score 41 05   History: co - morbidities, age, social background, past experience, fall risk, use of assistive device, ?assist for iadl's, cognition, multiple lines  Exam: impairments in systems including musculoskeletal (strength-observed, posture-flexed), neuromuscular (balance, gait, transfers, motor function and sensation), am-pac, cardiopulmonary, cognition  Clinical: unstable/unpredictable  Complexity:high      Farooq Wagner, PT

## 2021-08-02 NOTE — MALNUTRITION/BMI
This medical record reflects one or more clinical indicators suggestive of malnutrition and/or morbid obesity  Malnutrition Findings:   Adult Malnutrition type: Acute illness  Adult Degree of Malnutrition: Other severe protein calorie malnutrition (Sever pro/rené malnutrition r/t Metastatic lung Ca as evidenced by 16lb (9%) unplanned wt loss since 7/2/21, consuming <75%energy intake compared to est energy needs in 1 month  Treated with Dysphagia lvl 3 diet and Ensure Compact tid)  Malnutrition Characteristics: Inadequate energy, Weight loss    BMI Findings: Body mass index is 25 98 kg/m²  See Nutrition note dated 8/2/21 for additional details  Completed nutrition assessment is viewable in the nutrition documentation

## 2021-08-02 NOTE — TELEPHONE ENCOUNTER
Left voice message for patient's wife to call back the office  Also called patient home  Awaiting call back

## 2021-08-02 NOTE — PLAN OF CARE
Problem: OCCUPATIONAL THERAPY ADULT  Goal: Performs self-care activities at highest level of function for planned discharge setting  See evaluation for individualized goals  Description: Treatment Interventions: ADL retraining, Functional transfer training, Cognitive reorientation, UE strengthening/ROM, Endurance training, Patient/family training, Equipment evaluation/education, Compensatory technique education, Continued evaluation          See flowsheet documentation for full assessment, interventions and recommendations  Note: Limitation: Decreased ADL status, Decreased UE strength, Decreased Safe judgement during ADL, Decreased UE ROM, Decreased cognition, Decreased sensation, Decreased self-care trans  Prognosis: Good  Assessment: Pt is a 87y/o male admitted to the hospital 2* noted SOB, decreased ambulation, and decreased PO intake  Pt noted with CHF and acute respiratory failure with hypoxia  Pt with PMH metastatic lung cancer, diastolic heart failure, hypertension, essential tremors, chronic pain, CVA, L TSR, and spinal sx  PTA pt states independence with all aspects of his ADLs, transfers, ambulation--ocassional use of RW; neg falls, neg home alone, +  During initial eval, pt demonstrated deficits with his functional balance, functional mobility, ADL status, transfer safety, b/l UE strength, activity tolerance(currently fair=15-20mins), and cognition(i e memory, problem-solving, judgement/safety)  Pt would benefit from continued OT tx for the above deficits  3-5xwk/1-2wks        OT Discharge Recommendation:  (continue PT/OT)

## 2021-08-02 NOTE — ASSESSMENT & PLAN NOTE
Baseline Cr around 1 2   Presented with creatinine 1 76  Of note, during last discharge on 07/17/2021 creatinine was 1 7  Creatinine today is 1 58 after receiving IV diuretics  Continue to monitor renal function daily  Monitor for retention  Holding Norvasc and clonidine for now to avoid hypotension  Blood pressure currently 136/60

## 2021-08-02 NOTE — DISCHARGE INSTR - OTHER ORDERS
Wound Care Plan:   1-Apply Hydraguard lotion to bilateral heels for skin protection twice daily  2-Elevate heels off of bed/chair surface to offload pressure  3-Offloading air cushion in chair when out of bed  4-Moisturize skin daily with lotion  5-Turn/reposition every 2 hours while in bed (lay on your side as often as possible to offload pressure to your wound) and weight shift frequently while in chair for pressure re-distribution on skin  6-Sacrum--cleanse with soap and water, pat dry  Apply Triad paste to open wound bed and cover with bordered foam dressing  Change dressing every other day and as needed with soilage  7-OK to leave left forehead wound open to air  Follow-up at the [address]

## 2021-08-02 NOTE — OCCUPATIONAL THERAPY NOTE
Occupational Therapy Evaluation(tdmm=[id number])     Patient Name: Daren LONG Date: 8/2/2021  Problem List  Principal Problem:    Acute on chronic diastolic (congestive) heart failure ([facility] [address] )  Active Problems:    Hypertension    Tremor, essential    Malignant neoplasm of upper lobe of right lung ([facility] [address] )    Cancer related pain    Severe protein-calorie malnutrition (HCC)    Acute respiratory failure with hypoxia (HCC)    Pressure injury of skin of sacral region    Past Medical History  Past Medical History:   Diagnosis Date    Arthritis     Asthma     Chronic pain disorder     back    Colon polyp     Disease of thyroid gland     hypo    Dystonic tremor     Essential tremor     Hyperlipidemia     Hypertension     Pneumonia     x2    Stroke ([facility] [address] )     unknown and no residual     Past Surgical History  Past Surgical History:   Procedure Laterality Date    BACK SURGERY      CATARACT EXTRACTION Left     COLONOSCOPY      JOINT REPLACEMENT Left     reverse shoulder    MOHS RECONSTRUCTION N/A 6/3/2020    Procedure: REPAIR MOHS OF THE NOSE;  Surgeon: Sweetie Pittman MD;  Location: [facility] MAIN OR;  Service: Plastics    CO DELAY/SECTN FLAP LID,NOS,EAR,LIP N/A 7/15/2020    Procedure: DIVIDE NASAL FLAP;  Surgeon: Sweetie Pittman MD;  Location: [facility] MAIN OR;  Service: Plastics    ROTATOR CUFF REPAIR      SPINE SURGERY      TONSILLECTOMY               08/02/21 0855   Note Type   Note type Evaluation   Restrictions/Precautions   Weight Bearing Precautions Per Order No   Other Precautions Fall Risk;Pain;Cognitive; Chair Alarm; Bed Alarm;Hard of hearing  (sacral wound)   Pain Assessment   Pain Assessment Tool 0-10   Pain Score 6   Pain Location/Orientation Location: Back;Orientation: Lower   Home Living   Type of [address] [place] [address] One level  (1-2ste)   Bathroom Shower/Tub Tub/shower unit   Bathroom Toilet Standard   Bathroom Equipment Grab bars in shower;Grab bars around toilet   [address] [facility] Prior Function   Lives With Spouse   ADL Assistance Independent   Falls in the last 6 months 0   Lifestyle   Autonomy PTA pt states independence with all aspects of his ADLs, transfers, ambulation--ocassional use of RW; neg falls, neg home alone, +   Reciprocal Relationships supportive dtr, son   Service to Others worked as an [employer]; served in the [email or web address] [address] [employer] "My hearing isn't too good "   ADL   Where Assessed Chair   Eating Assistance 5  Supervision/Setup   Grooming Assistance 5  Supervision/Setup   UB Bathing Assistance 5  Supervision/Setup   LB Bathing Assistance 4  Minimal Assistance   UB Dressing Assistance 5  Supervision/Setup   LB Dressing Assistance 4  Minimal Assistance   Transfers   Sit to Stand 5  Supervision   Additional items Increased time required;Verbal cues   Stand to Sit 5  Supervision   Additional items Increased time required;Verbal cues   Functional Mobility   Functional Mobility 5  Supervision   Additional items Rolling walker   Balance   Static Sitting Good   Dynamic Sitting Fair +   Static Standing Fair -   Dynamic Standing Poor +   Activity Tolerance   Activity Tolerance Patient limited by fatigue;Patient limited by pain   Medical Staff Made Aware nsg, P T     RUE Assessment   RUE Assessment WFL   RUE Strength   RUE Overall Strength Within Functional Limits - able to perform ADL tasks with strength  (4/5 throughout)   LUE Assessment   LUE Assessment WFL   LUE Strength   LUE Overall Strength Within Functional Limits - able to perform ADL tasks with strength  (shr=3+/5, elbow-distal=4/5;hx TSR)   Hand Function   Gross Motor Coordination Functional   Fine Motor Coordination Functional   Sensation   Light Touch No apparent deficits   Proprioception   Proprioception No apparent deficits   Vision-Basic Assessment   Current Vision   (glasses)   Vision - Complex Assessment   Acuity Able to read clock/calendar on wall without difficulty   Perception   Inattention/Neglect Appears intact   Cognition   Overall Cognitive Status Impaired   Arousal/Participation Alert   Attention Attends with cues to redirect   Orientation Level Oriented to person;Oriented to place;Oriented to time   Memory Decreased short term memory;Decreased recall of recent events;Decreased recall of precautions   Following Commands Follows one step commands with increased time or repetition   Assessment   Limitation Decreased ADL status; Decreased UE strength;Decreased Safe judgement during ADL;Decreased UE ROM; Decreased cognition;Decreased sensation;Decreased self-care trans   Prognosis Good   Assessment Pt is a 81y/o male admitted to the hospital 2* noted SOB, decreased ambulation, and decreased PO intake  Pt noted with CHF and acute respiratory failure with hypoxia  Pt with PMH metastatic lung cancer, diastolic heart failure, hypertension, essential tremors, chronic pain, CVA, L TSR, and spinal sx  PTA pt states independence with all aspects of his ADLs, transfers, ambulation--ocassional use of RW; neg falls, neg home alone, +  During initial eval, pt demonstrated deficits with his functional balance, functional mobility, ADL status, transfer safety, b/l UE strength, activity tolerance(currently fair=15-20mins), and cognition(i e memory, problem-solving, judgement/safety)  Pt would benefit from continued OT tx for the above deficits  3-5xwk/1-2wks  Goals   Patient Goals "to get stronger "   STG Time Frame   (1-7 days)   Short Term Goal #1 Pt will demonstrate improved activity tolerance to good(20-30mins) and standing tolerance to 3-5mins to assist with ADLs  Short Term Goal #2 Pt will tolerate continued cognitive/home-safety assessment and appropriate d/c recommendations will be provided      Short Term Goal  Pt will demonstrate mod I with their sit-stand transfers to assist with completion of their LE dressing  LTG Time Frame   (7-14 days)   Long Term Goal #1 Pt will demonstrate proper walker/transfer safety 100% of the time  Long Term Goal #2 Pt will demonstrate g/g- balance with all functional activities  Long Term Goal Pt will demonstrate mod I with their UE and LE bathing/dresssing  Plan   Treatment Interventions ADL retraining;Functional transfer training;Cognitive reorientation;UE strengthening/ROM; Endurance training;Patient/family training;Equipment evaluation/education; Compensatory technique education;Continued evaluation   Goal Expiration Date 08/16/21   OT Treatment Day 0   OT Frequency 3-5x/wk   Recommendation   OT Discharge Recommendation   (continue PT/OT)   AM-PAC Daily Activity Inpatient   Lower Body Dressing 3   Bathing 3   Toileting 3   Upper Body Dressing 3   Grooming 4   Eating 4   Daily Activity Raw Score 20   Daily Activity Standardized Score (Calc for Raw Score >=11) 42 03   AM-PAC Applied Cognition Inpatient   Following a Speech/Presentation 3   Understanding Ordinary Conversation 3   Taking Medications 2   Remembering Where Things Are Placed or Put Away 2   Remembering List of 4-5 Errands 2   Taking Care of Complicated Tasks 2   Applied Cognition Raw Score 14   Applied Cognition Standardized Score 32 02   Matthew Rivas, OT

## 2021-08-02 NOTE — ASSESSMENT & PLAN NOTE
Requiring 2 L nasal cannula oxygen supplementation on admission, is now on room air  Multifactorial as above  Would check home oxygen evaluation prior to discharge

## 2021-08-02 NOTE — ASSESSMENT & PLAN NOTE
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

## 2021-08-02 NOTE — ASSESSMENT & PLAN NOTE
Wt Readings from Last 3 Encounters:   08/02/21 77 5 kg (170 lb 13 7 oz)   07/16/21 80 2 kg (176 lb 12 9 oz)   07/02/21 84 6 kg (186 lb 6 4 oz)     · 82-year-old male with past history of lung cancer, diastolic heart failure, hypertension presented with shortness of breath  · Patient previously was admitted here several weeks prior with similar complaints discharged on 7/17  · 2D echo from last month reviewed,  EF with grade 1 diastolic dysfunction  · ProBNP elevated 1600, similar as previous  · Physical exam does note +2 pitting of lower extremity mostly around the ankles however this could also be to hypoalbuminemia  · Weight of 179 lb on admission, wife reports baseline weight of 172-174  · Appreciate cardiology consult recommendations  · I believe shortness of breath is combination of known lung cancer, acute CHF, deconditioning  · Continue IV Lasix 40 mg once daily  · Renal function did show some improvement today, continue monitoring BMP daily  · daily weights  · PT and OT consulted, family considering rehab recommendations at this time from physical therapy or home health

## 2021-08-02 NOTE — ASSESSMENT & PLAN NOTE
Malnutrition Findings:      BMI Findings: Body mass index is 25 98 kg/m²       Continue dysphagia mechanical soft diet, thin liquids and nutritional supplements

## 2021-08-02 NOTE — CONSULTS
Consult - Cardiology   Ny Rothman 82 y o  male MRN: [id number]  Unit/Bed#: E2 -01 Encounter: [id number]        Reason For Consult:  Heart failure                 Assessment:  Acute hypoxic respiratory failure (POA)  Chronic and acute Diastolic heart failure   Echo 7/16/2021:  LVEF 58% (no RWMAs), grade 1 DD (trace MR & TR)   O/p Rx:  Demadex 20 mg/d  Pharmacologic nuclear stress test negative for ischemia, calculated EF 72% (August 2019)  HTN:   O/p Rx:  Norvasc 10 mg, Coreg 6 25 mg b i d , Catapres 0 1 mg, Demadex 20 mg daily  Dyslipidemia   Zocor 10 mg  Right bundle-branch block    Other    - hypoalbuminemia (POA):  1 8    - Non-small cell lung CA (DX 6/2/2021) - stage 4 w/ brain & bone metastesis, palliative chemo & XRT    - RLE Venous insufficiency:     - Hx bilateral nonobstructive carotid stenosis    - Hypothyroidism    - asthma     Discussion / Plan:  #   patient presents with some signs of volume overload which are likely poly factorial related to probable acute diastolic CHF, hypoalbuminemia, and renal dysfunction            · Suggest repeating IV Lasix this morning following oxygenation/symptoms/exam/GFR ~~> will order  · Check daily standing weights  · Prior to admission patient had been taking Norvasc, Coreg, clonidine, and diuretic for treatment of hypertension ~~> because of mild hypotension on arrival patient currently receiving only Coreg and diuretic  Continue same with observation of BP trend which if increased could consider advancing dose of Coreg to simplify regimen or resuming amlodipine without clonidine  History Of Present Illness:  Ny Rothman is an 53-year-old unfortunately has non-small cell lung cancer with metastases to bone and brain for which he is receiving palliative chemotherapy having received a dose of kaytyuda (possibly 2nd) on Friday 7/30 Saturday morning the patient felt some increased fatigue with some progressive dyspnea throughout the day prompting him to call an ambulance mid day  EMS had reported him being hypoxic with a saturation the mid 80s upon arrival improving to the 90s on low-flow nasal cannula oxygen  On arrival chest radiograph reported signs of interstitial edema with proBNP of 1618 which is quite similar to value a couple weeks ago when he also was hospitalized and seen by Cardiology possibly of some mild CHF  At that time his pre-hospital dose of Lasix 40 mg b i d  Was changed to torsemide 20 mg b i d  With the patient discharged on 7/17  Presently the patient denies any feelings of cardiac ectopy or chest pain  He states he is coughing throughout the day with limited expectoration and no hemoptysis  He has had no obvious orthopnea or PND but has had some modest persistent lower extremity edema perhaps with some worsening in the last 1-2 days  Past Medical History:        Past Medical History:   Diagnosis Date    Arthritis     Asthma     Chronic pain disorder     back    Colon polyp     Disease of thyroid gland     hypo    Dystonic tremor     Essential tremor     Hyperlipidemia     Hypertension     Pneumonia     x2    Stroke ([address] )     unknown and no residual      Past Surgical History:   Procedure Laterality Date    BACK SURGERY      CATARACT EXTRACTION Left     COLONOSCOPY      JOINT REPLACEMENT Left     reverse shoulder    MOHS RECONSTRUCTION N/A 6/3/2020    Procedure: REPAIR MOHS OF THE NOSE;  Surgeon: Svitlana Toussaint MD;  Location: [address] [place] [address] OR;  Service: Plastics    CA DELAY/SECTN FLAP LID,NOS,EAR,LIP N/A 7/15/2020    Procedure: DIVIDE NASAL FLAP;  Surgeon: Svitlana Toussaint MD;  Location: [address] [place] [address];  Service: Plastics    ROTATOR CUFF REPAIR      SPINE SURGERY      TONSILLECTOMY          Allergy:        Allergies   Allergen Reactions    Lisinopril      Swelling of tongue    Sulfa Antibiotics Other (See Comments)     pancreatitis    Other reaction(s):  Other (Please comment)  Pancreas destroy itself    Zestoretic [Lisinopril-Hydrochlorothiazide]      Pancreatitis       Medications:       Prior to Admission medications    Medication Sig Start Date End Date Taking? Authorizing Provider   albuterol (PROVENTIL HFA,VENTOLIN HFA) 90 mcg/act inhaler Inhale 2 puffs every 4 (four) hours as needed  12/16/19   Historical Provider, MD   amLODIPine (NORVASC) 10 mg tablet Take 10 mg by mouth daily  4/21/19   Historical Provider, MD   ASPIRIN 81 PO Take 81 mg by mouth daily     Historical Provider, MD   carvedilol (COREG) 6 25 mg tablet Take 6 25 mg by mouth 2 (two) times a day with meals  4/23/19   Historical Provider, MD   cloNIDine (CATAPRES) 0 1 mg tablet Take 0 1 mg by mouth daily at bedtime  2/22/19   Historical Provider, MD   Flaxseed, Linseed, (FLAXSEED OIL) 1000 MG CAPS Take 1 capsule by mouth daily     Historical Provider, MD   levothyroxine 75 mcg tablet Take 75 mcg by mouth daily     Historical Provider, MD   morphine (MS CONTIN) 30 mg 12 hr tablet TAKE 1 TABLET (30 MG TOTAL) BY MOUTH 2 (TWO) TIMES A DAY   MAX DAILY AMOUNT  60 MG 6/9/21   Historical Provider, MD   Multiple Vitamin (MULTI VITAMIN DAILY PO) Take 1 capsule by mouth daily   Patient not taking: Reported on 7/2/2021    Historical Provider, MD   oxyCODONE (ROXICODONE) 10 MG TABS Take 10 mg by mouth every 4 (four) hours as needed  2/5/19   Historical Provider, MD   polyethylene glycol (MIRALAX) 17 g packet Take 17 g by mouth daily    Historical Provider, MD   potassium chloride (Klor-Con) 10 mEq tablet Take 1 tablet (10 mEq total) by mouth daily 7/17/21   Tammy Hernandez DO   predniSONE 10 mg tablet Days 1-3: 4 tablets daily; Days 4-6: 3 tablets daily; Days 7-9: 2 tablets daily; Days 10-12: 1 tablet daily then stop  Patient not taking: Reported on 8/1/2021 7/17/21   Tammy Hernandez DO   primidone (MYSOLINE) 50 mg tablet Take 1 tablet (50 mg total) by mouth every 12 (twelve) hours 7/17/21   Tammy Hernandez DO   prochlorperazine (COMPAZINE) 10 mg tablet Take 10 mg by mouth every 6 (six) hours as needed for vomiting  21   Historical Provider, MD   senna-docusate sodium (SENOKOT-S) 8 6-50 mg per tablet Take 1 tablet by mouth daily before dinner    Historical Provider, MD   simvastatin (ZOCOR) 10 mg tablet Take 10 mg by mouth daily at bedtime  3/31/19   Historical Provider, MD   torsemide (DEMADEX) 20 mg tablet Take 1 tablet (20 mg total) by mouth daily (start on 2021) 21   Yanet Bazzi DO   umeclidinium-vilanterol (Anoro Ellipta) 62 5-25 MCG/INH inhaler Inhale 1 puff daily 21  Yanet Bazzi DO       Family History:     Family History   Problem Relation Age of Onset    No Known Problems Mother     No Known Problems Father         Social History:       Social History     Socioeconomic History    Marital status: Unknown     Spouse name: None    Number of children: None    Years of education: None    Highest education level: None   Occupational History    None   Tobacco Use    Smoking status: Former Smoker     Types: Cigarettes     Quit date: 1994     Years since quittin 2    Smokeless tobacco: Current User     Types: Chew    Tobacco comment: currently uses chewing tobacco   Vaping Use    Vaping Use: Never used   Substance and Sexual Activity    Alcohol use: Yes     Comment: < 1 drink a month    Drug use: Never    Sexual activity: Not Currently   Other Topics Concern    None   Social History Narrative    DOES NOT CONSUME CAFFEINE     Social Determinants of Health     Financial Resource Strain:     Difficulty of Paying Living Expenses:    Food Insecurity:     Worried About Running Out of Food in the Last Year:     Ran Out of Food in the Last Year:    Transportation Needs:     Lack of Transportation (Medical):      Lack of Transportation (Non-Medical):    Physical Activity:     Days of Exercise per Week:     Minutes of Exercise per Session:    Stress:     Feeling of Stress :    Social Connections:     Frequency of Communication with Friends and Family:     Frequency of Social Gatherings with Friends and Family:     Attends [employer] Services:     Active Member of Clubs or Organizations:     Attends Club or Organization Meetings:     Marital Status:    Intimate Partner Violence:     Fear of Current or Ex-Partner:     Emotionally Abused:     Physically Abused:     Sexually Abused:        ROS:  Symptoms per HPI  The remainder of the review of systems is negative    Exam:  General:  Alert, appropriately conversant and comfortable-appearing  Head:  Healing sutured laceration of left forehead  Eyes:  EOMI  Pupils - equal, round, reactive to accomodation  No icterus  Normal Conjunctiva  Oropharynx: Moist without lesion  Neck:  No gross bruit, JVD, thyromegaly, or lymphadenopathy  Heart:  Regular with controlled rate  No rub nor significant murmur  Lungs:  Coarse breath sounds/rhonchi - right medial and upper lobe  Trace rales   Abdomen:  Soft and nontender with normal bowel sounds  No organomegaly or mass  Lower Limbs:  Distal right lower extremity trace pitting edema  Distal left lower extremity 1+ edema  Pulses[de-identified]  RLE - DP:  1-2+                 LLE - DP:  1-2+  Musculoskeletal: Independent movement of limbs observed, Formal ROM and strength eval not performed  Neurologic:    Oriented to: person, place, situation  Cranial Nerves: grossly intact - vision, smell, taste, and hearing were not tested       Motor function: grossly normal, symmetric   Sensation: Was not tested      Vitals:    08/01/21 1848 08/01/21 2224 08/02/21 0600 08/02/21 0700   BP: 145/61 114/54  136/60   BP Location: Left arm Right arm  Left arm   Pulse: 86 89  90   Resp: 18 18  18   Temp: (!) 97 °F (36 1 °C) 98 7 °F (37 1 °C)  98 2 °F (36 8 °C)   TempSrc: Temporal Temporal  Tympanic   SpO2: 94% 96%  95%   Weight:   77 5 kg (170 lb 13 7 oz)            DATA:      ECG: -----------------------------------------------------------------------------------------------------------------------------------------------  Telemetry:   Normal sinus rhythm with ventricular rate            -----------------------------------------------------------------------------------------------------------------------------------------------  Echocardiogram:         TTE, 7/16/2021 -[facility]     SUMMARY:  1  This is a technically difficult study  2  Left ventricle is normal size and function  Left ventricular ejection fraction is estimated at 58%  3  Grade 1 diastolic dysfunction  4  There are no obvious high-grade regional wall motion abnormalities  Endocardium still mildly limited due to poor acoustic windows despite use of echo contrast  5  Right atrium is mildly dilated  6  Aortic valve is sclerotic with adequate separation  7  Trace mitral regurgitation  Mild mitral annular calcification  8  Trace tricuspid regurgitation pulmonary artery systolic pressure is estimated at 25-30 mm Hg  9  Aortic root is mildly dilated 3 8 cm  10  There is no study for comparison        HISTORY: PRIOR HISTORY: Cancer  Hypertension  Hyperlipidemia  Hypothyroid  Asthma      PROCEDURE: The procedure was performed at the bedside  This was a routine study  The transthoracic approach was used  The study included complete 2D imaging, M-mode, complete spectral Doppler, and color Doppler  The heart rate was 97 bpm,  at the start of the study  Images were obtained from the parasternal, apical, subcostal, and suprasternal notch acoustic windows  Intravenous contrast ( 0 4 ml Definity in NSS) was administered to opacify the left ventricle  Echocardiographic views were limited due to poor acoustic window availability, decreased penetration, and lung interference  This was a technically difficult study      LEFT VENTRICLE: Left ventricle is normal in size and function   Left ventricular ejection fraction is estimated at 58%  Normal wall thickness  Grade 1 diastolic dysfunction  There are no obvious high-grade regional wall motion  abnormalities      RIGHT VENTRICLE: Right ventricle is mildly dilated with normal systolic function      LEFT ATRIUM: Left atrium is normal in size      ATRIAL SEPTUM: The interatrial septum appears to be grossly normal without evidence of shunting by color-flow Doppler      RIGHT ATRIUM: Right atrium is mildly dilated      MITRAL VALVE: Mitral valve opens well  Fibrocalcific changes of the mitral valve  Mild mitral annular calcification  No evidence of mitral stenosis  Trace mitral regurgitation     AORTIC VALVE: Aortic valve is sclerotic, but not well visualized  No evidence of aortic stenosis or aortic regurgitation      TRICUSPID VALVE: Tricuspid valve opens well  Trace tricuspid regurgitation  Pulmonary systolic pressures estimated at 25-30 mm Hg     PULMONIC VALVE: Pulmonic valve is not well visualized  No evidence of pulmonic stenosis or pulmonic regurgitation      PERICARDIUM: There is no evidence of significant pericardial effusion      AORTA: Aortic root is mildly dilated 3 8 cm  Ascending aorta is normal in size      SYSTEMIC VEINS: IVC: IVC is normal size with greater than 50% respirophasic collapse         -----------------------------------------------------------------------------------------------------------------------------------------------  Weights:     Wt Readings from Last 20 Encounters:   08/02/21 77 5 kg (170 lb 13 7 oz)   07/16/21 80 2 kg (176 lb 12 9 oz)   07/02/21 84 6 kg (186 lb 6 4 oz)   02/23/21 87 5 kg (193 lb)   02/10/21 91 4 kg (201 lb 9 6 oz)   09/14/20 92 1 kg (203 lb)   08/18/20 88 9 kg (196 lb)   07/15/20 91 6 kg (202 lb)   07/02/20 91 6 kg (202 lb)   03/18/20 90 4 kg (199 lb 6 4 oz)   02/26/20 91 8 kg (202 lb 6 4 oz)   02/14/20 88 5 kg (195 lb)   11/20/19 90 7 kg (200 lb)   09/20/19 90 3 kg (199 lb)   07/31/19 91 9 kg (202 lb 9 6 oz)   04/25/19 90 7 kg (200 lb)   , Body mass index is 25 98 kg/m²           Lab Studies:    Results from last 7 days   Lab Units 08/01/21  1253   TROPONIN I ng/mL <0 02            Results from last 7 days   Lab Units 08/02/21  0619 08/01/21  1659 08/01/21  1253   WBC Thousand/uL 7 14  --  8 60   HEMOGLOBIN g/dL 11 5*  --  10 7*   HEMATOCRIT % 35 0*  --  32 1*   PLATELETS Thousands/uL 195 202 192   ,   Results from last 7 days   Lab Units 08/02/21  0619 08/01/21  1253   POTASSIUM mmol/L 4 2 3 4*   CHLORIDE mmol/L 98* 98*   CO2 mmol/L 32 34*   BUN mg/dL 65* 68*   CREATININE mg/dL 1 58* 1 76*   CALCIUM mg/dL 9 1 8 5   ALK PHOS U/L  --  100   ALT U/L  --  151*   AST U/L  --  49*

## 2021-08-02 NOTE — PROGRESS NOTES
[address]  Progress Note - Robin Cheadle 1935, 82 y o  male MRN: [id number]  Unit/Bed#: E2 -01 Encounter: [id number]  Primary Care Provider: Justin Oakes DO   Date and time admitted to hospital: 8/1/2021 12:39 PM    * Acute on chronic diastolic (congestive) heart failure (HCC)  Assessment & Plan  Wt Readings from Last 3 Encounters:   08/02/21 77 5 kg (170 lb 13 7 oz)   07/16/21 80 2 kg (176 lb 12 9 oz)   07/02/21 84 6 kg (186 lb 6 4 oz)     · 46-year-old male with past history of lung cancer, diastolic heart failure, hypertension presented with shortness of breath  · Patient previously was admitted here several weeks prior with similar complaints discharged on 7/17  · 2D echo from last month reviewed,  EF with grade 1 diastolic dysfunction  · ProBNP elevated 1600, similar as previous  · Physical exam does note +2 pitting of lower extremity mostly around the ankles however this could also be to hypoalbuminemia  · Weight of 179 lb on admission, wife reports baseline weight of 172-174  · Appreciate cardiology consult recommendations  · I believe shortness of breath is combination of known lung cancer, acute CHF, deconditioning  · Continue IV Lasix 40 mg once daily  · Renal function did show some improvement today, continue monitoring BMP daily  · daily weights  · PT and OT consulted, family considering rehab recommendations at this time from physical therapy or home health      Malignant neoplasm of upper lobe of right lung [facility])  Assessment & Plan  Patient has a 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; 6/2/2021 Initial Diagnosis   Follows [facility], receiving palliative chemo on keytruda and radiation  Will ultimately get gamma knife for brain meds per [facility] records  Received 2nd dose was this past weekend  Patients daughter states she is not happy with care at [facility] and would like to consider transfer of care to [facility] consulted while here    Hypertension  Assessment & Plan  Continue Coreg   Holding Norvasc, clondine due to low blood pressure while diuresing  Monitor vital signs  May be able to simplify regimen upon discharge    Pressure injury of skin of sacral region  Assessment & Plan  Wound sacral ulcer present on admission  Wound care consulted    Acute respiratory failure with hypoxia ([facility] [address] )  Assessment & Plan  Requiring 2 L nasal cannula oxygen supplementation on admission, is now on room air  Multifactorial as above  Would check home oxygen evaluation prior to discharge    Severe protein-calorie malnutrition ([facility] [address] )  Assessment & Plan  Malnutrition Findings:      BMI Findings: Body mass index is 25 98 kg/m²  Continue dysphagia mechanical soft diet, thin liquids and nutritional supplements    Acute kidney injury ([facility] [address] )  Assessment & Plan  Baseline Cr around 1 2   Presented with creatinine 1 76  Of note, during last discharge on 07/17/2021 creatinine was 1 7  Creatinine today is 1 58 after receiving IV diuretics  Continue to monitor renal function daily  Monitor for retention  Holding Norvasc and clonidine for now to avoid hypotension  Blood pressure currently 136/60    Cancer related pain  Assessment & Plan  PDMP reviewed, continue oxycodone and MS Contin    Tremor, essential  Assessment & Plan  Continue primidone      VTE Pharmacologic Prophylaxis:   Pharmacologic: Heparin  Mechanical VTE Prophylaxis in Place: No    Patient Centered Rounds: I have performed bedside rounds with nursing staff today  Discussions with Specialists or Other Care Team Provider:     Education and Discussions with Family / Patient:  Patient at bedside, daughter at bedside    Time Spent for Care: 30 minutes  More than 50% of total time spent on counseling and coordination of care as described above      Current Length of Stay: 1 day(s)    Current Patient Status:    Certification Statement: The patient will continue to require additional inpatient hospital stay due to Acute CHF, lung cancer, acute kidney injury, hypoxia    Discharge Plan:  Pending clinical course    Code Status: Level 1 - Full Code      Subjective:   Frank Mullins with sitting on the chair during evaluation  He was full of jokes on dark as an throughout interview  He states he feels better than admission  He still feels short of breath at rest and exertion however he is now stable on room air  He denies any chest pain  He does have some leg swelling  daughter at bedside is very concerned about care from Oncology service at [facility] and would like transfer of care to [facility]  She is concerned that he does not do well after Keytruda and would like to discuss options with Oncology service who have been consulted here  Objective:     Vitals:   Temp (24hrs), Av 9 °F (36 6 °C), Min:97 °F (36 1 °C), Max:98 7 °F (37 1 °C)    Temp:  [97 °F (36 1 °C)-98 7 °F (37 1 °C)] 98 2 °F (36 8 °C)  HR:  [74-90] 90  Resp:  [18] 18  BP: ()/(45-61) 136/60  SpO2:  [93 %-97 %] 95 %  Body mass index is 25 98 kg/m²  Input and Output Summary (last 24 hours): Intake/Output Summary (Last 24 hours) at 2021 1149  Last data filed at 2021 1001  Gross per 24 hour   Intake 650 ml   Output --   Net 650 ml       Physical Exam:     Physical Exam  Vitals and nursing note reviewed  HENT:      Head: Normocephalic  Eyes:      Conjunctiva/sclera: Conjunctivae normal    Cardiovascular:      Rate and Rhythm: Normal rate and regular rhythm  Pulmonary:      Effort: Pulmonary effort is normal  No respiratory distress  Breath sounds: Rales (Right-sided) present  Wheezes:  right-sided  Comments: On room air currently, speaking in full sentences without difficulty  Abdominal:      General: Bowel sounds are normal       Palpations: Abdomen is soft  Tenderness: There is no abdominal tenderness  There is no guarding or rebound     Musculoskeletal:      Right lower leg: Edema present  Left lower leg: Edema present  Comments: Pitting Ankle swelling bilaterally   Skin:     General: Skin is warm  Coloration: Skin is pale  Neurological:      Mental Status: He is alert  Mental status is at baseline  Comments: Globally weak and deconditioned   Psychiatric:         Mood and Affect: Mood normal            Additional Data:     Labs:    Results from last 7 days   Lab Units 08/02/21  0619   WBC Thousand/uL 7 14   HEMOGLOBIN g/dL 11 5*   HEMATOCRIT % 35 0*   PLATELETS Thousands/uL 195   NEUTROS PCT % 85*   LYMPHS PCT % 4*   MONOS PCT % 8   EOS PCT % 2     Results from last 7 days   Lab Units 08/02/21  0619 08/01/21  1253   SODIUM mmol/L 138 140   POTASSIUM mmol/L 4 2 3 4*   CHLORIDE mmol/L 98* 98*   CO2 mmol/L 32 34*   BUN mg/dL 65* 68*   CREATININE mg/dL 1 58* 1 76*   ANION GAP mmol/L 8 8   CALCIUM mg/dL 9 1 8 5   ALBUMIN g/dL  --  1 8*   TOTAL BILIRUBIN mg/dL  --  0 33   ALK PHOS U/L  --  100   ALT U/L  --  151*   AST U/L  --  49*   GLUCOSE RANDOM mg/dL 182* 147*     Results from last 7 days   Lab Units 08/01/21  1253   INR  1 47*                       * I Have Reviewed All Lab Data Listed Above  * Additional Pertinent Lab Tests Reviewed:  All Labs Within Last 24 Hours Reviewed    Imaging:    Imaging Reports Reviewed Today Include:  Reviewed  Imaging Personally Reviewed by Myself Includes:      Recent Cultures (last 7 days):           Last 24 Hours Medication List:   Current Facility-Administered Medications   Medication Dose Route Frequency Provider Last Rate    albuterol  2 puff Inhalation Q4H PRN Paulina Haney MD      aspirin  81 mg Oral Daily aPulina Haney MD      carvedilol  6 25 mg Oral BID With Meals Paulina Haney MD      heparin (porcine)  5,000 Units Subcutaneous [facility] Paulina Haney MD      levothyroxine  75 mcg Oral Early Morning Paulina Haney MD      morphine  30 mg Oral Q12H [facility] MD [employer]      oxyCODONE  10 mg Oral Q4H PRN Paulina Haney MD     [facility] polyethylene glycol  17 g Oral Daily Raffi Kearns MD      potassium chloride  10 mEq Oral Daily Raffi Kearns MD      pravastatin  20 mg Oral Daily With Francisco Salinas MD      primidone  50 mg Oral Q12H [facility] Raffi Kearns MD      prochlorperazine  10 mg Oral Q6H PRN Raffi Kearns MD      tiotropium  18 mcg Inhalation Daily Raffi Kearns MD      And    salmeterol  1 puff Inhalation BID Raffi Kearns MD      senna-docusate sodium  1 tablet Oral Before Francisco Salinas MD          Today, Patient Was Seen By: Sukhdev Meade PA-C    ** Please Note: Dictation voice to text software may have been used in the creation of this document   **

## 2021-08-02 NOTE — ASSESSMENT & PLAN NOTE
Continue Coreg   Holding Norvasc, clondine due to low blood pressure while diuresing  Monitor vital signs  May be able to simplify regimen upon discharge

## 2021-08-02 NOTE — UTILIZATION REVIEW
Inpatient Admission Authorization Request   NOTIFICATION OF INPATIENT ADMISSION/INPATIENT AUTHORIZATION REQUEST   SERVICING FACILITY:   [address] [place] [address], [facility], [facility]  Tax ID: [id number]  NPI: [id number]  Place of Service: Inpatient [address] [facility]  [address]  Place of Service Code: 24     ATTENDING PROVIDER:  Attending Name and NPI#: Vicente Quinones [[id number]]  Address: [address] [place] [address], [facility], [facility]  Phone: [phone]     UTILIZATION REVIEW CONTACT:  Cherelle Cochran, Utilization   Network Utilization Review Department  Phone: [phone]  Fax: [phone]  Email: [email or web address]     PHYSICIAN ADVISORY SERVICES:  FOR ZHOE-GH-KIJE REVIEW - MEDICAL NECESSITY DENIAL  Phone: [phone]  Fax: [phone]  Email: [email or web address]     TYPE OF REQUEST:  Inpatient Status     ADMISSION INFORMATION:  ADMISSION DATE/TIME: 8/1/21  2:32 PM  PATIENT DIAGNOSIS CODE/DESCRIPTION:  Lung cancer ([facility] [address] ) [C34 90]  Chest pain [R07 9]  Acute respiratory failure with hypoxia ([facility] [address] ) [J96 01]  DISCHARGE DATE/TIME: No discharge date for patient encounter  DISCHARGE DISPOSITION (IF DISCHARGED): Home/Self Care     IMPORTANT INFORMATION:  Please contact the Dasia Harper directly with any questions or concerns regarding this request  Department voicemails are confidential     Send requests for admission clinical reviews, concurrent reviews, approvals, and administrative denials due to lack of clinical to fax [phone]

## 2021-08-02 NOTE — PLAN OF CARE
Problem: PHYSICAL THERAPY ADULT  Goal: Performs mobility at highest level of function for planned discharge setting  See evaluation for individualized goals  Description: Treatment/Interventions: Functional transfer training, LE strengthening/ROM, Elevations, Therapeutic exercise, Endurance training, Cognitive reorientation, Patient/family training, Equipment eval/education, Bed mobility, Gait training, Compensatory technique education, Spoke to nursing, OT          See flowsheet documentation for full assessment, interventions and recommendations  Note: Prognosis: Fair  Problem List: Impaired balance, Decreased mobility, Impaired judgement, Decreased safety awareness, Impaired hearing, Decreased skin integrity, Pain  Assessment: Blayne Davis is a 82 y o  male admitted to [facility] on 8/1/2021 for Acute on chronic diastolic (congestive) heart failure ([facility] [address] )  PT was consulted and pt was seen on 8/2/2021 for mobility assessment and d/c planning  Pt presents w high fall risk, telemetry, chronic back pain  At baseline reports being indep for ADLs and functional mobility w RW as needed  Pt is currently functioning at a supervision level for transfers and ambulation w RW limited household distances  Pt demonstrated fall risk secondary to decreased safety awareness however no gross LOB observed during session  Pt will benefit from continued skilled IP PT to address the above mentioned impairments  in order to maximize recovery and increase functional independence when completing mobility and ADLs  At this time PT recommendations for d/c are home w HHPT  End of session pt seated oob, alarm engaged and RN and OT present  Barriers to Discharge: None        PT Discharge Recommendation: Home with home health rehabilitation     PT - OK to Discharge: Yes    See flowsheet documentation for full assessment

## 2021-08-03 ENCOUNTER — APPOINTMENT (INPATIENT)
Dept: RADIOLOGY | Facility: HOSPITAL | Age: 86
DRG: 291 | End: 2021-08-03
Payer: COMMERCIAL

## 2021-08-03 ENCOUNTER — APPOINTMENT (INPATIENT)
Dept: NUCLEAR MEDICINE | Facility: HOSPITAL | Age: 86
DRG: 291 | End: 2021-08-03
Payer: COMMERCIAL

## 2021-08-03 PROBLEM — I82.431 DEEP VEIN THROMBOSIS (DVT) OF POPLITEAL VEIN OF RIGHT LOWER EXTREMITY (HCC): Status: ACTIVE | Noted: 2021-08-03

## 2021-08-03 LAB
ANION GAP SERPL CALCULATED.3IONS-SCNC: 6 MMOL/L (ref 4–13)
BUN SERPL-MCNC: 57 MG/DL (ref 5–25)
CALCIUM SERPL-MCNC: 9 MG/DL (ref 8.3–10.1)
CHLORIDE SERPL-SCNC: 96 MMOL/L (ref 100–108)
CO2 SERPL-SCNC: 36 MMOL/L (ref 21–32)
CREAT SERPL-MCNC: 1.44 MG/DL (ref 0.6–1.3)
GFR SERPL CREATININE-BSD FRML MDRD: 44 ML/MIN/1.73SQ M
GLUCOSE SERPL-MCNC: 123 MG/DL (ref 65–140)
POTASSIUM SERPL-SCNC: 3.3 MMOL/L (ref 3.5–5.3)
SODIUM SERPL-SCNC: 138 MMOL/L (ref 136–145)

## 2021-08-03 PROCEDURE — 71046 X-RAY EXAM CHEST 2 VIEWS: CPT

## 2021-08-03 PROCEDURE — 99232 SBSQ HOSP IP/OBS MODERATE 35: CPT | Performed by: INTERNAL MEDICINE

## 2021-08-03 PROCEDURE — 80048 BASIC METABOLIC PNL TOTAL CA: CPT | Performed by: PHYSICIAN ASSISTANT

## 2021-08-03 PROCEDURE — 99233 SBSQ HOSP IP/OBS HIGH 50: CPT | Performed by: INTERNAL MEDICINE

## 2021-08-03 PROCEDURE — G1004 CDSM NDSC: HCPCS

## 2021-08-03 PROCEDURE — A9540 TC99M MAA: HCPCS

## 2021-08-03 PROCEDURE — 78580 LUNG PERFUSION IMAGING: CPT

## 2021-08-03 PROCEDURE — 99223 1ST HOSP IP/OBS HIGH 75: CPT | Performed by: INTERNAL MEDICINE

## 2021-08-03 RX ORDER — CARVEDILOL 3.12 MG/1
3.12 TABLET ORAL 2 TIMES DAILY WITH MEALS
Status: DISCONTINUED | OUTPATIENT
Start: 2021-08-03 | End: 2021-08-06 | Stop reason: HOSPADM

## 2021-08-03 RX ORDER — CARVEDILOL 12.5 MG/1
12.5 TABLET ORAL 2 TIMES DAILY WITH MEALS
Status: DISCONTINUED | OUTPATIENT
Start: 2021-08-03 | End: 2021-08-03

## 2021-08-03 RX ORDER — POTASSIUM CHLORIDE 20 MEQ/1
40 TABLET, EXTENDED RELEASE ORAL ONCE
Status: COMPLETED | OUTPATIENT
Start: 2021-08-03 | End: 2021-08-03

## 2021-08-03 RX ORDER — CARVEDILOL 3.12 MG/1
6.25 TABLET ORAL ONCE
Status: DISCONTINUED | OUTPATIENT
Start: 2021-08-03 | End: 2021-08-03

## 2021-08-03 RX ORDER — AMILORIDE HYDROCHLORIDE 5 MG/1
5 TABLET ORAL DAILY
Status: DISCONTINUED | OUTPATIENT
Start: 2021-08-03 | End: 2021-08-04

## 2021-08-03 RX ORDER — TORSEMIDE 20 MG/1
10 TABLET ORAL DAILY
Status: DISCONTINUED | OUTPATIENT
Start: 2021-08-04 | End: 2021-08-06 | Stop reason: HOSPADM

## 2021-08-03 RX ADMIN — MORPHINE SULFATE 30 MG: 15 TABLET, EXTENDED RELEASE ORAL at 21:23

## 2021-08-03 RX ADMIN — PRIMIDONE 50 MG: 50 TABLET ORAL at 21:23

## 2021-08-03 RX ADMIN — PRAVASTATIN SODIUM 20 MG: 10 TABLET ORAL at 16:21

## 2021-08-03 RX ADMIN — PRIMIDONE 50 MG: 50 TABLET ORAL at 08:57

## 2021-08-03 RX ADMIN — ASPIRIN 81 MG CHEWABLE TABLET 81 MG: 81 TABLET CHEWABLE at 08:56

## 2021-08-03 RX ADMIN — POTASSIUM CHLORIDE 10 MEQ: 750 TABLET, EXTENDED RELEASE ORAL at 08:56

## 2021-08-03 RX ADMIN — HEPARIN SODIUM 5000 UNITS: 5000 INJECTION INTRAVENOUS; SUBCUTANEOUS at 05:32

## 2021-08-03 RX ADMIN — POLYETHYLENE GLYCOL 3350 17 G: 17 POWDER, FOR SOLUTION ORAL at 08:56

## 2021-08-03 RX ADMIN — DOCUSATE SODIUM AND SENNOSIDES 1 TABLET: 8.6; 5 TABLET, FILM COATED ORAL at 16:21

## 2021-08-03 RX ADMIN — CARVEDILOL 6.25 MG: 6.25 TABLET, FILM COATED ORAL at 08:56

## 2021-08-03 RX ADMIN — APIXABAN 10 MG: 5 TABLET, FILM COATED ORAL at 17:35

## 2021-08-03 RX ADMIN — SALMETEROL XINAFOATE 1 PUFF: 50 POWDER, METERED ORAL; RESPIRATORY (INHALATION) at 17:36

## 2021-08-03 RX ADMIN — SALMETEROL XINAFOATE 1 PUFF: 50 POWDER, METERED ORAL; RESPIRATORY (INHALATION) at 08:56

## 2021-08-03 RX ADMIN — POTASSIUM CHLORIDE 40 MEQ: 1500 TABLET, EXTENDED RELEASE ORAL at 17:47

## 2021-08-03 RX ADMIN — MORPHINE SULFATE 30 MG: 15 TABLET, EXTENDED RELEASE ORAL at 08:56

## 2021-08-03 RX ADMIN — CARVEDILOL 3.12 MG: 3.12 TABLET, FILM COATED ORAL at 17:35

## 2021-08-03 RX ADMIN — LEVOTHYROXINE SODIUM 75 MCG: 75 TABLET ORAL at 05:32

## 2021-08-03 RX ADMIN — TIOTROPIUM BROMIDE 18 MCG: 18 CAPSULE ORAL; RESPIRATORY (INHALATION) at 08:56

## 2021-08-03 NOTE — ASSESSMENT & PLAN NOTE
Malnutrition Findings:   Adult Malnutrition type: Acute illness  Adult Degree of Malnutrition: Other severe protein calorie malnutrition (Sever pro/rené malnutrition r/t Metastatic lung Ca as evidenced by 16lb (9%) unplanned wt loss since 7/2/21, consuming <75%energy intake compared to est energy needs in 1 month  Treated with Dysphagia lvl 3 diet and Ensure Compact tid)    BMI Findings: Body mass index is 25 84 kg/m²       · Continue dysphagia mechanical soft diet, thin liquids and nutritional supplements  · Patient had better appetite today, ate most of breakfast and feels better this am

## 2021-08-03 NOTE — CASE MANAGEMENT
LOS: 2 GMLOS: 4 RISK OF READMISSION: 30 BUNDLE: NO    CM met with pt, his spouse and his children at bedside  Pt lives in a one level house with his wife  They do have an office on a lower level  Wife thinking of getting a stair glide installed  ADL's are completed independently  Pt occasionally uses a RW for ambulation  They also have grab bars on the toilet and in the shower  PCP identified as Dr Odette Bedoya  Pharmacy identified as [facility] or [facility]  Family prefers HHPT/OT/SN; might be open to  HHC  CM will check with the [facility]  Pt does drive and will have transportation home at dc  Pt's dtr interested in her father being seen by palliative care  She feels that the chemo may not work and worries he won't be comfortable  CM asked the PA to consult palliative so the family can start this conversation  CM will continue to follow for discharge planning needs  A post acute care recommendation was made by your care team for [facility]  Discussed Freedom of Choice with both patient and POA  List of agencies given to both patient and POA via in person  both patient and POA aware the list is custom filtered for them by preference  and that [facility] post acute providers are designated  CM reviewed d/c planning process including the following: identifying help at home, patient preference for d/c planning needs, Discharge Lounge, [facility] Meds to Bed program, availability of treatment team to discuss questions or concerns patient and/or family may have regarding understanding medications and recognizing signs and symptoms once discharged  CM also encouraged patient to follow up with all recommended appointments after discharge  Patient advised of importance for patient and family to participate in managing patients medical well being

## 2021-08-03 NOTE — PLAN OF CARE
Problem: Potential for Falls  Goal: Patient will remain free of falls  Description: INTERVENTIONS:  - Educate patient/family on patient safety including physical limitations  - Instruct patient to call for assistance with activity   - Consult OT/PT to assist with strengthening/mobility   - Keep Call bell within reach  - Keep bed low and locked with side rails adjusted as appropriate  - Keep care items and personal belongings within reach  - Initiate and maintain comfort rounds  - Make Fall Risk Sign visible to staff  - Apply yellow socks and bracelet for high fall risk patients  - Consider moving patient to room near nurses station  Outcome: Progressing     Problem: MOBILITY - ADULT  Goal: Maintain or return to baseline ADL function  Description: INTERVENTIONS:  -  Assess patient's ability to carry out ADLs; assess patient's baseline for ADL function and identify physical deficits which impact ability to perform ADLs (bathing, care of mouth/teeth, toileting, grooming, dressing, etc )  - Assess/evaluate cause of self-care deficits   - Assess range of motion  - Assess patient's mobility; develop plan if impaired  - Assess patient's need for assistive devices and provide as appropriate  - Encourage maximum independence but intervene and supervise when necessary  - Involve family in performance of ADLs  - Assess for home care needs following discharge   - Consider OT consult to assist with ADL evaluation and planning for discharge  - Provide patient education as appropriate  Outcome: Progressing  Goal: Maintains/Returns to pre admission functional level  Description: INTERVENTIONS:  - Perform BMAT or MOVE assessment daily    - Set and communicate daily mobility goal to care team and patient/family/caregiver     - Collaborate with rehabilitation services on mobility goals if consulted  - Out of bed for toileting  - Record patient progress and toleration of activity level   Outcome: Progressing     Problem: Prexisting or High Potential for Compromised Skin Integrity  Goal: Skin integrity is maintained or improved  Description: INTERVENTIONS:  - Identify patients at risk for skin breakdown  - Assess and monitor skin integrity  - Assess and monitor nutrition and hydration status  - Monitor labs   - Assess for incontinence   - Turn and reposition patient  - Assist with mobility/ambulation  - Relieve pressure over bony prominences  - Avoid friction and shearing  - Provide appropriate hygiene as needed including keeping skin clean and dry  - Evaluate need for skin moisturizer/barrier cream  - Collaborate with interdisciplinary team   - Patient/family teaching  - Consider wound care consult   Outcome: Progressing     Problem: SKIN/TISSUE INTEGRITY - ADULT  Goal: Skin Integrity remains intact(Skin Breakdown Prevention)  Description: Assess:  -Assess extremities for adequate circulation and sensation     Bed Management:  -Have minimal linens on bed & keep smooth, unwrinkled  -Change linens as needed when moist or perspiring    Skin Care:  -Avoid use of baby powder, tape, friction and shearing, hot water or constrictive clothing  -Do not massage red bony areas    Next Steps:  Outcome: Progressing  Goal: Incision(s), wounds(s) or drain site(s) healing without S/S of infection  Description: INTERVENTIONS  - Assess and document dressing, incision, wound bed, drain sites and surrounding tissue  - Provide patient and family education  Outcome: Progressing  Goal: Pressure injury heals and does not worsen  Description: Interventions:  - Implement low air loss mattress or specialty surface (Criteria met)  - Apply silicone foam dressing  - Consider nutrition services referral as needed  Outcome: Progressing

## 2021-08-03 NOTE — ASSESSMENT & PLAN NOTE
· Baseline Cr around 1 2, presented on admission at 1 76  · Of note, during last discharge on 07/17/2021 creatinine was 1 7  · Creatinine today is 1 44, no IV diuretics given today  · Continue to monitor renal function daily  · Monitor for retention  · Continue holding Norvasc and clonidine for now to avoid hypotension

## 2021-08-03 NOTE — ASSESSMENT & PLAN NOTE
Wt Readings from Last 3 Encounters:   08/03/21 77 1 kg (169 lb 15 6 oz)   07/16/21 80 2 kg (176 lb 12 9 oz)   07/02/21 84 6 kg (186 lb 6 4 oz)     · 82-year-old male with history of lung cancer, diastolic heart failure, HTN presented with SOB  · Patient previously was admitted here with similar complaints from 7/14-7/17  · 7/16: 2D echo reviewed, EF 58% with grade 1 diastolic dysfunction  · 8/1: ProBNP elevated 1600, similar as previous  · Physical exam: +2 pitting of lower extremity mostly around the ankles still present, more likely due to hypoalbuminemia  · Weight of 179 lb on admission, wife reports baseline weight of 172-174  · Cardiology consulted, saw this am and per cardiology, peripheral edema more suggestive of lymphocytic spread of carcinoma than congestive heart failure   SOB is likely a combination of known lung cancer, acute CHF, deconditioning, and potential PE  · Cardiology plans to begin amiloride and discontinue KCl to improve serum potassium while treating alkalosis, also will increase carvedilol to 12 5 mg BID  · D/C lasix, per cardiology will start torsemide tomorrow   · Of note BP on the lower side at 96/48 at 11am compared to 7am BP at 130/60  · Renal function did show some improvement today, continue monitoring BMP daily  · Continue daily weights  · PT and OT consulted, family considering rehab recommendations at this time from physical therapy or home health

## 2021-08-03 NOTE — PROGRESS NOTES
[address] [facility]  Progress Note - Minh Delacruz 1935, 82 y o  male MRN: [id number]  Unit/Bed#: E2 -01 Encounter: [id number]  Primary Care Provider: Rosamaria Vinson DO     * Acute on chronic diastolic (congestive) heart failure (HCC)  Assessment & Plan  Wt Readings from Last 3 Encounters:   08/03/21 77 1 kg (169 lb 15 6 oz)   07/16/21 80 2 kg (176 lb 12 9 oz)   07/02/21 84 6 kg (186 lb 6 4 oz)     · 21-year-old male with history of lung cancer, diastolic heart failure, HTN presented with SOB  · Patient previously was admitted here with similar complaints from 7/14-7/17  · 7/16: 2D echo reviewed, EF 58% with grade 1 diastolic dysfunction  · 8/1: ProBNP elevated 1600, similar as previous  · Physical exam: +2 pitting of lower extremity mostly around the ankles still present, more likely due to hypoalbuminemia  · Weight of 179 lb on admission, wife reports baseline weight of 172-174  · Cardiology consulted, saw this am and per cardiology, peripheral edema more suggestive of lymphocytic spread of carcinoma than congestive heart failure  SOB is likely a combination of known lung cancer, acute CHF, deconditioning, and potential PE  · Cardiology plans to begin amiloride and discontinue KCl to improve serum potassium while treating alkalosis, also will increase carvedilol to 12 5 mg BID  · D/C lasix, per cardiology will start torsemide tomorrow   · Of note BP on the lower side at 96/48 at 11am compared to 7am BP at 130/60  · Renal function did show some improvement today, continue monitoring BMP daily  · Continue daily weights  · PT and OT consulted, family considering rehab recommendations at this time from physical therapy or home health        Deep vein thrombosis (DVT) of popliteal vein of right lower extremity (HCC)  Assessment & Plan  · 8/2 B/L doppler US LEs: there is evidence of non-occlusive deep vein thrombosis noted in 1 of 2 popliteal veins  , likely chronic  · Patient at high risk of clotting due to cancer diagnosis, PE possible  · D/C heparin prophylaxis and start loading dose of Eliquis at 10 mg BID for 7 days, then patient will be taking 5 mg BID  Pt will need to continue blood thinners upon discharge, and patient's Cr continues to improve so benefits outweigh risk  · Monitor patient for SOB, worsening respiratory status  · D/C mechanical VTE prophylaxis/SCDs    Severe protein-calorie malnutrition (HCC)  Assessment & Plan  Malnutrition Findings:   Adult Malnutrition type: Acute illness  Adult Degree of Malnutrition: Other severe protein calorie malnutrition (Sever pro/rené malnutrition r/t Metastatic lung Ca as evidenced by 16lb (9%) unplanned wt loss since 7/2/21, consuming <75%energy intake compared to est energy needs in 1 month  Treated with Dysphagia lvl 3 diet and Ensure Compact tid)    BMI Findings: Body mass index is 25 84 kg/m²       · Continue dysphagia mechanical soft diet, thin liquids and nutritional supplements  · Patient had better appetite today, ate most of breakfast and feels better this am      Acute kidney injury [facility])  Assessment & Plan  · Baseline Cr around 1 2, presented on admission at 1 76  · Of note, during last discharge on 07/17/2021 creatinine was 1 7  · Creatinine today is 1 44, no IV diuretics given today  · Continue to monitor renal function daily  · Monitor for retention  · Continue holding Norvasc and clonidine for now to avoid hypotension    Cancer related pain  Assessment & Plan  · Continue current pain regimen, patient does not report much pain  · Patient's family asked about a palliative care consult, likely not appropriate at this time, as patient continues to want cancer treatment and has relief with his current pain management  · Recommend having a discussion with patient and patient's family about goals of care, as daughter expresses concerns about quality of life related to the cancer and the cancer treatment, and patient wishes to continue treatment at this time   Patient today had some changes to medication regimen from cardiology and new discovery of a DVT, more appropriate to discuss at a later day    Malignant neoplasm of upper lobe of right lung [facility])  Assessment & Plan  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; 6/2/2021 Initial Diagnosis   · Follows [facility], receiving palliative chemo on keytruda and radiation  · Will ultimately get gamma knife for brain meds per [facility] records  · Received 2nd dose was this past weekend  · Patient's daughter wishes to transfer care to [place] [address] oncology  · Oncology consulted and saw pt today, spoke with oncology and they noted that patient and patient's family at this time need reassurance and guidance on the goals of care given all of his active medical problems occurring along with his cancer, will discuss further when patient gets discharged  · Oncology will schedule outpatient follow-up    Hypertension  Assessment & Plan  · Cardiology increased Coreg to 12 5mg  · Holding Norvasc, clondine due to low blood pressure while diuresing  · BP lower in later morning compared to earlier in am, will monitor   · May be able to simplify regimen upon discharge    Tremor, essential  Assessment & Plan  · Continue primidone    Pressure injury of skin of sacral region  Assessment & Plan  · Wound sacral ulcer present on admission  · Wound care consulted, saw pt yesterday and will follow throughout admission  · Plan to follow-up outpatient for continued wound care  · Patient also has sutures on forehead, reports there was a skin mass that his plastic surgeon removed, was scheduled to have sutures removed this Friday 8/6  · Patient's family requested plastic surgery to look at sutures, plastic surgery consulted    Acute respiratory failure with hypoxia [facility])  Assessment & Plan  · Patient denies significant SOB/difficulty breathing  · O2 sats at 92% or greater on room air  · DVT found in right LE last night, non-occluding, diagnosis of pulmonary embolism possible  · Unable to perform CTA 2/2 kidney function  · Would check home oxygen evaluation prior to discharge    VTE Pharmacologic Prophylaxis: VTE Score: 9 On Eliquis and D/C SCDs 2/2 DVT in R LE    Patient Centered Rounds: I performed bedside rounds with nursing staff today  Discussions with Specialists or Other Care Team Provider: Oncology    Education and Discussions with Family / Patient: Updated  (wife and daughter) at bedside  Time Spent for Care: 30 minutes  More than 50% of total time spent on counseling and coordination of care as described above  Current Length of Stay: 2 day(s)  Current Patient Status: Inpatient   Certification Statement: The patient will continue to require additional inpatient hospital stay due to continued management of multiple medical conditions  Discharge Plan: Anticipate discharge in 48-72 hrs to home with home services  Code Status: Level 1 - Full Code    Subjective:   Patient was seen at bedside this a m , wife and daughter present  Patient was alert, reports that he was tired today, not wanting to move around too much today  Patient did have more of an appetite this morning, ate most of his breakfast   Patient's daughter reports concern with the patient's medical management and goals of care during stay and once ready for discharge outpatient  Objective:     Vitals:   Temp (24hrs), Av 8 °F (36 6 °C), Min:97 3 °F (36 3 °C), Max:98 4 °F (36 9 °C)    Temp:  [97 3 °F (36 3 °C)-98 4 °F (36 9 °C)] 97 8 °F (36 6 °C)  HR:  [] 86  Resp:  [16-18] 18  BP: ()/(48-70) 113/53  SpO2:  [92 %-96 %] 93 %  Body mass index is 25 84 kg/m²  Input and Output Summary (last 24 hours):   No intake or output data in the 24 hours ending 21 1446    Physical Exam:   Physical Exam  Constitutional:       General: He is not in acute distress       Appearance: He is not ill-appearing or diaphoretic  HENT:      Head: Normocephalic  Comments: 1-2 inch incision with sutures on forehead, clean dry and well healed     Mouth/Throat:      Mouth: Mucous membranes are dry  Eyes:      Extraocular Movements: Extraocular movements intact  Conjunctiva/sclera: Conjunctivae normal    Cardiovascular:      Rate and Rhythm: Normal rate and regular rhythm  Pulses: Normal pulses  Heart sounds: Normal heart sounds  No murmur heard  No friction rub  No gallop  Pulmonary:      Effort: Pulmonary effort is normal       Breath sounds: Rales present  No wheezing  Comments: Mild rales noted on bilateral lower lobes  Abdominal:      General: There is no distension  Palpations: Abdomen is soft  Tenderness: There is no abdominal tenderness  Musculoskeletal:         General: Swelling and tenderness present  Comments: 2+ bilateral pitting edema on ankles, mild tenderness to palpation of ankles   Skin:     General: Skin is warm  Capillary Refill: Capillary refill takes less than 2 seconds  Findings: No erythema or lesion  Neurological:      General: No focal deficit present  Mental Status: He is alert  Psychiatric:         Mood and Affect: Mood normal          Behavior: Behavior normal       *Physical Exam  Constitutional:       General: He is not in acute distress  Appearance: He is not ill-appearing or diaphoretic  HENT:      Head: Normocephalic  Comments: 1-2 inch incision with sutures on forehead, clean dry and well healed     Mouth/Throat:      Mouth: Mucous membranes are dry  Eyes:      Extraocular Movements: Extraocular movements intact  Conjunctiva/sclera: Conjunctivae normal    Cardiovascular:      Rate and Rhythm: Normal rate and regular rhythm  Pulses: Normal pulses  Heart sounds: Normal heart sounds  No murmur heard  No friction rub  No gallop      Pulmonary:      Effort: Pulmonary effort is normal  Breath sounds: Rales present  No wheezing  Comments: Mild rales noted on bilateral lower lobes  Abdominal:      General: There is no distension  Palpations: Abdomen is soft  Tenderness: There is no abdominal tenderness  Musculoskeletal:         General: Swelling and tenderness present  Comments: 2+ bilateral pitting edema on ankles, mild tenderness to palpation of ankles   Skin:     General: Skin is warm  Capillary Refill: Capillary refill takes less than 2 seconds  Findings: No erythema or lesion  Neurological:      General: No focal deficit present  Mental Status: He is alert     Psychiatric:         Mood and Affect: Mood normal          Behavior: Behavior normal      **    Additional Data:     Labs:  Results from last 7 days   Lab Units 08/02/21  0619   WBC Thousand/uL 7 14   HEMOGLOBIN g/dL 11 5*   HEMATOCRIT % 35 0*   PLATELETS Thousands/uL 195   NEUTROS PCT % 85*   LYMPHS PCT % 4*   MONOS PCT % 8   EOS PCT % 2     Results from last 7 days   Lab Units 08/03/21  0431 08/01/21  1253   SODIUM mmol/L 138 140   POTASSIUM mmol/L 3 3* 3 4*   CHLORIDE mmol/L 96* 98*   CO2 mmol/L 36* 34*   BUN mg/dL 57* 68*   CREATININE mg/dL 1 44* 1 76*   ANION GAP mmol/L 6 8   CALCIUM mg/dL 9 0 8 5   ALBUMIN g/dL  --  1 8*   TOTAL BILIRUBIN mg/dL  --  0 33   ALK PHOS U/L  --  100   ALT U/L  --  151*   AST U/L  --  49*   GLUCOSE RANDOM mg/dL 123 147*     Results from last 7 days   Lab Units 08/01/21  1253   INR  1 47*                   Lines/Drains:  Invasive Devices     Peripheral Intravenous Line            Peripheral IV 08/01/21 Right Forearm 2 days                      Imaging: Reviewed radiology reports from this admission including: chest xray, ultrasound(s) and ECHO    Recent Cultures (last 7 days):         Last 24 Hours Medication List:   Current Facility-Administered Medications   Medication Dose Route Frequency Provider Last Rate    albuterol  2 puff Inhalation Q4H PRN Laura Lees MD  AMILoride  5 mg Oral Daily Neil Kehr, MD      apixaban  10 mg Oral BID Virginia Hansen PA-C      aspirin  81 mg Oral Daily Bassem Valencia MD      carvedilol  12 5 mg Oral BID With Meals Neil Kehr, MD      levothyroxine  75 mcg Oral Early Morning Bassem Valencia MD      morphine  30 mg Oral Q12H [facility] Bassem Valencia MD      oxyCODONE  10 mg Oral Q4H PRN Bassem Valencia MD      polyethylene glycol  17 g Oral Daily Bassem Valencia MD      pravastatin  20 mg Oral Daily With Mac Mario MD      primidone  50 mg Oral Q12H [facility] Bassem Valencia MD      prochlorperazine  10 mg Oral Q6H PRN Bassem Valencia MD      tiotropium  18 mcg Inhalation Daily Bassem Valencia MD      And    salmeterol  1 puff Inhalation BID Bassem Valencia MD      senna-docusate sodium  1 tablet Oral Before Mac Mario MD      [place] ON 8/4/2021] torsemide  10 mg Oral Daily Neil Kehr, MD          Today, Patient Was Seen By: Virginia Hansen PA-C    **Please Note: This note may have been constructed using a voice recognition system  **Date and time admitted to hospital: 8/1/2021 12:39 PM

## 2021-08-03 NOTE — PROGRESS NOTES
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  5  Right lower extremity venous insufficiency   6  Bilateral nonobstructive carotid artery disease  7  Hypothyroidism  8  Asthma  9  Hypokalemia and alkalosis  10  Acute on chronic renal insufficiency, improving, GFR 35 ->  44    Plan /discussion:   1  I am not sure how much heart failure is playing a role in patient's respiratory failure  With an albumin of 1 8, he will always have some degree of peripheral edema which is presently not severe  Review of patient's chest x-ray to me is more suggestive of lymphocytic spread of carcinoma than congestive heart failure  Examination demonstrates no rales  BNP of 1600 has not changed since July and at patient's age is not very abnormal  2  Will begin amiloride and discontinue KCl to improve serum potassium while treating alkalosis  3  Will monitor renal function carefully   4  Will tentatively begin  torsemide 10 mg daily p o  tomorrow  5  Will increase carvedilol to 12 5 mg b i d  6  Will check PA and lateral chest x-ray to better assess the likelihood of heart failure  Interval history:   patient states that his shortness of breath is stable  Oxygen saturation has been 92% or greater over the last 24 hours  Patient states that he is not short of breath at rest but walking is very difficult      PROBLEM LIST:    Patient Active Problem List    Diagnosis Date Noted    Acute on chronic diastolic (congestive) heart failure ([facility] [address] ) 08/01/2021    Acute respiratory failure with hypoxia ([facility] [address] ) 08/01/2021    Pressure injury of skin of sacral region 08/01/2021    Severe protein-calorie malnutrition ([facility] [address] ) 07/15/2021    Other dysphagia 07/14/2021    Acute respiratory insufficiency 07/14/2021    Malignant neoplasm of upper lobe of right lung ([facility] [address] ) 07/14/2021    Cancer related pain 07/14/2021    Hypokalemia 07/14/2021    Acute kidney injury ([facility] [address] ) 07/14/2021    Neoplasm of unspecified behavior of bone, soft tissue, 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/2020    Dystonia 02/27/2020    Tremor, essential 07/31/2019    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    Asthma 04/25/2019    Hypertension 04/25/2019    Lateral epicondylitis 04/25/2019    Localized primary osteoarthritis of wrist 04/25/2019    Lumbosacral spondylosis without myelopathy 04/25/2019    Primary osteoarthritis of left shoulder 04/25/2019    Carpal tunnel syndrome 10/05/2017    Cubital tunnel syndrome 10/05/2017    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    Neuropathy of both feet 11/20/2015    Benign essential hypertension 06/22/2010    ED (erectile dysfunction) of organic origin 06/22/2010    Lumbar spondylosis with myelopathy 07/28/2008    Spinal stenosis of lumbar region 04/23/2008    Low back pain 04/03/2008       Vitals: /60 (BP Location: Right arm)   Pulse 102   Temp 97 5 °F (36 4 °C) (Tympanic)   Resp 16   Wt 77 1 kg (169 lb 15 6 oz)   SpO2 92%   BMI 25 84 kg/m²   PREVIOUS WEIGHTS:   Wt Readings from Last 5 Encounters:   08/03/21 77 1 kg (169 lb 15 6 oz)   07/16/21 80 2 kg (176 lb 12 9 oz)   07/02/21 84 6 kg (186 lb 6 4 oz)   02/23/21 87 5 kg (193 lb)   02/10/21 91 4 kg (201 lb 9 6 oz)        Labs/Data:        Results from last 7 days   Lab Units 08/02/21  0619 08/01/21  1659 08/01/21  1253   WBC Thousand/uL 7 14  --  8 60   HEMOGLOBIN g/dL 11 5*  --  10 7*   HEMATOCRIT % 35 0*  --  32 1*   MCV fL 95  --  93   MCH pg 31 1  --  30 9   MCHC g/dL 32 9  --  33 3   PLATELETS Thousands/uL 195 202 192     Results from last 7 days   Lab Units 08/03/21  0431 08/02/21  0619 08/01/21  1253   EGFR ml/min/1 73sq m 44 40 35   SODIUM mmol/L 138 138 140   POTASSIUM mmol/L 3 3* 4 2 3 4*   CHLORIDE mmol/L 96* 98* 98*   CO2 mmol/L 36* 32 34*   BUN mg/dL 57* 65* 68*   CREATININE mg/dL 1 44* 1 58* 1 76*     Results from last 7 days   Lab Units 08/03/21  0431 08/02/21  0619 08/01/21  1253   CALCIUM mg/dL 9 0 9 1 8 5   AST U/L  --   --  49*   ALT U/L  --   --  151*   ALK PHOS U/L  --   --  100   MAGNESIUM mg/dL  --  2 4  --      Results from last 7 days   Lab Units 08/01/21  1253   TROPONIN I ng/mL <0 02     Lab Results   Component Value Date    NTBNP 1,618 (H) 08/01/2021    NTBNP 1,605 (H) 07/14/2021     Lab Results   Component Value Date    HGBA1C 6 8 (H) 07/23/2021     No results found for: TSH  No results found for: DIGOXIN  Results from last 7 days   Lab Units 08/01/21  1253   INR  1 47*   PROTIME seconds 17 5*          Current Facility-Administered Medications:     albuterol (PROVENTIL HFA,VENTOLIN HFA) inhaler 2 puff, 2 puff, Inhalation, Q4H PRN, Ye Donovan MD    aspirin chewable tablet 81 mg, 81 mg, Oral, Daily, Ye Donovan MD, 81 mg at 08/02/21 0837    carvedilol (COREG) tablet 6 25 mg, 6 25 mg, Oral, BID With Meals, Ye Donovan MD, 6 25 mg at 08/02/21 1815    heparin (porcine) subcutaneous injection 5,000 Units, 5,000 Units, Subcutaneous, Q8H [address], 5,000 Units at 08/03/21 0532 **AND** [COMPLETED] Platelet count, , , Once, Ye Donovan MD    levothyroxine tablet 75 mcg, 75 mcg, Oral, Early Morning, Ye Donovan MD, 75 mcg at 08/03/21 0532    morphine (MS CONTIN) ER tablet 30 mg, 30 mg, Oral, Q12H [address], Ye Donovan MD, 30 mg at 08/02/21 2204    oxyCODONE (ROXICODONE) immediate release tablet 10 mg, 10 mg, Oral, Q4H PRN, Ye Donovan MD    polyethylene glycol (MIRALAX) packet 17 g, 17 g, Oral, Daily, Ye Donovan MD, 17 g at 08/02/21 9200    potassium chloride (K-DUR,KLOR-CON) CR tablet 10 mEq, 10 mEq, Oral, Daily, Ye Donovan MD, 10 mEq at 08/02/21 0834    pravastatin (PRAVACHOL) tablet 20 mg, 20 mg, Oral, Daily With Sujey Bo MD, 20 mg at 08/02/21 1815    primidone (MYSOLINE) tablet 50 mg, 50 mg, Oral, Q12H [facility], Anish Jain MD, 50 mg at 08/02/21 2204    prochlorperazine (COMPAZINE) tablet 10 mg, 10 mg, Oral, Q6H PRN, Anish Jain MD    tiotropium [facility]) capsule for inhaler 18 mcg, 18 mcg, Inhalation, Daily, 18 mcg at 08/02/21 0838 **AND** salmeterol (Serevent Diskus) 50 mcg/dose inhaler 1 puff, 1 puff, Inhalation, BID, Anish Jain MD, 1 puff at 08/02/21 1816    senna-docusate sodium (SENOKOT S) 8 6-50 mg per tablet 1 tablet, 1 tablet, Oral, Before Cristina Joseph MD, 1 tablet at 08/02/21 1815  Allergies   Allergen Reactions    Lisinopril      Swelling of tongue    Sulfa Antibiotics Other (See Comments)     pancreatitis    Other reaction(s): Other (Please comment)  Pancreas destroy itself    Zestoretic [Lisinopril-Hydrochlorothiazide]      Pancreatitis         Intake/Output Summary (Last 24 hours) at 8/3/2021 0853  Last data filed at 8/2/2021 1300  Gross per 24 hour   Intake 240 ml   Output --   Net 240 ml       Invasive Devices     Peripheral Intravenous Line            Peripheral IV 08/01/21 Right Forearm 1 day                Review of Systems   Constitutional: Negative for activity change  Respiratory: Positive for shortness of breath  Negative for cough, chest tightness and wheezing  Cardiovascular: Negative for chest pain, palpitations and leg swelling  Musculoskeletal: Negative for gait problem  Skin: Negative for color change  Neurological: Negative for dizziness, tremors, syncope, weakness, light-headedness and headaches  Psychiatric/Behavioral: Negative for agitation and confusion  Physical Exam  Vitals reviewed  Constitutional:       General: He is not in acute distress  Appearance: He is well-developed  HENT:      Head: Normocephalic and atraumatic  Neck:      Thyroid: No thyromegaly  Vascular: No carotid bruit or JVD  Trachea: No tracheal deviation  Cardiovascular:      Rate and Rhythm: Normal rate and regular rhythm        Pulses: Normal pulses  Heart sounds: Normal heart sounds  No murmur heard  No friction rub  No gallop  Pulmonary:      Effort: Pulmonary effort is normal  No respiratory distress  Breath sounds: Normal breath sounds  No wheezing, rhonchi or rales  Comments: Coarse bilateral breath sounds  No rales, wheezes or rhonchi  Chest:      Chest wall: No tenderness  Abdominal:      General: Bowel sounds are normal  There is no distension  Palpations: Abdomen is soft  Tenderness: There is no abdominal tenderness  Musculoskeletal:      Cervical back: Normal range of motion and neck supple  Right lower leg: Edema present  Left lower leg: Edema present  Comments: 1+ bilateral pretibial edema  Skin:     General: Skin is warm and dry  Neurological:      General: No focal deficit present  Mental Status: He is alert and oriented to person, place, and time  Gait: Gait normal    Psychiatric:         Mood and Affect: Mood normal          Behavior: Behavior normal          Thought Content: Thought content normal          Judgment: Judgment normal            --------------------------------------------------------------------------------  ECHO:   Results for orders placed during the hospital encounter of 21    Echo complete with contrast if indicated    Narrative  [address]  [facility], [address] [facility]  [phone]    Transthoracic Echocardiogram  2D, M-mode, Doppler, and Color Doppler    Study date:  2021    Patient: Nicolina Babinski  MR number: [id number]  Account number: [de-identified]  : 1935  Age: 82 years  Gender: Male  Status: Inpatient  Location: Bedside  Height: 68 in  Weight: 177 5 lb  BP: 129/ 61 mmHg    Indications: Shortness of breath      Diagnoses: R06 02 - Shortness of breath    Sonographer:  Martha Zapata RDCS  Primary Physician:  Merary Mock DO  Referring Physician:  Lucila Hoang DO  Group:  [facility]  Interpreting Physician:  Travis Alexander DO    SUMMARY    SUMMARY:  1  This is a technically difficult study  2  Left ventricle is normal size and function  Left ventricular ejection fraction is estimated at 58%  3  Grade 1 diastolic dysfunction  4  There are no obvious high-grade regional wall motion abnormalities  Endocardium still mildly limited due to poor acoustic windows despite use of echo contrast  5  Right atrium is mildly dilated  6  Aortic valve is sclerotic with adequate separation  7  Trace mitral regurgitation  Mild mitral annular calcification  8  Trace tricuspid regurgitation pulmonary artery systolic pressure is estimated at 25-30 mm Hg  9  Aortic root is mildly dilated 3 8 cm  10  There is no study for comparison    SUMMARY MEASUREMENTS  2D measurements:  Unspecified Anatomy:   %FS was 36 3 %  Ao Diam was 3 8 cm  Ao asc was 3 4 cm   EDV(Teich) was 101 2 ml   EF(Teich) was 66 %  ESV(Teich) was 34 4 ml  IVSd was 0 9 cm  LA Area was 17 cm2  LA Diam was 2 9 cm  LVIDd was 4 7 cm  LVIDs  was 3 cm  LVPWd was 0 9 cm  RA Area was 20 cm2  RVIDd was 4 5 cm  RWT was 0 4    SV(Teich) was 66 7 ml   CW measurements:  Unspecified Anatomy:   TR Vmax was 2 4 m/s  TR maxPG was 23 8 mmHg  MM measurements:  Unspecified Anatomy:   TAPSE was 2 4 cm  PW measurements:  Unspecified Anatomy:   E' Sept was 0 1 m/s  E/E' Sept was 10 9   MV A Ivan was 1 m/s  MV Dec [place] was 5 m/s2  MV DecT was 135 1 ms   MV E Ivan was 0 7 m/s  MV E/A Ratio was 0 7   MV PHT was 39 2 ms  MVA By PHT was 5 6 cm2  HISTORY: PRIOR HISTORY: Cancer  Hypertension  Hyperlipidemia  Hypothyroid  Asthma  PROCEDURE: The procedure was performed at the bedside  This was a routine study  The transthoracic approach was used  The study included complete 2D imaging, M-mode, complete spectral Doppler, and color Doppler  The heart rate was 97 bpm,  at the start of the study   Images were obtained from the parasternal, apical, subcostal, and suprasternal notch acoustic windows  Intravenous contrast ( 0 4 ml Definity in NSS) was administered to opacify the left ventricle  Echocardiographic views were limited due to poor acoustic window availability, decreased penetration, and lung interference  This was a technically difficult study  LEFT VENTRICLE: Left ventricle is normal in size and function  Left ventricular ejection fraction is estimated at 58%  Normal wall thickness  Grade 1 diastolic dysfunction  There are no obvious high-grade regional wall motion  abnormalities  RIGHT VENTRICLE: Right ventricle is mildly dilated with normal systolic function  LEFT ATRIUM: Left atrium is normal in size  ATRIAL SEPTUM: The interatrial septum appears to be grossly normal without evidence of shunting by color-flow Doppler  RIGHT ATRIUM: Right atrium is mildly dilated  MITRAL VALVE: Mitral valve opens well  Fibrocalcific changes of the mitral valve  Mild mitral annular calcification  No evidence of mitral stenosis  Trace mitral regurgitation    AORTIC VALVE: Aortic valve is sclerotic, but not well visualized  No evidence of aortic stenosis or aortic regurgitation  TRICUSPID VALVE: Tricuspid valve opens well  Trace tricuspid regurgitation  Pulmonary systolic pressures estimated at 25-30 mm Hg    PULMONIC VALVE: Pulmonic valve is not well visualized  No evidence of pulmonic stenosis or pulmonic regurgitation  PERICARDIUM: There is no evidence of significant pericardial effusion  AORTA: Aortic root is mildly dilated 3 8 cm  Ascending aorta is normal in size  SYSTEMIC VEINS: IVC: IVC is normal size with greater than 50% respirophasic collapse      SYSTEM MEASUREMENT TABLES    2D  %FS: 36 3 %  Ao Diam: 3 8 cm  Ao asc: 3 4 cm  EDV(Teich): 101 2 ml  EF(Teich): 66 %  ESV(Teich): 34 4 ml  IVSd: 0 9 cm  LA Area: 17 cm2  LA Diam: 2 9 cm  LVIDd: 4 7 cm  LVIDs: 3 cm  LVPWd: 0 9 cm  LVd Mass: 155 5 g  LVd Mass Index: 79 7 g/m2  RA Area: 20 cm2  RVIDd: 4 5 cm  RWT: 0 4  SV(Teich): 66 7 ml    CW  TR Vmax: 2 4 m/s  TR maxP 8 mmHg    MM  TAPSE: 2 4 cm    PW  E' Sept: 0 1 m/s  E/E' Sept: 10 9  MV A Ivan: 1 m/s  MV Dec [place]: 5 m/s2  MV DecT: 135 1 ms  MV E Ivan: 0 7 m/s  MV E/A Ratio: 0 7  MV PHT: 39 2 ms  MVA By PHT: 5 6 cm2    [facility]redited [facility]    Prepared and electronically signed by    Sebastian Ziegler DO  Signed 2021 17:20:58      ======================================================     Imaging:   I have personally reviewed pertinent reports  EKG: Sinus tachycardia with frequent premature atrial contractions and occasional premature ventricular contractions  Rare ventricular couplets

## 2021-08-03 NOTE — ASSESSMENT & PLAN NOTE
· 8/2 B/L doppler US LEs: there is evidence of non-occlusive deep vein thrombosis noted in 1 of 2 popliteal veins  , likely chronic  · Patient at high risk of clotting due to cancer diagnosis, PE possible  · D/C heparin prophylaxis and start loading dose of Eliquis at 10 mg BID for 7 days, then patient will be taking 5 mg BID   Pt will need to continue blood thinners upon discharge, and patient's Cr continues to improve so benefits outweigh risk  · Monitor patient for SOB, worsening respiratory status  · D/C mechanical VTE prophylaxis/SCDs

## 2021-08-03 NOTE — ASSESSMENT & PLAN NOTE
· Cardiology increased Coreg to 12 5mg  · Holding Norvasc, clondine due to low blood pressure while diuresing  · BP lower in later morning compared to earlier in am, will monitor   · May be able to simplify regimen upon discharge

## 2021-08-03 NOTE — ASSESSMENT & PLAN NOTE
· Patient denies significant SOB/difficulty breathing  · O2 sats at 92% or greater on room air  · DVT found in right LE last night, non-occluding, diagnosis of pulmonary embolism possible  · Unable to perform CTA 2/2 kidney function  · Would check home oxygen evaluation prior to discharge

## 2021-08-03 NOTE — CONSULTS
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 approximately May 2021  He is followed by [address] [facility] where he has been receiving pembrolizumab and radiation  His wife states that they were treated with pembrolizumab as it was felt that he would be able to tolerate any potential side effects  He has received two doses to date, with the most recent dose being on July 31st   He also underwent radiation to his chest as well as back, as he was having back pain  He is also due for gamma knife to his brain lesions, however he was unable to to get it as planned as he was sick at the time  He has follow-up with Dr Edwina Adams on August 6, 2021  He presented to the ED this admission for shortness of breath that started the night before coming to the emergency room and just continue to get worse  He was recently hospitalized with similar symptoms thought to be due to congestive heart failure as he responded to diuretics  His wife states that his last admission his potassium was low and he had filled up with fluid to his knees  She states he did well as soon as soon as he was put on potassium  Mr Derrick Hernandez feels that he just needs to get stronger so that he continue to get treatment and fight his lung cancer  He and his wife both state that his appetite has improved since he has been in the hospital   He is very fatigued and tired  He smoked for about 25 years but has quit and has not smoked for 29 years  They would like to follow-up with [facility] here for his lung cancer treatment moving forward        REVIEW OF SYSTEMS:  Review of Systems   Constitutional: Positive for appetite change and fatigue  Negative for fever  HENT:   Negative for lump/mass  Eyes: Negative for icterus  Respiratory: Positive for shortness of breath  Negative for cough and wheezing  Cardiovascular: Positive for leg swelling (improved)  Gastrointestinal: Negative for abdominal pain, constipation, diarrhea, nausea and vomiting  Genitourinary: Negative for difficulty urinating and hematuria  Musculoskeletal: Positive for back pain and gait problem  Negative for arthralgias and myalgias  Skin: Negative for itching and rash  No new, changing, or concerning lesions  Neurological: Positive for extremity weakness (generalized) and gait problem  Negative for headaches, light-headedness and numbness  Hematological: Negative for adenopathy          MEDICATIONS:    Current Facility-Administered Medications:     albuterol (PROVENTIL HFA,VENTOLIN HFA) inhaler 2 puff, 2 puff, Inhalation, Q4H PRN, Anish Jain MD    AMILoride tablet 5 mg, 5 mg, Oral, Daily, Gelacio Martin MD    [facility]) tablet 10 mg, 10 mg, Oral, BID, Joanna Smith PA-C    aspirin chewable tablet 81 mg, 81 mg, Oral, Daily, Anish Jain MD, 81 mg at 08/03/21 0856    carvedilol (COREG) tablet 12 5 mg, 12 5 mg, Oral, BID With Meals, Gelacio Martin MD    levothyroxine tablet 75 mcg, 75 mcg, Oral, Early Morning, Anish Jain MD, 75 mcg at 08/03/21 0532    morphine (MS CONTIN) ER tablet 30 mg, 30 mg, Oral, Q12H [address], Anish Jain MD, 30 mg at 08/03/21 0856    oxyCODONE (ROXICODONE) immediate release tablet 10 mg, 10 mg, Oral, Q4H PRN, Anish Jain MD    polyethylene glycol (MIRALAX) packet 17 g, 17 g, Oral, Daily, Anish Jain MD, 17 g at 08/03/21 0856    pravastatin (PRAVACHOL) tablet 20 mg, 20 mg, Oral, Daily With Cristina Joseph MD, 20 mg at 08/02/21 1815    primidone (MYSOLINE) tablet 50 mg, 50 mg, Oral, Q12H [address], Anish Jain MD, 50 mg at 08/03/21 0857    prochlorperazine (COMPAZINE) tablet 10 mg, 10 mg, Oral, Q6H PRN, Damon Dudley MD    tiotropium [facility]) capsule for inhaler 18 mcg, 18 mcg, Inhalation, Daily, 18 mcg at 08/03/21 0856 **AND** salmeterol (Serevent Diskus) 50 mcg/dose inhaler 1 puff, 1 puff, Inhalation, BID, Damon Dudley MD, 1 puff at 08/03/21 0856    senna-docusate sodium (SENOKOT S) 8 6-50 mg per tablet 1 tablet, 1 tablet, Oral, Before Destinee Urbano MD, 1 tablet at 08/02/21 1815    [START ON 8/4/2021] torsemide (DEMADEX) tablet 10 mg, 10 mg, Oral, Daily, Renzo Fuentes MD     ALLERGIES:  Allergies   Allergen Reactions    Lisinopril      Swelling of tongue    Sulfa Antibiotics Other (See Comments)     pancreatitis    Other reaction(s):  Other (Please comment)  Pancreas destroy itself    Zestoretic [Lisinopril-Hydrochlorothiazide]      Pancreatitis        ACTIVE PROBLEMS:  Patient Active Problem List   Diagnosis    Asthma    Benign essential hypertension    Carpal tunnel syndrome    Cubital tunnel syndrome    ED (erectile dysfunction) of organic origin    Hypertension    Lateral epicondylitis    Localized primary osteoarthritis of wrist    Low back pain    Lumbar spondylosis with myelopathy    Lumbosacral spondylosis without myelopathy    Neuropathy of both feet    Primary osteoarthritis of left shoulder    Spinal stenosis of lumbar region    Status post total replacement of left shoulder    Tremor, essential    Dystonic tremor    Dystonia    Neoplasm of unspecified behavior of bone, soft tissue, and skin    Other dysphagia    Acute respiratory insufficiency    Malignant neoplasm of upper lobe of right lung (HCC)    Cancer related pain    Hypokalemia    Acute kidney injury ([address] )    Severe protein-calorie malnutrition ([address] )    Acute on chronic diastolic (congestive) heart failure (HCC)    Acute respiratory failure with hypoxia (HCC)    Pressure injury of skin of sacral region          PAST MEDICAL HISTORY:   Past Medical History:   Diagnosis Date    Arthritis     Asthma     Chronic pain disorder     back    Colon polyp     Disease of thyroid gland     hypo    Dystonic tremor     Essential tremor     Hyperlipidemia     Hypertension     Pneumonia     x2    Stroke ([facility] [address] )     unknown and no residual        PAST SURGICAL HISTORY:  Past Surgical History:   Procedure Laterality Date    BACK SURGERY      CATARACT EXTRACTION Left     COLONOSCOPY      JOINT REPLACEMENT Left     reverse shoulder    MOHS RECONSTRUCTION N/A 6/3/2020    Procedure: REPAIR MOHS OF THE NOSE;  Surgeon: Garfield Carson MD;  Location: [address] [place] [address];  Service: Plastics    MS DELAY/SECTN FLAP LID,NOS,EAR,LIP N/A 7/15/2020    Procedure: DIVIDE NASAL FLAP;  Surgeon: Garfield Carson MD;  Location: [address] [place] [address];  Service: Plastics    ROTATOR CUFF REPAIR      SPINE SURGERY      TONSILLECTOMY          SOCIAL HISTORY:  Social History     Socioeconomic History    Marital status: Unknown     Spouse name: None    Number of children: None    Years of education: None    Highest education level: None   Occupational History    None   Tobacco Use    Smoking status: Former Smoker     Types: Cigarettes     Quit date: 1994     Years since quittin 2    Smokeless tobacco: Current User     Types: Chew    Tobacco comment: currently uses chewing tobacco   Vaping Use    Vaping Use: Never used   Substance and Sexual Activity    Alcohol use: Yes     Comment: < 1 drink a month    Drug use: Never    Sexual activity: Not Currently   Other Topics Concern    None   Social History Narrative    DOES NOT CONSUME CAFFEINE     Social Determinants of Health     Financial Resource Strain:     Difficulty of Paying Living Expenses:    Food Insecurity:     Worried About Running Out of Food in the Last Year:     Ran Out of Food in the Last Year:    Transportation Needs:     Lack of Transportation (Medical):      Lack of Transportation (Non-Medical):    Physical Activity:     Days of Exercise per Week:     Minutes of Exercise per Session:    Stress:     Feeling of Stress :    Social Connections:     Frequency of Communication with Friends and Family:     Frequency of Social Gatherings with Friends and Family:     Attends Restorationist Services:     Active Member of Clubs or Organizations:     Attends Club or Organization Meetings:     Marital Status:    Intimate Partner Violence:     Fear of Current or Ex-Partner:     Emotionally Abused:     Physically Abused:     Sexually Abused:         FAMILY HISTORY:  Family History   Problem Relation Age of Onset    No Known Problems Mother     No Known Problems Father         Objective    Objective    PHYSICAL EXAMINATION:   Blood pressure (!) 96/48, pulse 82, temperature 97 8 °F (36 6 °C), temperature source Temporal, resp  rate 18, weight 77 1 kg (169 lb 15 6 oz), SpO2 96 %  Physical Exam  Constitutional:       General: He is not in acute distress  Appearance: Normal appearance  He is not toxic-appearing  Comments: thin   HENT:      Mouth/Throat:      Mouth: Mucous membranes are moist       Pharynx: Oropharynx is clear  Eyes:      General: No scleral icterus  Cardiovascular:      Rate and Rhythm: Normal rate and regular rhythm  Pulses: Normal pulses  Heart sounds: No murmur heard  No friction rub  No gallop  Pulmonary:      Effort: Pulmonary effort is normal  No respiratory distress  Breath sounds: Wheezing (at bases, audible breathing in upper airways ) present  No rales  Abdominal:      General: Bowel sounds are normal  There is no distension  Palpations: Abdomen is soft  There is no mass  Tenderness: There is no abdominal tenderness  There is no rebound  Musculoskeletal:         General: No swelling or tenderness  Right lower leg: Edema (pitting, above ankles) present  Left lower leg: Edema (pitting, above ankles) present     Lymphadenopathy:      Head:      Right side of head: No submandibular, preauricular or posterior auricular adenopathy  Left side of head: No submandibular, preauricular or posterior auricular adenopathy  Cervical: No cervical adenopathy  Right cervical: No superficial or posterior cervical adenopathy  Left cervical: No superficial or posterior cervical adenopathy  Skin:     Findings: No rash  Comments: Well healed surgical scar  No evidence of recurrence at primary site  Neurological:      General: No focal deficit present  Mental Status: He is alert and oriented to person, place, and time  Psychiatric:         Mood and Affect: Mood normal          Behavior: Behavior normal          Thought Content: Thought content normal          Judgment: Judgment normal          I reviewed lab data in the chart      WBC   Date Value Ref Range Status   08/02/2021 7 14 4 31 - 10 16 Thousand/uL Final   08/01/2021 8 60 4 31 - 10 16 Thousand/uL Final   07/17/2021 7 16 4 31 - 10 16 Thousand/uL Final     Hemoglobin   Date Value Ref Range Status   08/02/2021 11 5 (L) 12 0 - 17 0 g/dL Final   08/01/2021 10 7 (L) 12 0 - 17 0 g/dL Final   07/17/2021 12 7 12 0 - 17 0 g/dL Final     Platelets   Date Value Ref Range Status   08/02/2021 195 149 - 390 Thousands/uL Final   08/01/2021 202 149 - 390 Thousands/uL Final   08/01/2021 192 149 - 390 Thousands/uL Final     MCV   Date Value Ref Range Status   08/02/2021 95 82 - 98 fL Final   08/01/2021 93 82 - 98 fL Final   07/17/2021 93 82 - 98 fL Final      Potassium   Date Value Ref Range Status   08/03/2021 3 3 (L) 3 5 - 5 3 mmol/L Final   08/02/2021 4 2 3 5 - 5 3 mmol/L Final   08/01/2021 3 4 (L) 3 5 - 5 3 mmol/L Final     Chloride   Date Value Ref Range Status   08/03/2021 96 (L) 100 - 108 mmol/L Final   08/02/2021 98 (L) 100 - 108 mmol/L Final   08/01/2021 98 (L) 100 - 108 mmol/L Final     CO2   Date Value Ref Range Status   08/03/2021 36 (H) 21 - 32 mmol/L Final   08/02/2021 32 21 - 32 mmol/L Final   08/01/2021 34 (H) 21 - 32 mmol/L Final     BUN   Date Value Ref Range Status   08/03/2021 57 (H) 5 - 25 mg/dL Final   08/02/2021 65 (H) 5 - 25 mg/dL Final   08/01/2021 68 (H) 5 - 25 mg/dL Final     Creatinine   Date Value Ref Range Status   08/03/2021 1 44 (H) 0 60 - 1 30 mg/dL Final     Comment:     Standardized to IDMS reference method   08/02/2021 1 58 (H) 0 60 - 1 30 mg/dL Final     Comment:     Standardized to IDMS reference method   08/01/2021 1 76 (H) 0 60 - 1 30 mg/dL Final     Comment:     Standardized to IDMS reference method     Glucose   Date Value Ref Range Status   08/03/2021 123 65 - 140 mg/dL Final     Comment:     If the patient is fasting, the ADA then defines impaired fasting glucose as > 100 mg/dL and diabetes as > or equal to 123 mg/dL  Specimen collection should occur prior to Sulfasalazine administration due to the potential for falsely depressed results  Specimen collection should occur prior to Sulfapyridine administration due to the potential for falsely elevated results  08/02/2021 182 (H) 65 - 140 mg/dL Final     Comment:     If the patient is fasting, the ADA then defines impaired fasting glucose as > 100 mg/dL and diabetes as > or equal to 123 mg/dL  Specimen collection should occur prior to Sulfasalazine administration due to the potential for falsely depressed results  Specimen collection should occur prior to Sulfapyridine administration due to the potential for falsely elevated results  08/01/2021 147 (H) 65 - 140 mg/dL Final     Comment:     If the patient is fasting, the ADA then defines impaired fasting glucose as > 100 mg/dL and diabetes as > or equal to 123 mg/dL  Specimen collection should occur prior to Sulfasalazine administration due to the potential for falsely depressed results  Specimen collection should occur prior to Sulfapyridine administration due to the potential for falsely elevated results       Calcium   Date Value Ref Range Status   08/03/2021 9 0 8 3 - 10 1 mg/dL Final 08/02/2021 9 1 8 3 - 10 1 mg/dL Final   08/01/2021 8 5 8 3 - 10 1 mg/dL Final     Albumin   Date Value Ref Range Status   08/01/2021 1 8 (L) 3 5 - 5 0 g/dL Final   07/17/2021 2 6 (L) 3 5 - 5 0 g/dL Final   07/16/2021 2 5 (L) 3 5 - 5 0 g/dL Final     Total Bilirubin   Date Value Ref Range Status   08/01/2021 0 33 0 20 - 1 00 mg/dL Final     Comment:     Use of this assay is not recommended for patients undergoing treatment with eltrombopag due to the potential for falsely elevated results  07/17/2021 0 27 0 20 - 1 00 mg/dL Final     Comment:     Use of this assay is not recommended for patients undergoing treatment with eltrombopag due to the potential for falsely elevated results  07/16/2021 0 35 0 20 - 1 00 mg/dL Final     Comment:     Use of this assay is not recommended for patients undergoing treatment with eltrombopag due to the potential for falsely elevated results  Alkaline Phosphatase   Date Value Ref Range Status   08/01/2021 100 46 - 116 U/L Final   07/17/2021 91 46 - 116 U/L Final   07/16/2021 88 46 - 116 U/L Final     AST   Date Value Ref Range Status   08/01/2021 49 (H) 5 - 45 U/L Final     Comment:     Specimen collection should occur prior to Sulfasalazine administration due to the potential for falsely depressed results  07/17/2021 35 5 - 45 U/L Final     Comment:     Specimen collection should occur prior to Sulfasalazine administration due to the potential for falsely depressed results  07/16/2021 20 5 - 45 U/L Final     Comment:     Specimen collection should occur prior to Sulfasalazine administration due to the potential for falsely depressed results  ALT   Date Value Ref Range Status   08/01/2021 151 (H) 12 - 78 U/L Final     Comment:     Specimen collection should occur prior to Sulfasalazine administration due to the potential for falsely depressed results      07/17/2021 76 12 - 78 U/L Final     Comment:     Specimen collection should occur prior to Sulfasalazine administration due to the potential for falsely depressed results  2021 49 12 - 78 U/L Final     Comment:     Specimen collection should occur prior to Sulfasalazine administration due to the potential for falsely depressed results  No results found for: LDH  No results found for: TSH  No results found for: [id number]   No results found for: FREET4      RECENT IMAGING:  Procedure: Echo complete with contrast if indicated    Result Date: 2021  Narrative: [address] [place] [address], [address] [phone] Transthoracic Echocardiogram 2D, M-mode, Doppler, and Color Doppler Study date:  2021 Patient: Foreign Hare MR number: [id number] Account number: [de-identified] : 1935 Age: 82 years Gender: Male Status: Inpatient Location: Bedside Height: 68 in Weight: 177 5 lb BP: 129/ 61 mmHg Indications: Shortness of breath  Diagnoses: R06 02 - Shortness of breath Sonographer:  Tomer Elkins RDCS Primary Physician:  Shashank Whitfield DO Referring Physician:  Laila Lopez DO Group:  [id number] Interpreting Physician:  Usha Ragsdale DO SUMMARY SUMMARY: 1  This is a technically difficult study 2  Left ventricle is normal size and function  Left ventricular ejection fraction is estimated at 58%  3  Grade 1 diastolic dysfunction  4  There are no obvious high-grade regional wall motion abnormalities  Endocardium still mildly limited due to poor acoustic windows despite use of echo contrast 5  Right atrium is mildly dilated 6  Aortic valve is sclerotic with adequate separation 7  Trace mitral regurgitation  Mild mitral annular calcification 8  Trace tricuspid regurgitation pulmonary artery systolic pressure is estimated at 25-30 mm Hg 9  Aortic root is mildly dilated 3 8 cm 10  There is no study for comparison SUMMARY MEASUREMENTS 2D measurements: Unspecified Anatomy:   %FS was 36 3 %  Ao Diam was 3 8 cm    Ao asc was 3 4 cm   EDV(Teich) was 101 2 ml   EF(Teich) was 66 %  ESV(Teich) was 34 4 ml  IVSd was 0 9 cm  LA Area was 17 cm2  LA Diam was 2 9 cm  LVIDd was 4 7 cm  LVIDs was 3 cm  LVPWd was 0 9 cm  RA Area was 20 cm2  RVIDd was 4 5 cm  RWT was 0 4    SV(Teich) was 66 7 ml  CW measurements: Unspecified Anatomy:   TR Vmax was 2 4 m/s  TR maxPG was 23 8 mmHg  MM measurements: Unspecified Anatomy:   TAPSE was 2 4 cm  PW measurements: Unspecified Anatomy:   E' Sept was 0 1 m/s  E/E' Sept was 10 9   MV A Ivan was 1 m/s  MV Dec McLean was 5 m/s2  MV DecT was 135 1 ms   MV E Ivan was 0 7 m/s  MV E/A Ratio was 0 7   MV PHT was 39 2 ms  MVA By PHT was 5 6 cm2  HISTORY: PRIOR HISTORY: Cancer  Hypertension  Hyperlipidemia  Hypothyroid  Asthma  PROCEDURE: The procedure was performed at the bedside  This was a routine study  The transthoracic approach was used  The study included complete 2D imaging, M-mode, complete spectral Doppler, and color Doppler  The heart rate was 97 bpm, at the start of the study  Images were obtained from the parasternal, apical, subcostal, and suprasternal notch acoustic windows  Intravenous contrast ( 0 4 ml Definity in NSS) was administered to opacify the left ventricle  Echocardiographic views were limited due to poor acoustic window availability, decreased penetration, and lung interference  This was a technically difficult study  LEFT VENTRICLE: Left ventricle is normal in size and function  Left ventricular ejection fraction is estimated at 58%  Normal wall thickness  Grade 1 diastolic dysfunction  There are no obvious high-grade regional wall motion abnormalities  RIGHT VENTRICLE: Right ventricle is mildly dilated with normal systolic function  LEFT ATRIUM: Left atrium is normal in size  ATRIAL SEPTUM: The interatrial septum appears to be grossly normal without evidence of shunting by color-flow Doppler  RIGHT ATRIUM: Right atrium is mildly dilated  MITRAL VALVE: Mitral valve opens well   Fibrocalcific changes of the mitral valve  Mild mitral annular calcification  No evidence of mitral stenosis  Trace mitral regurgitation AORTIC VALVE: Aortic valve is sclerotic, but not well visualized  No evidence of aortic stenosis or aortic regurgitation  TRICUSPID VALVE: Tricuspid valve opens well  Trace tricuspid regurgitation  Pulmonary systolic pressures estimated at 25-30 mm Hg PULMONIC VALVE: Pulmonic valve is not well visualized  No evidence of pulmonic stenosis or pulmonic regurgitation  PERICARDIUM: There is no evidence of significant pericardial effusion  AORTA: Aortic root is mildly dilated 3 8 cm  Ascending aorta is normal in size  SYSTEMIC VEINS: IVC: IVC is normal size with greater than 50% respirophasic collapse  SYSTEM MEASUREMENT TABLES 2D %FS: 36 3 % Ao Diam: 3 8 cm Ao asc: 3 4 cm EDV(Teich): 101 2 ml EF(Teich): 66 % ESV(Teich): 34 4 ml IVSd: 0 9 cm LA Area: 17 cm2 LA Diam: 2 9 cm LVIDd: 4 7 cm LVIDs: 3 cm LVPWd: 0 9 cm LVd Mass: 155 5 g LVd Mass Index: 79 7 g/m2 RA Area: 20 cm2 RVIDd: 4 5 cm RWT: 0 4 SV(Teich): 66 7 ml CW TR Vmax: 2 4 m/s TR maxP 8 mmHg MM TAPSE: 2 4 cm PW E' Sept: 0 1 m/s E/E' Sept: 10 9 MV A Ivan: 1 m/s MV Dec Braxton: 5 m/s2 MV DecT: 135 1 ms MV E Ivan: 0 7 m/s MV E/A Ratio: 0 7 MV PHT: 39 2 ms MVA By PHT: 5 6 cm2 [address] Accredited Echocardiography Laboratory Prepared and electronically signed by Valerie Moreno DO Signed 2021 17:20:58     Procedure: CT soft tissue neck with contrast    Result Date: 2021  Narrative: CT NECK WITH CONTRAST INDICATION:   Shortness of breath, hoarseness and lung cancer  COMPARISON:  2019 and 2021  TECHNIQUE:  Axial, sagittal, and coronal 2D reformatted images were created from the axial source data and submitted for interpretation  Radiation dose length product (DLP) for this visit:  778 mGy-cm     This examination, like all CT scans performed in the [facility], was performed utilizing techniques to minimize radiation dose exposure, including the use of iterative reconstruction and automated exposure control  IV Contrast:  100 mL of iohexol (OMNIPAQUE) IMAGE QUALITY:  Diagnostic  FINDINGS: VISUALIZED BRAIN PARENCHYMA:  Normal enhancement  VISUALIZED ORBITS AND PARANASAL SINUSES:  Partially visualized globes and orbits and paranasal sinuses, unremarkable  NASAL CAVITY AND NASOPHARYNX:  No nasopharyngeal inflammation  Patent nasal cavity  SUPRAHYOID NECK:  Intact oral cavity  No parapharyngeal retropharyngeal inflammation  INFRAHYOID NECK:  Epiglottis, aryepiglottic folds and piriform sinuses are normal   Normal glottis and subglottic airway  THYROID GLAND:  Unremarkable  PAROTID AND SUBMANDIBULAR GLANDS:  No sialoadenitis  LYMPH NODES:  No lymphadenopathy in the neck  VASCULAR STRUCTURES:  Calcified plaque at the right ICA origin resulting in moderate (50-69%) stenosis  THORACIC INLET:  Right upper lobe spiculated 5 x 7 x 5 cm mass extending to the hilum and encasing the  upper lobe proximal segmental bronchi  Pulmonary nodules measuring 3 to 4 mm in the apical region of the right upper lobe  High right paratracheal low-attenuation 1 5 cm lesion may represent a necrotic lymph node BONY STRUCTURES: Lytic or blastic lesion  Disc and facet osteophytosis in the cervical spine resulting in mild to moderate central canal stenosis and moderate to severe neural foraminal narrowing  Impression: 1  No evidence of mass or lymphadenopathy in the neck  2   Right upper lobe and hilar mass consistent with primary lung malignancy, further evaluated on the accompanying chest CT report  Workstation performed: [id number]     Procedure: CT chest with contrast    Result Date: 7/14/2021  Narrative: CT CHEST WITH IV CONTRAST INDICATION:   Hoarseness and shortness of breath  COMPARISON:  11/17/2006  TECHNIQUE: CT examination of the chest was performed   Axial, sagittal, and coronal 2D reformatted images were created from the source data and submitted for interpretation  Radiation dose length product (DLP) for this visit:  756 mGy-cm   This examination, like all CT scans performed in the [facility], was performed utilizing techniques to minimize radiation dose exposure, including the use of iterative reconstruction and automated exposure control  IV Contrast:  100 mL of iohexol (OMNIPAQUE) FINDINGS: LUNGS:  Spiculated right upper lobe mass extending to the major minor fissures, right paratracheal region suprahilar region measuring 8 2 x 5 1 x 7 6 cm  Mass extends to the pleura laterally  Satellite pulmonary nodules in the apical region of the right upper lobe measuring 2 to 3 mm and adjacent scarring  Calcified granuloma in the left lower lobe  Encasement of the right mainstem bronchus and proximal upper lobe segmental bronchi  Encasement of the origins of the right middle and lower lobe segmental bronchi  Narrowing of segmental and subsegmental bronchi without occlusion or endobronchial lesion  PLEURA:  No effusion  HEART/GREAT VESSELS:  Normal heart size  No thoracic aortic aneurysm  MEDIASTINUM AND GAEL:  Low-attenuation high right paratracheal 1 6 cm lymph node  Right upper lobe mass extends into the right hilum and aortopulmonary window, possibly representing confluent lymphadenopathy  Subcarinal 2 cm lymph node  CHEST WALL AND LOWER NECK:   Unremarkable  VISUALIZED STRUCTURES IN THE UPPER ABDOMEN:  Unremarkable  OSSEOUS STRUCTURES:  No acute fracture or destructive osseous lesion  Impression: 1  Right upper lobe, hilar and paratracheal mass measuring 8 2 x 5 1 x 7 6 cm consistent with primary lung malignancy  Encasement of the right mainstem bronchus and proximal segmental bronchi without evidence of endobronchial mass or occlusion  2   Satellite 2 to 3 mm pulmonary nodules in the apical region of the right upper lobe  3   High right paratracheal 1 6 cm lymph node, possibly necrotic  Subcarinal 2 cm lymph node  Workstation performed: [id number]     Procedure: FL barium swallow video w speech    Result Date: 7/16/2021  Narrative: A video barium swallow study was performed by the Department of Speech Pathology  Please refer to the report for the official interpretation  The images are stored for archival purposes only  Study images were not formally reviewed by the Radiology Department  Assessment/Plan  Mr Lili Steen is a 82 yr male with 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 status post 2 treatments of pembrolizumab and radiation to the chest and spine here with respiratory distress concerning for multifactorial etiology including heart failure, his disease, and possible side effects from pembrolizumab  He and his wife both state that he gets short of breath following treatment with pembrolizumab, but he did receive his 2nd treatment on Saturday July 31st     We discussed that his presentation is a max of different elements including mild heart failure, pembrolizumab treatment, and his disease as well, given that it is primarily in his lungs  He will follow-up for gamma knife on Friday August 6, 2021 if he is discharged from the hospital   But they would like to continue with her oncology care here at [address] [place] [address]  I will facilitate an appointment with her outpatient doctors for his lung cancer treatment  I provided them with the phone number for [facility] and and I will send an e-mail to [facility] line asking for follow-up appointment in [facility] within 1 week of his discharge  I also discussed the subject of a potential palliative care consult for management of side effects  They seemed to be open to this idea  We will continue to follow along but please call with any additional questions or concerns      Roddy Barrow MD, PhD

## 2021-08-03 NOTE — ASSESSMENT & PLAN NOTE
· Continue current pain regimen, patient does not report much pain  · Patient's family asked about a palliative care consult, likely not appropriate at this time, as patient continues to want cancer treatment and has relief with his current pain management  · Recommend having a discussion with patient and patient's family about goals of care, as daughter expresses concerns about quality of life related to the cancer and the cancer treatment, and patient wishes to continue treatment at this time   Patient today had some changes to medication regimen from cardiology and new discovery of a DVT, more appropriate to discuss at a later day

## 2021-08-03 NOTE — ASSESSMENT & PLAN NOTE
· Wound sacral ulcer present on admission  · Wound care consulted, saw pt yesterday and will follow throughout admission  · Plan to follow-up outpatient for continued wound care  · Patient also has sutures on forehead, reports there was a skin mass that his plastic surgeon removed, was scheduled to have sutures removed this Friday 8/6  · Patient's family requested plastic surgery to look at sutures, plastic surgery consulted

## 2021-08-03 NOTE — ASSESSMENT & PLAN NOTE
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 records  · Received 2nd dose was this past weekend  · Patient's daughter wishes to transfer care to [place] [id number] oncology  · Oncology consulted and saw pt today, spoke with oncology and they noted that patient and patient's family at this time need reassurance and guidance on the goals of care given all of his active medical problems occurring along with his cancer, will discuss further when patient gets discharged  · Oncology will schedule outpatient follow-up

## 2021-08-04 PROBLEM — R06.02 SHORTNESS OF BREATH: Status: ACTIVE | Noted: 2021-08-01

## 2021-08-04 LAB
ANION GAP SERPL CALCULATED.3IONS-SCNC: 8 MMOL/L (ref 4–13)
BUN SERPL-MCNC: 54 MG/DL (ref 5–25)
CALCIUM SERPL-MCNC: 9.2 MG/DL (ref 8.3–10.1)
CHLORIDE SERPL-SCNC: 98 MMOL/L (ref 100–108)
CO2 SERPL-SCNC: 32 MMOL/L (ref 21–32)
CREAT SERPL-MCNC: 1.29 MG/DL (ref 0.6–1.3)
GFR SERPL CREATININE-BSD FRML MDRD: 51 ML/MIN/1.73SQ M
GLUCOSE SERPL-MCNC: 137 MG/DL (ref 65–140)
POTASSIUM SERPL-SCNC: 4.4 MMOL/L (ref 3.5–5.3)
SODIUM SERPL-SCNC: 138 MMOL/L (ref 136–145)

## 2021-08-04 PROCEDURE — 97535 SELF CARE MNGMENT TRAINING: CPT

## 2021-08-04 PROCEDURE — 99232 SBSQ HOSP IP/OBS MODERATE 35: CPT | Performed by: INTERNAL MEDICINE

## 2021-08-04 PROCEDURE — 97129 THER IVNTJ 1ST 15 MIN: CPT

## 2021-08-04 PROCEDURE — 80048 BASIC METABOLIC PNL TOTAL CA: CPT | Performed by: INTERNAL MEDICINE

## 2021-08-04 RX ADMIN — APIXABAN 10 MG: 5 TABLET, FILM COATED ORAL at 09:43

## 2021-08-04 RX ADMIN — APIXABAN 10 MG: 5 TABLET, FILM COATED ORAL at 17:26

## 2021-08-04 RX ADMIN — SALMETEROL XINAFOATE 1 PUFF: 50 POWDER, METERED ORAL; RESPIRATORY (INHALATION) at 17:26

## 2021-08-04 RX ADMIN — POLYETHYLENE GLYCOL 3350 17 G: 17 POWDER, FOR SOLUTION ORAL at 09:44

## 2021-08-04 RX ADMIN — PRAVASTATIN SODIUM 20 MG: 10 TABLET ORAL at 17:26

## 2021-08-04 RX ADMIN — PRIMIDONE 50 MG: 50 TABLET ORAL at 09:43

## 2021-08-04 RX ADMIN — PRIMIDONE 50 MG: 50 TABLET ORAL at 20:53

## 2021-08-04 RX ADMIN — TIOTROPIUM BROMIDE 18 MCG: 18 CAPSULE ORAL; RESPIRATORY (INHALATION) at 09:43

## 2021-08-04 RX ADMIN — ASPIRIN 81 MG CHEWABLE TABLET 81 MG: 81 TABLET CHEWABLE at 09:44

## 2021-08-04 RX ADMIN — SALMETEROL XINAFOATE 1 PUFF: 50 POWDER, METERED ORAL; RESPIRATORY (INHALATION) at 09:43

## 2021-08-04 RX ADMIN — DOCUSATE SODIUM AND SENNOSIDES 1 TABLET: 8.6; 5 TABLET, FILM COATED ORAL at 17:26

## 2021-08-04 RX ADMIN — TORSEMIDE 10 MG: 20 TABLET ORAL at 09:43

## 2021-08-04 RX ADMIN — MORPHINE SULFATE 30 MG: 15 TABLET, EXTENDED RELEASE ORAL at 20:53

## 2021-08-04 RX ADMIN — AMILORIDE HYDROCLORIDE 5 MG: 5 TABLET ORAL at 09:43

## 2021-08-04 RX ADMIN — LEVOTHYROXINE SODIUM 75 MCG: 75 TABLET ORAL at 05:30

## 2021-08-04 RX ADMIN — MORPHINE SULFATE 30 MG: 15 TABLET, EXTENDED RELEASE ORAL at 09:43

## 2021-08-04 RX ADMIN — CARVEDILOL 3.12 MG: 3.12 TABLET, FILM COATED ORAL at 17:26

## 2021-08-04 RX ADMIN — CARVEDILOL 3.12 MG: 3.12 TABLET, FILM COATED ORAL at 07:40

## 2021-08-04 NOTE — OCCUPATIONAL THERAPY NOTE
OccupationalTherapy Progress Note(onmf=[id number])     Patient Name: Alpa FULTON Date: 8/4/2021  Problem List  Principal Problem:    Acute on chronic diastolic (congestive) heart failure ([facility] )  Active Problems:    Hypertension    Tremor, essential    Malignant neoplasm of upper lobe of right lung ([facility] )    Cancer related pain    Acute kidney injury ([facility] )    Severe protein-calorie malnutrition ([facility] )    Acute respiratory failure with hypoxia (HCC)    Pressure injury of skin of sacral region    Deep vein thrombosis (DVT) of popliteal vein of right lower extremity ([facility] )              08/04/21 0906   Note Type   Note Type Treatment   Restrictions/Precautions   Weight Bearing Precautions Per Order No   Other Precautions Cognitive; Chair Alarm; Fall Risk   Pain Assessment   Pain Assessment Tool FLACC   Pain Rating: FLACC (Rest) - Face 0   Pain Rating: FLACC (Rest) - Legs 0   Pain Rating: FLACC (Rest) - Activity 0   Pain Rating: FLACC (Rest) - Cry 1   Pain Rating: FLACC (Rest) - Consolability 0   Score: FLACC (Rest) 1   ADL   Where Assessed Chair   Grooming Assistance 5  Supervision/Setup   Grooming Deficit Setup;Brushing hair   Toileting Assistance  5  Supervision/Setup   Toileting Deficit Clothing management up;Clothing management down;Setup   Functional Standing Tolerance   Time 2-3mins   Activity grooming    Transfers   Sit to Stand 5  Supervision   Additional items Increased time required;Verbal cues   Stand to Sit 5  Supervision   Additional items Increased time required;Verbal cues   Functional Mobility   Functional Mobility 5  Supervision   Additional items Rolling walker   Cognition   Overall Cognitive Status Impaired   Arousal/Participation Alert   Attention Attends with cues to redirect   Orientation Level Oriented X4   Memory Decreased recall of precautions   Following Commands Follows one step commands with increased time or repetition   Activity Tolerance   Activity Tolerance Patient limited by fatigue Medical Staff Made Aware nsg    Assessment   Assessment Pt seen for 26 min tx session with focus on functional balance, functional mobility, ADL status, transfer safety, and cognition  Pt able to tolerate OOB mobility; sitting balance=g/g-, standing balance=f/f+  Pt able to demonstrate improvement with his functional mobility but continues to require verbal cues to maintain proper walker safety  Cognitve deficits noted--i  e problem-solving, direction-following(attempted ACLS but unable to complete 2* visit from his ; noted difficulty with running stitch)  Pt would best benefit from a supervised environment 2* cognitive/home-safety concerns  Tx tolerated well  Will continue  Plan   Treatment Interventions ADL retraining;Functional transfer training; Endurance training;Cognitive reorientation;Patient/family training; Compensatory technique education   Goal Expiration Date 08/16/21   OT Treatment Day 1   OT Frequency 3-5x/wk   Recommendation   OT Discharge Recommendation   (continue PT/OT)   AM-PAC Daily Activity Inpatient   Lower Body Dressing 3   Bathing 3   Toileting 3   Upper Body Dressing 3   Grooming 4   Eating 4   Daily Activity Raw Score 20   Daily Activity Standardized Score (Calc for Raw Score >=11) 42 03   AM-PAC Applied Cognition Inpatient   Following a Speech/Presentation 4   Understanding Ordinary Conversation 4   Taking Medications 2   Remembering Where Things Are Placed or Put Away 2   Remembering List of 4-5 Errands 2   Taking Care of Complicated Tasks 2   Applied Cognition Raw Score 16   Applied Cognition Standardized Score 35 03   Usha Ronquillo, OT

## 2021-08-04 NOTE — PROGRESS NOTES
Progress Note - Cardiology   Rosa Wilder 82 y o  male MRN: [id number]  Unit/Bed#: E2 -01 Encounter: [id number]    Assessment:   1  Acute hypoxic respiratory failure, resolved, on room air  2  Chronic diastolic heart failure, compensated  3  Hypoalbuminemia, 1 8   4  Non-small cell lung cancer stage IV with brain and bone metastasis   5  No edema right leg, trace to 1+ edema left leg  6  Bilateral nonobstructive carotid artery disease  7  Hypothyroidism   8  Hypokalemia and alkalosis resolved  9  Acute on chronic renal insufficiency, significantly improving  Plan:    1  Patient may be discharged today or tomorrow  Patient prefers tomorrow  2  Would discharge on Demadex 10 mg daily   3  Consider discharging on KCl 10 mEq daily  4  Encourage patient to follow sodium restricted diet, high in protein  5  Will sign off, call if further cardiac assistance needed      Interval history:  Repeat chest x-ray done in department demonstrates no sign of heart failure  X-ray was reviewed in  X-ray department  Potassium 3 3 -> 4 4,  GFR44 ->  51,  Creatinine  1 44 > 1 29   O2 sat 92-96% on room air    PROBLEM LIST:    Patient Active Problem List    Diagnosis Date Noted    Deep vein thrombosis (DVT) of popliteal vein of right lower extremity ([address] ) 08/03/2021    Acute on chronic diastolic (congestive) heart failure ([address] ) 08/01/2021    Shortness of breath 08/01/2021    Pressure injury of skin of sacral region 08/01/2021    Severe protein-calorie malnutrition ([address] ) 07/15/2021    Other dysphagia 07/14/2021    Acute respiratory insufficiency 07/14/2021    Malignant neoplasm of upper lobe of right lung ([address] ) 07/14/2021    Cancer related pain 07/14/2021    Hypokalemia 07/14/2021    Acute kidney injury ([address] ) 07/14/2021    Neoplasm of unspecified behavior of bone, soft tissue, 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/2020    Dystonia 02/27/2020    Tremor, essential 07/31/2019    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    Asthma 04/25/2019    Hypertension 04/25/2019    Lateral epicondylitis 04/25/2019    Localized primary osteoarthritis of wrist 04/25/2019    Lumbosacral spondylosis without myelopathy 04/25/2019    Primary osteoarthritis of left shoulder 04/25/2019    Carpal tunnel syndrome 10/05/2017    Cubital tunnel syndrome 10/05/2017    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    Neuropathy of both feet 11/20/2015    Benign essential hypertension 06/22/2010    ED (erectile dysfunction) of organic origin 06/22/2010    Lumbar spondylosis with myelopathy 07/28/2008    Spinal stenosis of lumbar region 04/23/2008    Low back pain 04/03/2008       Vitals: /96 (BP Location: Right arm)   Pulse 85   Temp 98 1 °F (36 7 °C) (Temporal)   Resp 18   Wt 77 5 kg (170 lb 13 7 oz)   SpO2 93%   BMI 25 98 kg/m²   PREVIOUS WEIGHTS:   Wt Readings from Last 5 Encounters:   08/04/21 77 5 kg (170 lb 13 7 oz)   07/16/21 80 2 kg (176 lb 12 9 oz)   07/02/21 84 6 kg (186 lb 6 4 oz)   02/23/21 87 5 kg (193 lb)   02/10/21 91 4 kg (201 lb 9 6 oz)        Labs/Data:        Results from last 7 days   Lab Units 08/02/21  0619 08/01/21  1659 08/01/21  1253   WBC Thousand/uL 7 14  --  8 60   HEMOGLOBIN g/dL 11 5*  --  10 7*   HEMATOCRIT % 35 0*  --  32 1*   MCV fL 95  --  93   MCH pg 31 1  --  30 9   MCHC g/dL 32 9  --  33 3   PLATELETS Thousands/uL 195 202 192     Results from last 7 days   Lab Units 08/04/21  0530 08/03/21  0431 08/02/21  0619   EGFR ml/min/1 73sq m 51 44 40   SODIUM mmol/L 138 138 138   POTASSIUM mmol/L 4 4 3 3* 4 2   CHLORIDE mmol/L 98* 96* 98*   CO2 mmol/L 32 36* 32   BUN mg/dL 54* 57* 65*   CREATININE mg/dL 1 29 1 44* 1 58*     Results from last 7 days   Lab Units 08/04/21  0530 08/03/21  0431 08/02/21  0619 08/01/21  1253   CALCIUM mg/dL 9 2 9 0 9 1 8 5   AST U/L  --   --   --  49*   ALT U/L  --   --   --  151*   ALK PHOS U/L  --   --   --  100   MAGNESIUM mg/dL  --   --  2 4  --      Results from last 7 days   Lab Units 08/01/21  1253   TROPONIN I ng/mL <0 02     Lab Results   Component Value Date    NTBNP 1,618 (H) 08/01/2021    NTBNP 1,605 (H) 07/14/2021     Lab Results   Component Value Date    HGBA1C 6 8 (H) 07/23/2021     No results found for: TSH  No results found for: DIGOXIN  Results from last 7 days   Lab Units 08/01/21  1253   INR  1 47*   PROTIME seconds 17 5*          Current Facility-Administered Medications:     albuterol (PROVENTIL HFA,VENTOLIN HFA) inhaler 2 puff, 2 puff, Inhalation, Q4H PRN, Luana Craig MD    AMILoride tablet 5 mg, 5 mg, Oral, Daily, Dacia Dean MD, 5 mg at 08/04/21 0878    apixaban (ELIQUIS) tablet 10 mg, 10 mg, Oral, BID, Joanna Smith PA-C, 10 mg at 08/04/21 6264    aspirin chewable tablet 81 mg, 81 mg, Oral, Daily, Luana Craig MD, 81 mg at 08/04/21 0944    carvedilol (COREG) tablet 3 125 mg, 3 125 mg, Oral, BID With Meals, Dacia Dean MD, 3 125 mg at 08/04/21 0740    levothyroxine tablet 75 mcg, 75 mcg, Oral, Early Morning, Luana Craig MD, 75 mcg at 08/04/21 0530    morphine (MS CONTIN) ER tablet 30 mg, 30 mg, Oral, Q12H [address], Luana Craig MD, 30 mg at 08/04/21 0943    oxyCODONE (ROXICODONE) immediate release tablet 10 mg, 10 mg, Oral, Q4H PRN, Luana Craig MD    polyethylene glycol (MIRALAX) packet 17 g, 17 g, Oral, Daily, Luana Craig MD, 17 g at 08/04/21 0944    pravastatin (PRAVACHOL) tablet 20 mg, 20 mg, Oral, Daily With Nishi Moreland MD, 20 mg at 08/03/21 1621    primidone (MYSOLINE) tablet 50 mg, 50 mg, Oral, Q12H [address], Luana Craig MD, 50 mg at 08/04/21 0943    prochlorperazine (COMPAZINE) tablet 10 mg, 10 mg, Oral, Q6H PRN, Luana Craig MD    tiotropium [facility]) capsule for inhaler 18 mcg, 18 mcg, Inhalation, Daily, 18 mcg at 08/04/21 0943 **AND** salmeterol (Serevent Diskus) 50 mcg/dose inhaler 1 puff, 1 puff, Inhalation, BID, Luana Craig MD, 1 puff at 08/04/21 0943    senna-docusate sodium (SENOKOT S) 8 6-50 mg per tablet 1 tablet, 1 tablet, Oral, Before Jerrica Hays MD, 1 tablet at 08/03/21 1621    torsemide [facility]) tablet 10 mg, 10 mg, Oral, Daily, Polina Vidal MD, 10 mg at 08/04/21 5642  Allergies   Allergen Reactions    Lisinopril      Swelling of tongue    Sulfa Antibiotics Other (See Comments)     pancreatitis    Other reaction(s): Other (Please comment)  Pancreas destroy itself    Zestoretic [Lisinopril-Hydrochlorothiazide]      Pancreatitis         Intake/Output Summary (Last 24 hours) at 8/4/2021 1120  Last data filed at 8/4/2021 0700  Gross per 24 hour   Intake 120 ml   Output --   Net 120 ml       Invasive Devices     Peripheral Intravenous Line            Peripheral IV 08/01/21 Right Forearm 2 days                Review of Systems   Constitutional: Negative for activity change  Respiratory: Negative for cough, chest tightness, shortness of breath and wheezing  Cardiovascular: Negative for chest pain, palpitations and leg swelling  Musculoskeletal: Negative for gait problem  Skin: Negative for color change  Neurological: Negative for dizziness, tremors, syncope, weakness, light-headedness and headaches  Psychiatric/Behavioral: Negative for agitation and confusion  Physical Exam  Vitals reviewed  Constitutional:       General: He is not in acute distress  Appearance: He is well-developed  HENT:      Head: Normocephalic and atraumatic  Neck:      Thyroid: No thyromegaly  Vascular: No carotid bruit or JVD  Trachea: No tracheal deviation  Cardiovascular:      Rate and Rhythm: Normal rate and regular rhythm  Pulses: Normal pulses  Heart sounds: Normal heart sounds  No murmur heard  No friction rub  No gallop  Pulmonary:      Effort: Pulmonary effort is normal  No respiratory distress  Breath sounds: Normal breath sounds  No wheezing, rhonchi or rales  Chest:      Chest wall: No tenderness  Abdominal:      General: Bowel sounds are normal  There is no distension  Palpations: Abdomen is soft  Tenderness: There is no abdominal tenderness  Musculoskeletal:      Cervical back: Normal range of motion and neck supple  Right lower leg: No edema  Left lower leg: Edema present  Comments: No right leg edema, 1+ left ankle edema  Skin:     General: Skin is warm and dry  Neurological:      General: No focal deficit present  Mental Status: He is alert and oriented to person, place, and time  Gait: Gait normal    Psychiatric:         Mood and Affect: Mood normal          Behavior: Behavior normal          Thought Content: Thought content normal          Judgment: Judgment normal        ======================================================     Imaging:   I have personally reviewed pertinent reports

## 2021-08-04 NOTE — CONSULTS
Consultation - Palliative & Supportive Care   Lavon Rm  82 y o   male  Mena 2 /E2 MS 56-*   MRN: [id number]  Encounter: [id number]    ASSESSMENT:    Patient Active Problem List   Diagnosis    Asthma    Benign essential hypertension    Carpal tunnel syndrome    Cubital tunnel syndrome    ED (erectile dysfunction) of organic origin    Hypertension    Lateral epicondylitis    Localized primary osteoarthritis of wrist    Low back pain    Lumbar spondylosis with myelopathy    Lumbosacral spondylosis without myelopathy    Neuropathy of both feet    Primary osteoarthritis of left shoulder    Spinal stenosis of lumbar region    Status post total replacement of left shoulder    Tremor, essential    Dystonic tremor    Dystonia    Neoplasm of unspecified behavior of bone, soft tissue, and skin    Other dysphagia    Acute respiratory insufficiency    Malignant neoplasm of upper lobe of right lung (HCC)    Cancer related pain    Hypokalemia    Acute kidney injury ([address] )    Severe protein-calorie malnutrition (HCC)    Acute on chronic diastolic (congestive) heart failure (HCC)    Shortness of breath    Pressure injury of skin of sacral region    Deep vein thrombosis (DVT) of popliteal vein of right lower extremity (HCC)       Active problems addressed:  · Stage IVB/Metastatic SCC of the lung  · Bone metastases (T12, R hemisacrum, L acetabulum)   · Brain metastasis (L posterior parietal)  · cHFpEF  · Weakness  · Fatigue  · Pressure ulcer  · VTE  · Cancer related pain  · Goals of care    Consult is for daughter requesting palliative care evaluation, discussion of hospice     PALLIATIVE AND SUPPORTIVE CARE FAMILY CONFERENCE:    Time of Meetinpm    Participants: patient/Dorian, patient's wife/Suzanne, patient's son/POORNIMA Tabares team/Dr Tamie Lara, Ms Bishop SAENZ, RN, myself/Palliative MD    Patient Participation: present and full    Patient Support System: above    Meeting Location: inside the patient's room so he can participate    Advanced Directive of POLST available: no    A family meeting was held for goals setting  This meeting was necessary for determine the appropriate course of treatment per the daughter's request  However, patient and his wife requested that their daughter not take part in this meeting and to stay outside of the room with the door closed  Topics of Discussion:  1  Wife asked many questions surrounding cancer treatments and logistics of transferring cancer cares to [facility]  They all expressed desire to hear more treatment options  Also interested in pursuing gamma knife that may be best pursued with previous RadOnc with [facility] as already established with them  2  Discussed possible side effects of further immunotherapy  3  Discussed hospice as an option should they exhaust all other treatment options, unable to tolerate treatments, or decide to forego any treatments  Other Content of Meeting:  Time spent providing emotional support  Wife and patient expressed their struggles with their daughter  PLAN:  1  Not ready for hospice  2  Wants to follow up with [facility] and interested in further treatments  3  Wants palliative services that is able to do home visits  SLIM will refer to [address]  and hospice who will continue supportive cares and symptom management as he goes through cancer treatments  4  Cancer pain controlled  Continue home regimen  Please provide refills if appropriate on dispo  5  CM for dispo planning  6  SL Palliative care will sign off  Pls refer to [facility] [place] on dispo  Time Involved in Meetinmins, beginning at approximately 1pm and ending at approximately 2pm      Code status: Level 1 - Full Code   Decisional apparatus:  Patient does have capacity to make medical decisions on my exam today  If such capacity is lost, patient's substitute decision maker would default to wife/Suzanne by [place]     Advance Directive / Living Will / POLST:  None    We appreciate the opportunity to participate in this patient's care  We will continue to follow  Please do not hesitate to contact our on-call provider through our clinic answering service at [phone] should you have acute symptom control concerns  IDENTIFICATION:  Inpatient consult to Palliative Care  Consult performed by: Odilon Smith MD  Consult ordered by: Yecenia Whitehead DO        Reason for Consult / Principal Problem: daughter requesting palliative care evaluation, discussion of hospice     HISTORY OF PRESENT ILLNESS:    Lavon Rm is a 82 y o  male with metastatic SCC of the lung (Dx in 6/2021, stage IVB at time of diagnosis) with mets to the bone (T12, R hemisacrum, L acetabulum) and brain (L posterior parietal) who was getting treatments previously with [facility] oncology  He was recommended to begin keytruda+abraxane+carboplatin and gamma knife after RT  He has since finished RT to painful met at T12, RUL lung and brain  He was re-assessed by oncology after he finished RT  At that visit, oncology noted poor(er) ECOG status and so only [place] was started (7/9), noted "addition of chemo likely would cause more harm than good"  He was then hospitalized 7/14 at [facility] due to SOB thought due to underlying cancer, seen by cardio and improved with increased lasix  He received his 2nd [place] on 7/31  He again is admitted due to SOB thought from acute HFpEF  Family (daughter and son) expressed concern for patient's overall QOL as his health continues to decline, especially with continued cancer treatments  [facility] for goal setting per daughter's request     Spoke to daughter yesterday who expressed concerns about her father's decline especially with treatments  She worries about him and feels that hospice may be the best option for him  She was the one who requested a palliative consult to help discuss hospice with the family       A family meeting was scheduled for today with the daughter present  However, a few minutes prior to the meeting, the patient and his wife requested that the daughter not attend the meeting  Rest of conversation as above  Interview and exam limited by: none    Review of Systems   Constitutional: Positive for fatigue  Negative for activity change and appetite change  HENT: Negative for trouble swallowing  Respiratory: Negative for shortness of breath  Cardiovascular: Negative for chest pain  Gastrointestinal: Positive for abdominal pain  Neurological: Positive for weakness  Psychiatric/Behavioral: Negative for sleep disturbance  The patient is not nervous/anxious  All other systems reviewed and are negative        Past Medical History:   Diagnosis Date    Arthritis     Asthma     Chronic pain disorder     back    Colon polyp     Disease of thyroid gland     hypo    Dystonic tremor     Essential tremor     Hyperlipidemia     Hypertension     Pneumonia     x2    Stroke ([facility] [address] )     unknown and no residual     Past Surgical History:   Procedure Laterality Date    BACK SURGERY      CATARACT EXTRACTION Left     COLONOSCOPY      JOINT REPLACEMENT Left     reverse shoulder    MOHS RECONSTRUCTION N/A 6/3/2020    Procedure: REPAIR MOHS OF THE NOSE;  Surgeon: Adriana Washington MD;  Location: [address] [place] [address];  Service: Plastics    [place] DELAY/SECTN FLAP LID,NOS,EAR,LIP N/A 7/15/2020    Procedure: DIVIDE NASAL FLAP;  Surgeon: Adriana Washington MD;  Location: [address] [place] [address];  Service: Plastics    ROTATOR CUFF REPAIR      SPINE SURGERY      TONSILLECTOMY       Social History     Socioeconomic History    Marital status: Unknown     Spouse name: Not on file    Number of children: Not on file    Years of education: Not on file    Highest education level: Not on file   Occupational History    Not on file   Tobacco Use    Smoking status: Former Smoker     Types: Cigarettes     Quit date: 1994     Years since quittin 2    Smokeless tobacco: Current User     Types: Chew    Tobacco comment: currently uses chewing tobacco   Vaping Use    Vaping Use: Never used   Substance and Sexual Activity    Alcohol use: Yes     Comment: < 1 drink a month    Drug use: Never    Sexual activity: Not Currently   Other Topics Concern    Not on file   Social History Narrative    DOES NOT CONSUME CAFFEINE     Social Determinants of Health     Financial Resource Strain:     Difficulty of Paying Living Expenses:    Food Insecurity:     Worried About Running Out of Food in the Last Year:     Ran Out of Food in the Last Year:    Transportation Needs:     Lack of Transportation (Medical):  Lack of Transportation (Non-Medical):    Physical Activity:     Days of Exercise per Week:     Minutes of Exercise per Session:    Stress:     Feeling of Stress :    Social Connections:     Frequency of Communication with Friends and Family:     Frequency of Social Gatherings with Friends and Family:     Attends Adventism Services:     Active Member of Clubs or Organizations:     Attends Club or Organization Meetings:     Marital Status:    Intimate Partner Violence:     Fear of Current or Ex-Partner:     Emotionally Abused:     Physically Abused:     Sexually Abused:      Family History   Problem Relation Age of Onset    No Known Problems Mother     No Known Problems Father        MEDICATIONS / ALLERGIES:  all current active meds have been reviewed    Allergies   Allergen Reactions    Lisinopril      Swelling of tongue    Sulfa Antibiotics Other (See Comments)     pancreatitis    Other reaction(s):  Other (Please comment)  Pancreas destroy itself    Zestoretic [Lisinopril-Hydrochlorothiazide]      Pancreatitis       OBJECTIVE:  /56 (BP Location: Left arm)   Pulse 92   Temp 97 9 °F (36 6 °C) (Temporal)   Resp 18   Wt 77 5 kg (170 lb 13 7 oz)   SpO2 92%   BMI 25 98 kg/m²   Physical Exam:  Constitutional: Appears well-developed and well-nourished  Appears as stated age, appears tired, appears ill, not toxic looking, well-kempt, pleasant, jovial  In no acute physical or emotional distress  Head: Normocephalic and atraumatic  Eyes: EOM are normal  No ocular discharge  No scleral icterus  Neck: No visible adenopathy or masses  Respiratory: Effort normal  No stridor  No respiratory distress  Gastrointestinal: No abdominal distension  Musculoskeletal: No edema  Neurological: Alert, oriented and appropriately conversant  Hard of hearing  Skin: Dry, no diaphoresis  Pale  Psychiatric: Displays a normal mood and affect  Behavior, judgment and thought content appear normal      Lab Results: I have personally reviewed pertinent labs  Imaging Studies: I have personally reviewed pertinent reports  EKG, Pathology, and Other Studies: I have personally reviewed pertinent reports  Counseling / Coordination of Care:  Counseling / Coordination of Care  Total floor / unit time spent today 60+ minutes  Greater than 50% of total time was spent with the patient and / or family counseling and / or coordination of care  A description of the counseling / coordination of care: provided medical updates, discussed palliative care, discussed hospice care, determined competency, determined goals of care, determined POA, determined social/family support, discussed plans of care, discussed symptom management, provided psychosocial support       Davis Garcia MD  [address] and Supportive Care

## 2021-08-04 NOTE — ASSESSMENT & PLAN NOTE
· Wound sacral ulcer present on admission  · Wound care following, patient well follow-up outpatient for continued wound care  · Patient also has sutures on forehead, reports there was a skin mass that his plastic surgeon removed, was scheduled to have sutures removed this Friday 8/6  · Patient's family requested plastic surgery to look at sutures, plastic surgery consulted

## 2021-08-04 NOTE — ASSESSMENT & PLAN NOTE
· Continue current pain regimen  · Plan for family meeting tomorrow to discuss pain management upon discharge

## 2021-08-04 NOTE — QUICK NOTE
Spoke to Dr Matteo Hedrick via TT and then placed a call to patient's daughter/Lisbeth who requested a palliative consult  Briefly, patient is diagnosed with stage 4 SCC of the lung in May 2021  Started [place] and RT to the chest and spine  Received 2 doses so far but with apparent intolerance each time, requiring hospitalization for supportive cares  He has struggled with appetite, nausea, vomiting, weight loss, fatigue  He has the beginning of a sacral ulcer  Children are concerned about continued decline with or without cancer treatments and questions if further treatments are still what is best for patient  They have had a conversation with previous oncologist from [facility] who also suggested comfort cares  Wife did not like that suggestion and so is transferring cancer cares to [facility]  The children have started to discuss hospice with patient's wife last night but was met with resistance  While they are voicing their opinions, they also still want to honor the patient's wishes  I briefly spoke to Samir about hospice in order to  determine if home hospice is feasible  We discussed different level of hospice cares  He may only be eligible for home hospice at this time and she is confident that wife will be able to provide most cares at home  The children will be available to provide additional cares and support as well  The challenge will be getting the patient and/or patient's wife to accept hospice cares  For this reason, I requested that both children, SLIM and/or oncology to be present for a family meeting tomorrow, 8/5 at 1pm at bedside to better discuss options  Formal consult note to follow      D/w Dr Ekta Cunningham MD  Palliative Medicine & Supportive Care  Internal Medicine  Available via [facility] Text  Office: [phone]  Fax: [phone]

## 2021-08-04 NOTE — CONSULTS
The patient with 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 was admitted on August 1st because of shortness of breath and weakness  He is undergoing diuresis but still feels weak and fatigued  I have been following him for skin cancers in removed and lesion for about 11 days ago from his left forehead  That proved to be a squamous cell carcinoma insight to with margins positive  Considering his other medical issues this probably just be watched for now as it should not cause any further concern  Sutures were removed from the wound  A Steri-Strip was placed  He also has a very superficial sacral pressure sore from spending most of his time lying on his back  It is about 1 x 3 cm in length and is clean and granulating  There is no need to do bleeding  However the peripheral VD not lie supine on this for kept in the lateral position he be better off for making this worse  Surgical intervention course would not be and indicated this time  The patient will return to my office for follow-up to treat the sacral pressure sore primarily on as needed basis as I explained to the wife will have to make arrangements to have him brought brought their which may be too difficult  Impression:    1  Squamous cell carcinoma in site to left forehead, he has long history of skin cancers and will need continued surveillance as needed  2   Sacral pressure sore that is superficial   Best treated at this time by keeping pressure off its so minimizing the time in  A supine position is advocated for probably will be difficult  Soft dressings and any other way to keep pressure off this would be recommended  Surgical intervention would not be indicated at this time

## 2021-08-04 NOTE — ASSESSMENT & PLAN NOTE
· 8/2 doppler US B/L LEs: there is evidence of non-occlusive deep vein thrombosis noted in 1 of 2 popliteal veins  , likely chronic  · 8/3 V/Q scan: showed low probability for pulmonary thromboembolism  · Discussed with patient the likelihood of having a pulmonary embolism with a known DVT, reassured patient that while he is on Eliquis it will help decrease risk of new clots forming, and also reinforced that the V/Q scan showed a low probability of a PE present  · Continue loading dose of Eliquis at 10 mg BID for remaining 6 days, then patient will be taking 5 mg BID for maintenance   Pt will need to continue blood thinners upon discharge  · Patient's Cr continues to improve and is close to baseline, benefit of VTE prophylaxis outweighs risk of kidney injury  · Continue monitoring patient for SOB, worsening respiratory status, decreased SpO2

## 2021-08-04 NOTE — ASSESSMENT & PLAN NOTE
· Patient denies significant SOB/difficulty breathing today, notes that it has been much better since he 1st arrived  · O2 sats have been stable at 92% or greater on room air, no episodes of hypoxia for the past couple of days  · DVT found in right LE 8/2, non-occluding  · V/Q scan 8/3 showed low probability of PE  · Patient likely will not need oxygen for home, sats well on room air at rest, recommend checking O2 sats with ambulation prior to discharge

## 2021-08-04 NOTE — ASSESSMENT & PLAN NOTE
· Baseline Cr around 1 2, presented on admission at 1 76  · Of note, during last discharge on 07/17/2021 creatinine was 1 7  · Creatinine close to baseline at 1 29, torsemide 10 mg given this a m   · Much improved, continue to monitor renal function daily

## 2021-08-04 NOTE — ASSESSMENT & PLAN NOTE
Wt Readings from Last 3 Encounters:   08/04/21 77 5 kg (170 lb 13 7 oz)   07/16/21 80 2 kg (176 lb 12 9 oz)   07/02/21 84 6 kg (186 lb 6 4 oz)     · 68-year-old male with history of lung cancer, diastolic heart failure, HTN presented with SOB  · Patient previously was admitted here with similar complaints from 7/14-7/17  · 7/16: 2D echo reviewed, EF 58% with grade 1 diastolic dysfunction  · 8/1: ProBNP elevated 1600, similar as previous  · Physical exam: +2 pitting of lower extremity mostly around the ankles still present, more likely due to hypoalbuminemia  · Weight of 179 lb on admission, wife reports baseline weight of 172-174, stable at 170 lb  · Cardiology saw this am, plans to discharge patient home on Demadex 10 mg, considering discharging home with KCL 10 mEq, signed off  · Carvedilol decreased to 3 125 mg BID, continue   · Torsemide 10 mg daily restarted today, continue  BP has been stable  · Cardiology suggests peripheral edema more suggestive of lymphocytic spread of carcinoma than congestive heart failure   SOB is likely a combination of known lung cancer, acute CHF, deconditioning, and potential PE  · Renal function did show some improvement today, was 1 44 yesterday and is 1 29 today, continue monitoring BMP daily  · Continue daily weights  · Patient reports that he feels ready for discharge tomorrow, as he wanted to wait to see the plastic surgeon and ensure he has proper follow-up with PT/OT on discharge  · PT and OT following, family and patient prefer home PT/OT upon discharge  · Discussed with case management, patient has home PT/OT setup and ready upon discharge

## 2021-08-04 NOTE — ASSESSMENT & PLAN NOTE
Malnutrition Findings:   Adult Malnutrition type: Acute illness  Adult Degree of Malnutrition: Other severe protein calorie malnutrition (Sever pro/rené malnutrition r/t Metastatic lung Ca as evidenced by 16lb (9%) unplanned wt loss since 7/2/21, consuming <75%energy intake compared to est energy needs in 1 month  Treated with Dysphagia lvl 3 diet and Ensure Compact tid)    BMI Findings: Body mass index is 25 98 kg/m²       · Continue dysphagia mechanical soft diet, thin liquids and nutritional supplements  · Patient's appetite is improving, ate some breakfast and was looking forward to eating lunch

## 2021-08-04 NOTE — PROGRESS NOTES
[address] [facility]  Progress Note - Lilia Nageotte 1935, 82 y o  male MRN: [id number]  Unit/Bed#: E2 -01 Encounter: [id number]  Primary Care Provider: Nicolás Mosher DO   Date and time admitted to hospital: 8/1/2021 12:39 PM    * Acute on chronic diastolic (congestive) heart failure (HCC)  Assessment & Plan  Wt Readings from Last 3 Encounters:   08/04/21 77 5 kg (170 lb 13 7 oz)   07/16/21 80 2 kg (176 lb 12 9 oz)   07/02/21 84 6 kg (186 lb 6 4 oz)     · 22-year-old male with history of lung cancer, diastolic heart failure, HTN presented with SOB  · Patient previously was admitted here with similar complaints from 7/14-7/17  · 7/16: 2D echo reviewed, EF 58% with grade 1 diastolic dysfunction  · 8/1: ProBNP elevated 1600, similar as previous  · Physical exam: +2 pitting of lower extremity mostly around the ankles still present, more likely due to hypoalbuminemia  · Weight of 179 lb on admission, wife reports baseline weight of 172-174, stable at 170 lb  · Cardiology saw this am, plans to discharge patient home on Demadex 10 mg, considering discharging home with KCL 10 mEq, signed off  · Carvedilol decreased to 3 125 mg BID, continue   · Torsemide 10 mg daily restarted today, continue  BP has been stable  · Cardiology suggests peripheral edema more suggestive of lymphocytic spread of carcinoma than congestive heart failure   SOB is likely a combination of known lung cancer, acute CHF, deconditioning, and potential PE  · Renal function did show some improvement today, was 1 44 yesterday and is 1 29 today, continue monitoring BMP daily  · Continue daily weights  · Patient reports that he feels ready for discharge tomorrow, as he wanted to wait to see the plastic surgeon and ensure he has proper follow-up with PT/OT on discharge  · PT and OT following, family and patient prefer home PT/OT upon discharge  · Discussed with case management, patient has home PT/OT setup and ready upon discharge        Deep vein thrombosis (DVT) of popliteal vein of right lower extremity (HCC)  Assessment & Plan  · 8/2 doppler US B/L LEs: there is evidence of non-occlusive deep vein thrombosis noted in 1 of 2 popliteal veins  , likely chronic  · 8/3 V/Q scan: showed low probability for pulmonary thromboembolism  · Discussed with patient the likelihood of having a pulmonary embolism with a known DVT, reassured patient that while he is on Eliquis it will help decrease risk of new clots forming, and also reinforced that the V/Q scan showed a low probability of a PE present  · Continue loading dose of Eliquis at 10 mg BID for remaining 6 days, then patient will be taking 5 mg BID for maintenance  Pt will need to continue blood thinners upon discharge  · Patient's Cr continues to improve and is close to baseline, benefit of VTE prophylaxis outweighs risk of kidney injury  · Continue monitoring patient for SOB, worsening respiratory status, decreased SpO2    Severe protein-calorie malnutrition (HCC)  Assessment & Plan  Malnutrition Findings:   Adult Malnutrition type: Acute illness  Adult Degree of Malnutrition: Other severe protein calorie malnutrition (Sever pro/rené malnutrition r/t Metastatic lung Ca as evidenced by 16lb (9%) unplanned wt loss since 7/2/21, consuming <75%energy intake compared to est energy needs in 1 month  Treated with Dysphagia lvl 3 diet and Ensure Compact tid)    BMI Findings: Body mass index is 25 98 kg/m²       · Continue dysphagia mechanical soft diet, thin liquids and nutritional supplements  · Patient's appetite is improving, ate some breakfast and was looking forward to eating lunch      Acute kidney injury [facility])  Assessment & Plan  · Baseline Cr around 1 2, presented on admission at 1 76  · Of note, during last discharge on 07/17/2021 creatinine was 1 7  · Creatinine close to baseline at 1 29, torsemide 10 mg given this a m   · Much improved, continue to monitor renal function daily    Cancer related pain  Assessment & Plan  · Continue current pain regimen  · Plan for family meeting tomorrow to discuss pain management upon discharge    Malignant neoplasm of upper lobe of right lung 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; 6/2/2021 Initial Diagnosis   · Follows  Oncology, receiving palliative chemo on Keytruda and radiation  · Patient recently started and has only had 2 doses of Keytruda, last dose on 7/31  · Gamma knife procedure: scheduled for 8/6, patient reported to Oncology and myself that he still wishes to go forward with this procedure if he is discharged by then    · Oncology consulted and saw patient yesterday, patient is going to be scheduled for outpatient Oncology follow-up with [facility], patient and family chose to transition care from  to [facility]  · Plan to have family meeting tomorrow with myself and attending physician and palliative care regarding patient's goals of care, as patient's daughter continues to express concern for patient's condition and whether or not continuing treatment for his cancer is beneficial, with quality of life as the 1st priority    Hypertension  Assessment & Plan  · Cardiology decreased carvedilol to 3 125 mg BID  · BP has been stable, no hypotension noted    Tremor, essential  Assessment & Plan  · Continue primidone    Pressure injury of skin of sacral region  Assessment & Plan  · Wound sacral ulcer present on admission  · Wound care following, patient well follow-up outpatient for continued wound care  · Patient also has sutures on forehead, reports there was a skin mass that his plastic surgeon removed, was scheduled to have sutures removed this Friday 8/6  · Patient's family requested plastic surgery to look at sutures, plastic surgery consulted    Shortness of breath  Assessment & Plan  · Patient denies significant SOB/difficulty breathing today, notes that it has been much better since he 1st arrived  · O2 sats have been stable at 92% or greater on room air, no episodes of hypoxia for the past couple of days  · DVT found in right LE 8/2, non-occluding  · V/Q scan 8/3 showed low probability of PE  · Patient likely will not need oxygen for home, sats well on room air at rest, recommend checking O2 sats with ambulation prior to discharge      VTE Pharmacologic Prophylaxis: VTE Score: 9 Patient on Eliquis, no mechanical VTE prophylaxis secondary to DVT in right lower extremity    Patient Centered Rounds: I performed bedside rounds with nursing staff today  Discussions with Specialists or Other Care Team Provider:  Case management    Education and Discussions with Family / Patient: Planned family meeting tomorrow  Time Spent for Care: 45 minutes  More than 50% of total time spent on counseling and coordination of care as described above  Current Length of Stay: 3 day(s)  Current Patient Status: Inpatient   Certification Statement: The patient will continue to require additional inpatient hospital stay due to Pending setting up home PT/OT upon discharge and plastic surgery consult  Patient also stated he did not yet feel ready for discharge today  Discharge Plan: Anticipate discharge tomorrow to home with home services  Code Status: Level 1 - Full Code    Subjective:   Patient is an 59-year-old male, admission day 3 for shortness of breath likely from a combination of medical conditions, as he has a history of stage IV lung cancer and CHF  Patient is alert and interactive, very animated this morning making jokes with myself and nursing staff  Patient reports he ate some of his breakfast this morning, was looking forward to eating his lunch in the afternoon  Patient does note that his sacral wound is causing him discomfort, with some relief with position changes, but overall states that his pain is still fairly well controlled      Objective:     Vitals:   Temp (24hrs), Av 1 °F (36 7 °C), Min:97 4 °F (36 3 °C), Max:98 6 °F (37 °C)    Temp:  [97 4 °F (36 3 °C)-98 6 °F (37 °C)] 97 9 °F (36 6 °C)  HR:  [84-92] 92  Resp:  [16-20] 18  BP: (120-164)/(56-96) 131/56  SpO2:  [92 %-96 %] 92 %  Body mass index is 25 98 kg/m²  Input and Output Summary (last 24 hours): Intake/Output Summary (Last 24 hours) at 2021 1505  Last data filed at 2021 0700  Gross per 24 hour   Intake 120 ml   Output --   Net 120 ml       Physical Exam:   Physical Exam  Constitutional:       Appearance: Normal appearance  He is not ill-appearing or diaphoretic  HENT:      Head: Normocephalic and atraumatic  Comments: Sutures on forehead still intact, clean dry well healed     Mouth/Throat:      Mouth: Mucous membranes are moist    Eyes:      Extraocular Movements: Extraocular movements intact  Conjunctiva/sclera: Conjunctivae normal    Cardiovascular:      Rate and Rhythm: Normal rate and regular rhythm  Pulses: Normal pulses  Heart sounds: Normal heart sounds  No murmur heard  No friction rub  No gallop  Pulmonary:      Effort: Pulmonary effort is normal       Breath sounds: Rales present  Comments: Mild rales noted on right lower lobe, similar to yesterday  Abdominal:      General: Abdomen is flat  Bowel sounds are normal       Palpations: Abdomen is soft  Tenderness: There is no abdominal tenderness  Musculoskeletal:      Right lower leg: Edema present  Left lower leg: Edema present  Comments: 2+ pitting edema on bilateral ankles, 1 as well as tenderness to palpation of the ankles, unchanged from yesterday   Skin:     General: Skin is warm  Capillary Refill: Capillary refill takes less than 2 seconds  Findings: No lesion or rash  Neurological:      Mental Status: He is alert and oriented to person, place, and time     Psychiatric:         Mood and Affect: Mood normal          Behavior: Behavior normal           Additional Data: Labs:  Results from last 7 days   Lab Units 08/02/21  0619   WBC Thousand/uL 7 14   HEMOGLOBIN g/dL 11 5*   HEMATOCRIT % 35 0*   PLATELETS Thousands/uL 195   NEUTROS PCT % 85*   LYMPHS PCT % 4*   MONOS PCT % 8   EOS PCT % 2     Results from last 7 days   Lab Units 08/04/21  0530 08/01/21  1253   SODIUM mmol/L 138 140   POTASSIUM mmol/L 4 4 3 4*   CHLORIDE mmol/L 98* 98*   CO2 mmol/L 32 34*   BUN mg/dL 54* 68*   CREATININE mg/dL 1 29 1 76*   ANION GAP mmol/L 8 8   CALCIUM mg/dL 9 2 8 5   ALBUMIN g/dL  --  1 8*   TOTAL BILIRUBIN mg/dL  --  0 33   ALK PHOS U/L  --  100   ALT U/L  --  151*   AST U/L  --  49*   GLUCOSE RANDOM mg/dL 137 147*     Results from last 7 days   Lab Units 08/01/21  1253   INR  1 47*                   Lines/Drains:  Invasive Devices     Peripheral Intravenous Line            Peripheral IV 08/01/21 Right Forearm 3 days                      Imaging: Reviewed radiology reports from this admission including: chest xray, ultrasound(s), ECHO and V/Q scan    Recent Cultures (last 7 days):         Last 24 Hours Medication List:   Current Facility-Administered Medications   Medication Dose Route Frequency Provider Last Rate    albuterol  2 puff Inhalation Q4H PRN Barrington Kong MD      apixaban  10 mg Oral BID April TAWNYA Campo      aspirin  81 mg Oral Daily Barrington Kong MD      carvedilol  3 125 mg Oral BID With Meals Kenton Cruz MD      levothyroxine  75 mcg Oral Early Morning Barrington Kong MD      morphine  30 mg Oral Q12H [facility] Barrington Kong MD      oxyCODONE  10 mg Oral Q4H PRN Barrington Kong MD      polyethylene glycol  17 g Oral Daily Barrington Kong MD      pravastatin  20 mg Oral Daily With Eun Doran MD      primidone  50 mg Oral Q12H [facility], MD      prochlorperazine  10 mg Oral Q6H PRN Barrington Kong MD      tiotropium  18 mcg Inhalation Daily Barrington Kong MD      And    salmeterol  1 puff Inhalation BID Barrington Kong MD      senna-docusate sodium  1 tablet Oral Before Tosha Myrick MD      torsemide  10 mg Oral Daily Rao Wilson MD          Today, Patient Was Seen By: Kamilah Hong PA-C    **Please Note: This note may have been constructed using a voice recognition system  **

## 2021-08-04 NOTE — ASSESSMENT & PLAN NOTE
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, patient and family chose to transition care from  to [facility]  · Plan to have family meeting tomorrow with myself and attending physician and palliative care regarding patient's goals of care, as patient's daughter continues to express concern for patient's condition and whether or not continuing treatment for his cancer is beneficial, with quality of life as the 1st priority

## 2021-08-05 PROBLEM — T14.8XXA SUTURE OF SKIN WOUND: Chronic | Status: ACTIVE | Noted: 2021-08-05

## 2021-08-05 PROBLEM — N17.9 ACUTE KIDNEY INJURY (HCC): Status: RESOLVED | Noted: 2021-07-14 | Resolved: 2021-08-05

## 2021-08-05 LAB
ANION GAP SERPL CALCULATED.3IONS-SCNC: 10 MMOL/L (ref 4–13)
BUN SERPL-MCNC: 51 MG/DL (ref 5–25)
CALCIUM SERPL-MCNC: 9.1 MG/DL (ref 8.3–10.1)
CHLORIDE SERPL-SCNC: 95 MMOL/L (ref 100–108)
CO2 SERPL-SCNC: 29 MMOL/L (ref 21–32)
CREAT SERPL-MCNC: 1.27 MG/DL (ref 0.6–1.3)
GFR SERPL CREATININE-BSD FRML MDRD: 52 ML/MIN/1.73SQ M
GLUCOSE SERPL-MCNC: 112 MG/DL (ref 65–140)
POTASSIUM SERPL-SCNC: 4.6 MMOL/L (ref 3.5–5.3)
SODIUM SERPL-SCNC: 134 MMOL/L (ref 136–145)

## 2021-08-05 PROCEDURE — 99233 SBSQ HOSP IP/OBS HIGH 50: CPT | Performed by: INTERNAL MEDICINE

## 2021-08-05 PROCEDURE — 80048 BASIC METABOLIC PNL TOTAL CA: CPT | Performed by: INTERNAL MEDICINE

## 2021-08-05 PROCEDURE — 99223 1ST HOSP IP/OBS HIGH 75: CPT | Performed by: INTERNAL MEDICINE

## 2021-08-05 RX ADMIN — MORPHINE SULFATE 30 MG: 15 TABLET, EXTENDED RELEASE ORAL at 09:04

## 2021-08-05 RX ADMIN — CARVEDILOL 3.12 MG: 3.12 TABLET, FILM COATED ORAL at 09:06

## 2021-08-05 RX ADMIN — APIXABAN 10 MG: 5 TABLET, FILM COATED ORAL at 18:40

## 2021-08-05 RX ADMIN — APIXABAN 10 MG: 5 TABLET, FILM COATED ORAL at 09:05

## 2021-08-05 RX ADMIN — TIOTROPIUM BROMIDE 18 MCG: 18 CAPSULE ORAL; RESPIRATORY (INHALATION) at 09:02

## 2021-08-05 RX ADMIN — MORPHINE SULFATE 30 MG: 15 TABLET, EXTENDED RELEASE ORAL at 20:50

## 2021-08-05 RX ADMIN — POLYETHYLENE GLYCOL 3350 17 G: 17 POWDER, FOR SOLUTION ORAL at 09:06

## 2021-08-05 RX ADMIN — DOCUSATE SODIUM AND SENNOSIDES 1 TABLET: 8.6; 5 TABLET, FILM COATED ORAL at 15:58

## 2021-08-05 RX ADMIN — SALMETEROL XINAFOATE 1 PUFF: 50 POWDER, METERED ORAL; RESPIRATORY (INHALATION) at 09:03

## 2021-08-05 RX ADMIN — PRIMIDONE 50 MG: 50 TABLET ORAL at 09:08

## 2021-08-05 RX ADMIN — ASPIRIN 81 MG CHEWABLE TABLET 81 MG: 81 TABLET CHEWABLE at 09:05

## 2021-08-05 RX ADMIN — PRAVASTATIN SODIUM 20 MG: 10 TABLET ORAL at 15:58

## 2021-08-05 RX ADMIN — SALMETEROL XINAFOATE 1 PUFF: 50 POWDER, METERED ORAL; RESPIRATORY (INHALATION) at 18:39

## 2021-08-05 RX ADMIN — LEVOTHYROXINE SODIUM 75 MCG: 75 TABLET ORAL at 05:57

## 2021-08-05 RX ADMIN — PRIMIDONE 50 MG: 50 TABLET ORAL at 20:50

## 2021-08-05 RX ADMIN — TORSEMIDE 10 MG: 20 TABLET ORAL at 09:05

## 2021-08-05 NOTE — ASSESSMENT & PLAN NOTE
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; 6/2/2021 Initial Diagnosis   · Patient was following  Oncology, receiving palliative chemo on Keytruda and radiation  · Patient recently started and has only had 2 doses of Keytruda, last dose on 7/31  · Gamma knife procedure for brain met: was scheduled for 8/6, patient's wife had canceled procedure due to current hospitalization  · Oncology saw patient 8/3  · Family meeting conducted this afternoon 8/5 with myself, attending physician, patient care manager, palliative care  Oncology and case management informed  · Plan to have patient follow-up with [facility] oncology outpatient to discuss alternative options for immuno therapy, as [facility] has caused many side effects for the patient, and likely is the reason for his recent and current hospitalization  · Patient's family is going to call [facility] to try to reschedule gamma knife procedure, as they have already established care   · Discussed with case management: plan to order a hospital bed for patient's home, and set up palliative care for home visits through [facility], goal to help patient manage symptoms and pain while in treatment

## 2021-08-05 NOTE — ASSESSMENT & PLAN NOTE
· Patient arrived to the hospital with sutures in place on left side of forehead, was from a skin cancer removal prior to admission on 07/24  · Plastic surgery consulted, was able to see patient on 08/04 and remove remaining sutures    Steri-Strips placed

## 2021-08-05 NOTE — ASSESSMENT & PLAN NOTE
Wt Readings from Last 3 Encounters:   08/05/21 78 1 kg (172 lb 2 9 oz)   07/16/21 80 2 kg (176 lb 12 9 oz)   07/02/21 84 6 kg (186 lb 6 4 oz)     · 29-year-old male with history of lung cancer, diastolic heart failure, HTN presented with SOB  · Patient previously was admitted here with similar complaints from 7/14-7/17  · 7/16: 2D echo reviewed, EF 58% with grade 1 diastolic dysfunction  · 8/1: ProBNP elevated 1600, similar as previous  · Physical exam: +2 pitting of lower extremity mostly around the ankles still present, more likely due to hypoalbuminemia  · Cardiology signed off 8/4  Cardiology suggested peripheral edema more suggestive of lymphocytic spread of carcinoma than CHF  SOB is likely a combination of known lung cancer, acute CHF, and deconditioning  · Carvedilol decreased to 3 125 mg BID on 8/4, continue   · Torsemide 10 mg daily restarted 8/4, continue  BP has been stable  · Considering discharging home with KCL 10 mEq  · Weight of 179 lb on admission, wife reports baseline weight of 172-174, stable at 172 lb today 8/5  · Continue daily weights  · Renal function improved and close to baseline, creatinine at 1 27 today 8/5, can discontinue monitoring BMP daily  · Medically, patient is stable and ready for discharge  Discharge will likely be tomorrow, as arrangements have to be made for outpatient follow-up with Oncology, and equipment for home with case management  Home health services and PT/OT is ready when patient is discharged

## 2021-08-05 NOTE — ASSESSMENT & PLAN NOTE
Malnutrition Findings:   Adult Malnutrition type: Acute illness  Adult Degree of Malnutrition: Other severe protein calorie malnutrition (Sever pro/rené malnutrition r/t Metastatic lung Ca as evidenced by 16lb (9%) unplanned wt loss since 7/2/21, consuming <75%energy intake compared to est energy needs in 1 month  Treated with Dysphagia lvl 3 diet and Ensure Compact tid)    BMI Findings: Body mass index is 26 18 kg/m²       · Continue dysphagia mechanical soft diet, thin liquids and nutritional supplements  · Patient's appetite is improving, continue encouraging meals and incorporating protein

## 2021-08-05 NOTE — ASSESSMENT & PLAN NOTE
· Sacral wound present on admission, has been following up with Plastic surgery outpatient  · Plastic surgery saw the patient 8/4, recommended soft dressings and continuing to keep pressure off wound  Surgical intervention would not be indicated at this time    · Patient reports continued pain/discomfort from his sacral wound, encourage position changes frequently  · Wound care following in-hospital, patient will follow-up outpatient for continued wound care

## 2021-08-05 NOTE — CASE MANAGEMENT
Message left for [facility] [phone] to set up home palliative care    [facility] did not accept  [facility] did accept and discussed with pt and wife; they are in agreement with this  CM ordered hospital bed through [facility]  Told wife to expect delivery tomorrow  She requests Manoj Soto calls her at [phone]  She also requests CM call pt's CM through [facility] [phone]  CM called and left VM

## 2021-08-05 NOTE — PLAN OF CARE
Problem: Potential for Falls  Goal: Patient will remain free of falls  Description: INTERVENTIONS:  - Educate patient/family on patient safety including physical limitations  - Instruct patient to call for assistance with activity   - Consult OT/PT to assist with strengthening/mobility   - Keep Call bell within reach  - Keep bed low and locked with side rails adjusted as appropriate  - Keep care items and personal belongings within reach  - Initiate and maintain comfort rounds  - Make Fall Risk Sign visible to staff  - Apply yellow socks and bracelet for high fall risk patients  - Consider moving patient to room near nurses station  Outcome: Progressing     Problem: Nutrition/Hydration-ADULT  Goal: Nutrient/Hydration intake appropriate for improving, restoring or maintaining nutritional needs  Description: Monitor and assess patient's nutrition/hydration status for malnutrition  Collaborate with interdisciplinary team and initiate plan and interventions as ordered  Monitor patient's weight and dietary intake as ordered or per policy  Utilize nutrition screening tool and intervene as necessary  Determine patient's food preferences and provide high-protein, high-caloric foods as appropriate       INTERVENTIONS:  - Monitor oral intake, urinary output, labs, and treatment plans  - Assess nutrition and hydration status and recommend course of action  - Evaluate amount of meals eaten  - Assist patient with eating if necessary   - Allow adequate time for meals  - Recommend/ encourage appropriate diets, oral nutritional supplements, and vitamin/mineral supplements  - Order, calculate, and assess calorie counts as needed  - Recommend, monitor, and adjust tube feedings and TPN/PPN based on assessed needs  - Assess need for intravenous fluids  - Provide specific nutrition/hydration education as appropriate  - Include patient/family/caregiver in decisions related to nutrition  Outcome: Progressing     Problem: MOBILITY - ADULT  Goal: Maintain or return to baseline ADL function  Description: INTERVENTIONS:  -  Assess patient's ability to carry out ADLs; assess patient's baseline for ADL function and identify physical deficits which impact ability to perform ADLs (bathing, care of mouth/teeth, toileting, grooming, dressing, etc )  - Assess/evaluate cause of self-care deficits   - Assess range of motion  - Assess patient's mobility; develop plan if impaired  - Assess patient's need for assistive devices and provide as appropriate  - Encourage maximum independence but intervene and supervise when necessary  - Involve family in performance of ADLs  - Assess for home care needs following discharge   - Consider OT consult to assist with ADL evaluation and planning for discharge  - Provide patient education as appropriate  Outcome: Progressing  Goal: Maintains/Returns to pre admission functional level  Description: INTERVENTIONS:  - Perform BMAT or MOVE assessment daily    - Set and communicate daily mobility goal to care team and patient/family/caregiver     - Collaborate with rehabilitation services on mobility goals if consulted  - Out of bed for toileting  - Record patient progress and toleration of activity level   Outcome: Progressing     Problem: Prexisting or High Potential for Compromised Skin Integrity  Goal: Skin integrity is maintained or improved  Description: INTERVENTIONS:  - Identify patients at risk for skin breakdown  - Assess and monitor skin integrity  - Assess and monitor nutrition and hydration status  - Monitor labs   - Assess for incontinence   - Turn and reposition patient  - Assist with mobility/ambulation  - Relieve pressure over bony prominences  - Avoid friction and shearing  - Provide appropriate hygiene as needed including keeping skin clean and dry  - Evaluate need for skin moisturizer/barrier cream  - Collaborate with interdisciplinary team   - Patient/family teaching  - Consider wound care consult   Outcome: Progressing     Problem: SKIN/TISSUE INTEGRITY - ADULT  Goal: Skin Integrity remains intact(Skin Breakdown Prevention)  Description: Assess:  -Assess extremities for adequate circulation and sensation     Bed Management:  -Have minimal linens on bed & keep smooth, unwrinkled  -Change linens as needed when moist or perspiring    Toileting:  -Offer bedside commode    Skin Care:  -Avoid use of baby powder, tape, friction and shearing, hot water or constrictive clothing    Goal: Incision(s), wounds(s) or drain site(s) healing without S/S of infection  Description: INTERVENTIONS  - Assess and document dressing, incision, wound bed, drain sites and surrounding tissue  - Provide patient and family education  Outcome: Progressing    Goal: Pressure injury heals and does not worsen  Description: Interventions:  - Implement low air loss mattress or specialty surface (Criteria met)  - Apply silicone foam dressing  - Apply fecal or urinary incontinence containment device   - Utilize friction reducing device or surface for transfers   - Consider nutrition services referral as needed  Outcome: Progressing

## 2021-08-05 NOTE — PROGRESS NOTES
[address]  Progress Note - Nelson Smith 1935, 82 y o  male MRN: [id number]  Unit/Bed#: E2 -01 Encounter: [id number]  Primary Care Provider: Orlin Mariano DO   Date and time admitted to hospital: 8/1/2021 12:39 PM    * Acute on chronic diastolic (congestive) heart failure (HCC)  Assessment & Plan  Wt Readings from Last 3 Encounters:   08/05/21 78 1 kg (172 lb 2 9 oz)   07/16/21 80 2 kg (176 lb 12 9 oz)   07/02/21 84 6 kg (186 lb 6 4 oz)     · 27-year-old male with history of lung cancer, diastolic heart failure, HTN presented with SOB  · Patient previously was admitted here with similar complaints from 7/14-7/17  · 7/16: 2D echo reviewed, EF 58% with grade 1 diastolic dysfunction  · 8/1: ProBNP elevated 1600, similar as previous  · Physical exam: +2 pitting of lower extremity mostly around the ankles still present, more likely due to hypoalbuminemia  · Cardiology signed off 8/4  Cardiology suggested peripheral edema more suggestive of lymphocytic spread of carcinoma than CHF  SOB is likely a combination of known lung cancer, acute CHF, and deconditioning  · Will discharge patient home on Demadex 10 mg, considering discharging home with KCL 10 mEq  · Carvedilol decreased to 3 125 mg BID on 8/4, continue   · Torsemide 10 mg daily restarted 8/4, continue  BP has been stable  · Weight of 179 lb on admission, wife reports baseline weight of 172-174, stable at 172 lb today 8/5  · Continue daily weights  · Renal function improved and close to baseline, creatinine at 1 27 today 8/5, can discontinue monitoring BMP daily  · Medically, patient is stable and ready for discharge  Discharge will likely be tomorrow, as arrangements have to be made for outpatient follow-up with Oncology, and equipment for home with case management  Home health services and PT/OT is ready when patient is discharged          Deep vein thrombosis (DVT) of popliteal vein of right lower extremity (HCC)  Assessment & Plan  · 8/2 doppler US B/L LEs: there is evidence of non-occlusive deep vein thrombosis noted in 1 of 2 popliteal veins  , likely chronic  · 8/3 V/Q scan: showed low probability for pulmonary thromboembolism  · Patient has low likelihood of having a PE, is on Eliquis for VTE prophylaxis  · Continue loading dose of Eliquis at 10 mg BID for remaining 5 days, then patient will be taking 5 mg BID for maintenance  Pt will continue upon discharge  · Patient's Cr continues to improve and is close to baseline, benefit of VTE prophylaxis outweighs risk of kidney injury  · Per RN and patient, patient was found using chewing tobacco this morning 8/5, educated on increased risk of clotting with nicotine use, patient gave permission to throw out chewing tobacco  · Continue monitoring patient for SOB, worsening respiratory status, decreased SpO2    Pressure injury of skin of sacral region  Assessment & Plan  · Sacral wound present on admission, has been following up with Plastic surgery outpatient  · Plastic surgery saw the patient 8/4, recommended soft dressings and continuing to keep pressure off wound  Surgical intervention would not be indicated at this time    · Patient reports continued pain/discomfort from his sacral wound, encourage position changes frequently  · Wound care following in-hospital, patient will follow-up outpatient for continued wound care    Shortness of breath  Assessment & Plan  · Patient denies significant SOB/difficulty breathing today, notes that it has been much better since he 1st arrived  · O2 sats have been stable at 92% or greater on room air, no episodes of hypoxia for the past couple of days  · DVT found in right LE 8/2, non-occluding  · V/Q scan 8/3 showed low probability of PE  · Patient likely will not need oxygen for home, sats well on room air at rest, recommend checking O2 sats with ambulation prior to discharge    Severe protein-calorie malnutrition ([facility] [address] )  Assessment & Plan  Malnutrition Findings:   Adult Malnutrition type: Acute illness  Adult Degree of Malnutrition: Other severe protein calorie malnutrition (Sever pro/rené malnutrition r/t Metastatic lung Ca as evidenced by 16lb (9%) unplanned wt loss since 7/2/21, consuming <75%energy intake compared to est energy needs in 1 month  Treated with Dysphagia lvl 3 diet and Ensure Compact tid)    BMI Findings: Body mass index is 26 18 kg/m²  · Continue dysphagia mechanical soft diet, thin liquids and nutritional supplements  · Patient's appetite is improving, continue encouraging meals and incorporating protein      Malignant neoplasm of upper lobe of right lung [facility])  Assessment & Plan  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; 6/2/2021 Initial Diagnosis   · Patient was following  Oncology, receiving palliative chemo on Keytruda and radiation  · Patient recently started and has only had 2 doses of Keytruda, last dose on 7/31  · Gamma knife procedure for brain met: was scheduled for 8/6, patient's wife had canceled procedure due to current hospitalization  · Oncology saw patient 8/3  · Family meeting conducted this afternoon 8/5 with myself, attending physician, patient care manager, palliative care  Oncology and case management informed  · Plan to have patient follow-up with [facility] oncology outpatient to discuss alternative options for immuno therapy, as Hamp Soha has caused many side effects for the patient, and likely is the reason for his recent and current hospitalization  · Patient's family is going to call [facility] to try to reschedule gamma knife procedure, as they have already established care   · Discussed with case management: plan to order a hospital bed for patient's home, and set up palliative care for home visits through [facility], goal to help patient manage symptoms and pain while in treatment       Suture of skin wound  Assessment & Plan  · Patient arrived to the hospital with sutures in place on left side of forehead, was from a skin cancer removal prior to admission on 07/24  · Plastic surgery consulted, was able to see patient on 08/04 and remove remaining sutures  Steri-Strips placed    Cancer related pain  Assessment & Plan  · Pain controlled well, can continue and patient can likely be discharged with current regimen    Tremor, essential  Assessment & Plan  · Continue primidone    Hypertension  Assessment & Plan  · Per cardiology, continue carvedilol 3 125 mg BID  · BP has been stable, no hypotension noted      VTE Pharmacologic Prophylaxis: VTE Score: 9 Patient on Eliquis, SCDs not indicated secondary to DVT in lower extremity    Patient Centered Rounds: I performed bedside rounds with nursing staff today  Discussions with Specialists or Other Care Team Provider:  Palliative care, case management    Education and Discussions with Family / Patient: Updated  (wife and son) at bedside  Of note, patient and patient's wife have asked to not update/contact the patient's daughter  Patient's daughters contact information removed from chart  Time Spent for Care: 90 minutes  More than 50% of total time spent on counseling and coordination of care as described above  Current Length of Stay: 4 day(s)  Current Patient Status: Inpatient   Certification Statement: The patient will continue to require additional inpatient hospital stay due to Pending outpatient follow-up and home management services set up  Discharge Plan: Anticipate discharge tomorrow to home with home services  Code Status: Level 1 - Full Code    Subjective:   Patient is an 53-year-old male, history of stage IV lung cancer with metastases to brain, CHF, hypertension, on admission day 4 for shortness of breath  This is patient's 2nd admission for similar issue  Patient today is well, states his appetite continues to improve every day   Per RN and patient, patient was found using chewing tobacco this morning , was educated on increased risk of clotting with nicotine use, patient gave permission to throw out chewing tobacco     There was a family meeting today with patient, patient's wife, and patient's son, and patient's wife expressed concern for making sure the patient has proper follow-up upon discharge  Both the patient and the patient's wife had advocated that they want to continue treatment for the patient's cancer until they have exhausted all options  They also stated that they wanted the patient's daughter to not be involved in the medical care/decision making for the patient, as both the wife and patient agree that they still want to make medical decisions for the patient  Patient and patient's wife asked patient's daughter to be removed from the contact list     Objective:     Vitals:   Temp (24hrs), Av °F (36 7 °C), Min:97 6 °F (36 4 °C), Max:98 3 °F (36 8 °C)    Temp:  [97 6 °F (36 4 °C)-98 3 °F (36 8 °C)] 98 3 °F (36 8 °C)  HR:  [] 100  Resp:  [16-18] 18  BP: (132-149)/(58-62) 149/62  SpO2:  [93 %-96 %] 96 %  Body mass index is 26 18 kg/m²  Input and Output Summary (last 24 hours): Intake/Output Summary (Last 24 hours) at 2021 1508  Last data filed at 2021 0700  Gross per 24 hour   Intake 120 ml   Output --   Net 120 ml       Physical Exam:   Physical Exam  Vitals and nursing note reviewed  Constitutional:       General: He is not in acute distress  Appearance: Normal appearance  He is not ill-appearing  HENT:      Head: Normocephalic and atraumatic  Comments: Well-healed incision on forehead with 2 Steri-Strips     Mouth/Throat:      Mouth: Mucous membranes are dry  Eyes:      Extraocular Movements: Extraocular movements intact  Conjunctiva/sclera: Conjunctivae normal    Cardiovascular:      Rate and Rhythm: Normal rate and regular rhythm  Pulses: Normal pulses        Heart sounds: Normal heart sounds  No murmur heard  No friction rub  No gallop  Pulmonary:      Effort: Pulmonary effort is normal  No respiratory distress  Breath sounds: Rales present  No wheezing  Comments: Mild rales noted on middle-right lower lobe, similar to yesterday 8/4  Abdominal:      General: Bowel sounds are normal  There is no distension  Palpations: Abdomen is soft  Tenderness: There is no abdominal tenderness  Musculoskeletal:         General: Swelling and tenderness present  Cervical back: Neck supple  Right lower leg: Edema present  Left lower leg: Edema present  Comments: 2+ pitting edema on bilateral ankles, with tenderness to palpation on bilateral ankles  Of note, right lower extremity slightly more edematous than left, consistent with DVT present in right lower extremity   Skin:     General: Skin is warm  Findings: No erythema or lesion  Neurological:      General: No focal deficit present  Mental Status: He is alert     Psychiatric:         Mood and Affect: Mood normal          Behavior: Behavior normal           Additional Data:     Labs:  Results from last 7 days   Lab Units 08/02/21  0619   WBC Thousand/uL 7 14   HEMOGLOBIN g/dL 11 5*   HEMATOCRIT % 35 0*   PLATELETS Thousands/uL 195   NEUTROS PCT % 85*   LYMPHS PCT % 4*   MONOS PCT % 8   EOS PCT % 2     Results from last 7 days   Lab Units 08/05/21  0450 08/01/21  1253   SODIUM mmol/L 134* 140   POTASSIUM mmol/L 4 6 3 4*   CHLORIDE mmol/L 95* 98*   CO2 mmol/L 29 34*   BUN mg/dL 51* 68*   CREATININE mg/dL 1 27 1 76*   ANION GAP mmol/L 10 8   CALCIUM mg/dL 9 1 8 5   ALBUMIN g/dL  --  1 8*   TOTAL BILIRUBIN mg/dL  --  0 33   ALK PHOS U/L  --  100   ALT U/L  --  151*   AST U/L  --  49*   GLUCOSE RANDOM mg/dL 112 147*     Results from last 7 days   Lab Units 08/01/21  1253   INR  1 47*                   Lines/Drains:  Invasive Devices     Peripheral Intravenous Line            Peripheral IV 08/01/21 Right Forearm 4 days                      Imaging: Reviewed radiology reports from this admission including: chest xray, ultrasound(s), ECHO and V/Q scan    Recent Cultures (last 7 days):         Last 24 Hours Medication List:   Current Facility-Administered Medications   Medication Dose Route Frequency Provider Last Rate    albuterol  2 puff Inhalation Q4H PRN Alyx Doll MD      apixaban  10 mg Oral BID Jesenia Gleason PA-C      aspirin  81 mg Oral Daily Alyx Doll MD      carvedilol  3 125 mg Oral BID With Meals Angeline Do MD      levothyroxine  75 mcg Oral Early Morning Alyx Doll MD      morphine  30 mg Oral Q12H [facility] Alyx Doll MD      oxyCODONE  10 mg Oral Q4H PRN Alyx Doll MD      polyethylene glycol  17 g Oral Daily Alyx Doll MD      pravastatin  20 mg Oral Daily With Delaney Aguilar MD      primidone  50 mg Oral Q12H [address], MD      prochlorperazine  10 mg Oral Q6H PRN Alyx Doll MD      tiotropium  18 mcg Inhalation Daily Alyx Doll MD      And    salmeterol  1 puff Inhalation BID Alyx Doll MD      senna-docusate sodium  1 tablet Oral Before Delaney Aguilar MD      torsemide  10 mg Oral Daily Angeline Do MD          Today, Patient Was Seen By: Jesenia Gleason PA-C    **Please Note: This note may have been constructed using a voice recognition system  **

## 2021-08-05 NOTE — ASSESSMENT & PLAN NOTE
· 8/2 doppler US B/L LEs: there is evidence of non-occlusive deep vein thrombosis noted in 1 of 2 popliteal veins  , likely chronic  · 8/3 V/Q scan: showed low probability for pulmonary thromboembolism  · Patient has low likelihood of having a PE, is on Eliquis for VTE prophylaxis  · Continue loading dose of Eliquis at 10 mg BID for remaining 5 days, then patient will be taking 5 mg BID for maintenance   Pt will continue upon discharge  · Patient's Cr continues to improve and is close to baseline, benefit of VTE prophylaxis outweighs risk of kidney injury  · Continue monitoring patient for SOB, worsening respiratory status, decreased SpO2

## 2021-08-06 VITALS
WEIGHT: 172.84 LBS | HEIGHT: 68 IN | DIASTOLIC BLOOD PRESSURE: 56 MMHG | HEART RATE: 93 BPM | OXYGEN SATURATION: 94 % | TEMPERATURE: 99.1 F | SYSTOLIC BLOOD PRESSURE: 148 MMHG | RESPIRATION RATE: 16 BRPM | BODY MASS INDEX: 26.2 KG/M2

## 2021-08-06 LAB
DME PARACHUTE DELIVERY DATE ACTUAL: NORMAL
DME PARACHUTE DELIVERY DATE EXPECTED: NORMAL
DME PARACHUTE DELIVERY DATE REQUESTED: NORMAL
DME PARACHUTE ITEM DESCRIPTION: NORMAL
DME PARACHUTE ORDER STATUS: NORMAL
DME PARACHUTE SUPPLIER NAME: NORMAL
DME PARACHUTE SUPPLIER PHONE: NORMAL

## 2021-08-06 PROCEDURE — 99239 HOSP IP/OBS DSCHRG MGMT >30: CPT | Performed by: INTERNAL MEDICINE

## 2021-08-06 PROCEDURE — 99497 ADVNCD CARE PLAN 30 MIN: CPT | Performed by: INTERNAL MEDICINE

## 2021-08-06 RX ORDER — TORSEMIDE 10 MG/1
10 TABLET ORAL DAILY
Qty: 30 TABLET | Refills: 0 | Status: SHIPPED | OUTPATIENT
Start: 2021-08-07

## 2021-08-06 RX ORDER — CARVEDILOL 3.12 MG/1
3.12 TABLET ORAL 2 TIMES DAILY WITH MEALS
Qty: 60 TABLET | Refills: 0 | Status: SHIPPED | OUTPATIENT
Start: 2021-08-06

## 2021-08-06 RX ORDER — TORSEMIDE 10 MG/1
10 TABLET ORAL DAILY
Qty: 30 TABLET | Refills: 0 | Status: SHIPPED | OUTPATIENT
Start: 2021-08-07 | End: 2021-08-06

## 2021-08-06 RX ORDER — CARVEDILOL 3.12 MG/1
3.12 TABLET ORAL 2 TIMES DAILY WITH MEALS
Qty: 60 TABLET | Refills: 0 | Status: SHIPPED | OUTPATIENT
Start: 2021-08-06 | End: 2021-08-06

## 2021-08-06 RX ADMIN — TORSEMIDE 10 MG: 20 TABLET ORAL at 08:04

## 2021-08-06 RX ADMIN — CARVEDILOL 3.12 MG: 3.12 TABLET, FILM COATED ORAL at 08:04

## 2021-08-06 RX ADMIN — TIOTROPIUM BROMIDE 18 MCG: 18 CAPSULE ORAL; RESPIRATORY (INHALATION) at 08:03

## 2021-08-06 RX ADMIN — SALMETEROL XINAFOATE 1 PUFF: 50 POWDER, METERED ORAL; RESPIRATORY (INHALATION) at 08:03

## 2021-08-06 RX ADMIN — POLYETHYLENE GLYCOL 3350 17 G: 17 POWDER, FOR SOLUTION ORAL at 08:03

## 2021-08-06 RX ADMIN — LEVOTHYROXINE SODIUM 75 MCG: 75 TABLET ORAL at 05:42

## 2021-08-06 RX ADMIN — MORPHINE SULFATE 30 MG: 15 TABLET, EXTENDED RELEASE ORAL at 08:04

## 2021-08-06 RX ADMIN — PRIMIDONE 50 MG: 50 TABLET ORAL at 08:03

## 2021-08-06 RX ADMIN — ASPIRIN 81 MG CHEWABLE TABLET 81 MG: 81 TABLET CHEWABLE at 08:04

## 2021-08-06 RX ADMIN — APIXABAN 10 MG: 5 TABLET, FILM COATED ORAL at 08:04

## 2021-08-06 NOTE — CASE MANAGEMENT
Pt's spouse called [employer] and [employer] contacted CM stating that the hospital bed and [facility] will be delivered to the home today  CM met with pt and dtr at bedside  Dtr reports that her brother will transport pt home today  Someone will be at the home to accept the bed  Pt will dc w/ [facility] w/ PT/OT/SN  [facility]  Dr Mchugh Cornea informed pt about his follow up appointments w/ oncology and cardiology  Pt nor dtr had any other questions  CM will continue to follow as needed

## 2021-08-06 NOTE — ASSESSMENT & PLAN NOTE
Malnutrition Findings:   Adult Malnutrition type: Acute illness  BMI Findings: Body mass index is 26 28 kg/m²       Continue dysphagia mechanical soft diet, thin liquids and nutritional supplements

## 2021-08-06 NOTE — CASE MANAGEMENT
SHERINE notified by Rn that pt's spouse called night shift with some requests  She is stating she may not be able to provide 24hr care and would like resources for caregivers in the home  CM will provide pt and spouse with a non-skilled home health care agencies list  Wife also requesting a BSC; CM will place order  Spouse stating she has not yet received a PC from [employer] about the hospital bed  CM provided spouse with the customer service phone number  CM asked for her to call back with the results  SHERINE continuing to follow

## 2021-08-06 NOTE — ASSESSMENT & PLAN NOTE
Requiring 2 L nasal cannula oxygen supplementation on admission, is now on room air  Multifactorial as above

## 2021-08-06 NOTE — PLAN OF CARE
Problem: Potential for Falls  Goal: Patient will remain free of falls  Description: INTERVENTIONS:  - Educate patient/family on patient safety including physical limitations  - Instruct patient to call for assistance with activity   - Consult OT/PT to assist with strengthening/mobility   - Keep Call bell within reach  - Keep bed low and locked with side rails adjusted as appropriate  - Keep care items and personal belongings within reach  - Initiate and maintain comfort rounds  - Make Fall Risk Sign visible to staff  - Offer Toileting every 2 Hours, in advance of need  - Initiate/Maintain bed alarm  - Apply yellow socks and bracelet for high fall risk patients  - Consider moving patient to room near nurses station  Outcome: Progressing     Problem: Nutrition/Hydration-ADULT  Goal: Nutrient/Hydration intake appropriate for improving, restoring or maintaining nutritional needs  Description: Monitor and assess patient's nutrition/hydration status for malnutrition  Collaborate with interdisciplinary team and initiate plan and interventions as ordered  Monitor patient's weight and dietary intake as ordered or per policy  Utilize nutrition screening tool and intervene as necessary  Determine patient's food preferences and provide high-protein, high-caloric foods as appropriate       INTERVENTIONS:  - Monitor oral intake, urinary output, labs, and treatment plans  - Assess nutrition and hydration status and recommend course of action  - Evaluate amount of meals eaten  - Assist patient with eating if necessary   - Allow adequate time for meals  - Recommend/ encourage appropriate diets, oral nutritional supplements, and vitamin/mineral supplements  - Order, calculate, and assess calorie counts as needed  - Recommend, monitor, and adjust tube feedings and TPN/PPN based on assessed needs  - Assess need for intravenous fluids  - Provide specific nutrition/hydration education as appropriate  - Include patient/family/caregiver in decisions related to nutrition  Outcome: Progressing     Problem: MOBILITY - ADULT  Goal: Maintain or return to baseline ADL function  Description: INTERVENTIONS:  -  Assess patient's ability to carry out ADLs; assess patient's baseline for ADL function and identify physical deficits which impact ability to perform ADLs (bathing, care of mouth/teeth, toileting, grooming, dressing, etc )  - Assess/evaluate cause of self-care deficits   - Assess range of motion  - Assess patient's mobility; develop plan if impaired  - Assess patient's need for assistive devices and provide as appropriate  - Encourage maximum independence but intervene and supervise when necessary  - Involve family in performance of ADLs  - Assess for home care needs following discharge   - Consider OT consult to assist with ADL evaluation and planning for discharge  - Provide patient education as appropriate  Outcome: Progressing  Goal: Maintains/Returns to pre admission functional level  Description: INTERVENTIONS:  - Perform BMAT or MOVE assessment daily    - Set and communicate daily mobility goal to care team and patient/family/caregiver  - Collaborate with rehabilitation services on mobility goals if consulted  - Perform Range of Motion 3 times a day  - Reposition patient every 2 hours    - Dangle patient 3 times a day  - Stand patient 3 times a day  - Ambulate patient 3 times a day  - Out of bed to chair 3 times a day   - Out of bed for meals 3 times a day  - Out of bed for toileting  - Record patient progress and toleration of activity level   Outcome: Progressing     Problem: Prexisting or High Potential for Compromised Skin Integrity  Goal: Skin integrity is maintained or improved  Description: INTERVENTIONS:  - Identify patients at risk for skin breakdown  - Assess and monitor skin integrity  - Assess and monitor nutrition and hydration status  - Monitor labs   - Assess for incontinence   - Turn and reposition patient  - Assist with mobility/ambulation  - Relieve pressure over bony prominences  - Avoid friction and shearing  - Provide appropriate hygiene as needed including keeping skin clean and dry  - Evaluate need for skin moisturizer/barrier cream  - Collaborate with interdisciplinary team   - Patient/family teaching  - Consider wound care consult   Outcome: Progressing     Problem: SKIN/TISSUE INTEGRITY - ADULT  Goal: Skin Integrity remains intact(Skin Breakdown Prevention)  Description: Assess:  -Perform Gurinder assessment every shift  -Clean and moisturize skin every shift  -Inspect skin when repositioning, toileting, and assisting with ADLS  -Assess extremities for adequate circulation and sensation     Bed Management:  -Have minimal linens on bed & keep smooth, unwrinkled  -Change linens as needed when moist or perspiring  -Avoid sitting or lying in one position for more than 2 hours while in bed  -Keep HOB at 45 degrees     Toileting:  -Offer bedside commode  -Assess for incontinence every shift    Activity:  -Mobilize patient 3 times a day  -Encourage activity and walks on unit  -Encourage or provide ROM exercises   -Turn and reposition patient every 2 Hours  -Use appropriate equipment to lift or move patient in bed  -Instruct/ Assist with weight shifting every 2 hours when out of bed in chair  -Consider limitation of chair time 2 hour intervals    Skin Care:  -Avoid use of baby powder, tape, friction and shearing, hot water or constrictive clothing  -Relieve pressure over bony prominences using allevyn patch  -Do not massage red bony areas    Next Steps:  -Teach patient strategies to minimize risks such as turning to sides every 2 hours   -Consider consults to  interdisciplinary teams such as PT  Outcome: Progressing  Goal: Incision(s), wounds(s) or drain site(s) healing without S/S of infection  Description: INTERVENTIONS  - Assess and document dressing, incision, wound bed, drain sites and surrounding tissue  - Provide patient and family education  - Perform skin care/dressing changes every shift  Outcome: Progressing  Goal: Pressure injury heals and does not worsen  Description: Interventions:  - Implement low air loss mattress or specialty surface (Criteria met)  - Apply silicone foam dressing  - Instruct/assist with weight shifting every 30 minutes when in chair   - Limit chair time to 2 hour intervals  - Use special pressure reducing interventions such as ehob cushion when in chair   - Apply fecal or urinary incontinence containment device   - Perform passive or active ROM every shift  - Turn and reposition patient & offload bony prominences every 2 hours   - Utilize friction reducing device or surface for transfers   - Consider consults to  interdisciplinary teams such as PT  - Consider nutrition services referral as needed  Outcome: Progressing     Problem: SKIN/TISSUE INTEGRITY - ADULT  Goal: Skin Integrity remains intact(Skin Breakdown Prevention)  Description: Assess:  -Perform Gurinder assessment every shift  -Clean and moisturize skin every shift  -Inspect skin when repositioning, toileting, and assisting with ADLS  -Assess extremities for adequate circulation and sensation     Bed Management:  -Have minimal linens on bed & keep smooth, unwrinkled  -Change linens as needed when moist or perspiring  -Avoid sitting or lying in one position for more than 2 hours while in bed  -Keep HOB at 45degrees     Toileting:  -Offer bedside commode  -Assess for incontinence every shift  Activity:  -Mobilize patient 3 times a day  -Encourage activity and walks on unit  -Encourage or provide ROM exercises   -Turn and reposition patient every 2 Hours  -Use appropriate equipment to lift or move patient in bed  -Instruct/ Assist with weight shifting every 2 hours  when out of bed in chair  -Consider limitation of chair time 2   hour intervals    Skin Care:  -Avoid use of baby powder, tape, friction and shearing, hot water or constrictive clothing  -Relieve pressure over bony prominences using allevyn patch  -Do not massage red bony areas    Next Steps:  -Teach patient strategies to minimize risks such as turning to sides every 2 hours   -Consider consults to  interdisciplinary teams such as PT  Outcome: Progressing  Goal: Incision(s), wounds(s) or drain site(s) healing without S/S of infection  Description: INTERVENTIONS  - Assess and document dressing, incision, wound bed, drain sites and surrounding tissue  - Provide patient and family education  - Perform skin care/dressing changes every shift  Outcome: Progressing  Goal: Pressure injury heals and does not worsen  Description: Interventions:  - Implement low air loss mattress or specialty surface (Criteria met)  - Apply silicone foam dressing  - Instruct/assist with weight shifting every 30  minutes when in chair   - Limit chair time to 2 hour intervals  - Use special pressure reducing interventions such as ehib cushion when in chair   - Apply fecal or urinary incontinence containment device   - Perform passive or active ROM every shift  - Turn and reposition patient & offload bony prominences every 2  hours   - Utilize friction reducing device or surface for transfers   - Consider consults to  interdisciplinary teams such as PT  - Consider nutrition services referral as needed  Outcome: Progressing

## 2021-08-06 NOTE — PLAN OF CARE
Problem: Potential for Falls  Goal: Patient will remain free of falls  Description: INTERVENTIONS:  - Educate patient/family on patient safety including physical limitations  - Instruct patient to call for assistance with activity   - Consult OT/PT to assist with strengthening/mobility   - Keep Call bell within reach  - Keep bed low and locked with side rails adjusted as appropriate  - Keep care items and personal belongings within reach  - Initiate and maintain comfort rounds  - Make Fall Risk Sign visible to staff  - Apply yellow socks and bracelet for high fall risk patients  - Consider moving patient to room near nurses station  Outcome: Progressing     Problem: Nutrition/Hydration-ADULT  Goal: Nutrient/Hydration intake appropriate for improving, restoring or maintaining nutritional needs  Description: Monitor and assess patient's nutrition/hydration status for malnutrition  Collaborate with interdisciplinary team and initiate plan and interventions as ordered  Monitor patient's weight and dietary intake as ordered or per policy  Utilize nutrition screening tool and intervene as necessary  Determine patient's food preferences and provide high-protein, high-caloric foods as appropriate       INTERVENTIONS:  - Monitor oral intake, urinary output, labs, and treatment plans  - Assess nutrition and hydration status and recommend course of action  - Evaluate amount of meals eaten  - Assist patient with eating if necessary   - Allow adequate time for meals  - Recommend/ encourage appropriate diets, oral nutritional supplements, and vitamin/mineral supplements  - Order, calculate, and assess calorie counts as needed  - Recommend, monitor, and adjust tube feedings and TPN/PPN based on assessed needs  - Assess need for intravenous fluids  - Provide specific nutrition/hydration education as appropriate  - Include patient/family/caregiver in decisions related to nutrition  Outcome: Progressing     Problem: MOBILITY - ADULT  Goal: Maintain or return to baseline ADL function  Description: INTERVENTIONS:  -  Assess patient's ability to carry out ADLs; assess patient's baseline for ADL function and identify physical deficits which impact ability to perform ADLs (bathing, care of mouth/teeth, toileting, grooming, dressing, etc )  - Assess/evaluate cause of self-care deficits   - Assess range of motion  - Assess patient's mobility; develop plan if impaired  - Assess patient's need for assistive devices and provide as appropriate  - Encourage maximum independence but intervene and supervise when necessary  - Involve family in performance of ADLs  - Assess for home care needs following discharge   - Consider OT consult to assist with ADL evaluation and planning for discharge  - Provide patient education as appropriate  Outcome: Progressing  Goal: Maintains/Returns to pre admission functional level  Description: INTERVENTIONS:  - Perform BMAT or MOVE assessment daily    - Set and communicate daily mobility goal to care team and patient/family/caregiver     - Collaborate with rehabilitation services on mobility goals if consulted  - Out of bed for toileting  - Record patient progress and toleration of activity level   Outcome: Progressing     Problem: Prexisting or High Potential for Compromised Skin Integrity  Goal: Skin integrity is maintained or improved  Description: INTERVENTIONS:  - Identify patients at risk for skin breakdown  - Assess and monitor skin integrity  - Assess and monitor nutrition and hydration status  - Monitor labs   - Assess for incontinence   - Turn and reposition patient  - Assist with mobility/ambulation  - Relieve pressure over bony prominences  - Avoid friction and shearing  - Provide appropriate hygiene as needed including keeping skin clean and dry  - Evaluate need for skin moisturizer/barrier cream  - Collaborate with interdisciplinary team   - Patient/family teaching  - Consider wound care consult   Outcome: Progressing     Problem: SKIN/TISSUE INTEGRITY - ADULT  Goal: Skin Integrity remains intact(Skin Breakdown Prevention)  Description: Assess:  -Inspect skin when repositioning, toileting, and assisting with ADLS  -Assess extremities for adequate circulation and sensation     Bed Management:  -Have minimal linens on bed & keep smooth, unwrinkled  -Change linens as needed when moist or perspiring    Toileting:  -Offer bedside commode    Activity:  -Encourage activity and walks on unit  -Encourage or provide ROM exercises     Skin Care:  -Avoid use of baby powder, tape, friction and shearing, hot water or constrictive clothing    Goal: Incision(s), wounds(s) or drain site(s) healing without S/S of infection  Description: INTERVENTIONS  - Assess and document dressing, incision, wound bed, drain sites and surrounding tissue  - Provide patient and family education  Outcome: Progressing  Goal: Pressure injury heals and does not worsen  Description: Interventions:  - Implement low air loss mattress or specialty surface (Criteria met)  - Apply silicone foam dressing  - Consider nutrition services referral as needed  Outcome: Progressing

## 2021-08-06 NOTE — ASSESSMENT & PLAN NOTE
Wt Readings from Last 3 Encounters:   08/06/21 78 4 kg (172 lb 13 5 oz)   07/16/21 80 2 kg (176 lb 12 9 oz)   07/02/21 84 6 kg (186 lb 6 4 oz)     · 68-year-old male with past history of lung cancer, diastolic heart failure, hypertension presented with shortness of breath  · Patient previously was admitted here several weeks prior with similar complaints discharged on 7/17  · 2D echo from last month reviewed,  EF with grade 1 diastolic dysfunction  He has reached dry weight of a 172 lbs

## 2021-08-06 NOTE — DISCHARGE INSTR - AVS FIRST PAGE
Dear Alpa Ray,     It was our pleasure to care for you here at [facility]  It is our hope that we were always able to exceed the expected standards for your care during your stay  You were hospitalized due to shortness of breath secondary to diastolic heart failure, your also found of a deep venous thrombosis  You were cared for on the 2nd floor by Garo Kim DO with the [facility] who covers for your primary care physician (PCP), Ashley Nielson DO, while you were hospitalized  If you have any questions or concerns related to this hospitalization, you may contact us at [phone]  For follow up as well as any medication refills, we recommend that you follow up with your primary care physician  A registered nurse will reach out to you by phone within a few days after your discharge to answer any additional questions that you may have after going home  However, at this time we provide for you here, the most important instructions / recommendations at discharge:     · Notable Medication Adjustments -   · You been started on Eliquis for DVT treatment, you be on the 10 mg dose for th 8/10/2021, then will be on 5 mg dose until seen by Oncology  · Carvedilol reduced to 3 125 mg twice a day  · Torsemide 10 mg once a day, potassium supplementation to be completed  · Discontinue amlodipine, discontinue clonidine  · Testing Required after Discharge -   · Repeat BMP in one week  · Important follow up information -   · Follow up with Cardiology in 1-2 weeks  · Oncology at discharge  · Other Instructions -   · Limit your total sodium intake each day to less than 2000 mg (2 grams) per day maximum  It is very important to read food labels as there is hidden sodium in various types of foods  Also, do not add salt to your food  Sodium / salt causes fluid retention and can cause your heart failure to act up again    · Limit total fluid intake to one and a half liters per day maximum (or one and a half quarts if that is easier to remember)  This includes all fluids consumed  · Take your diuretic (water pill) and other prescribed heart medications as recommended  · Follow up with your heart doctor within 1 week  · Weigh yourself daily on the same scale with the same amount of clothing each day  If you have a weight gain > 3 lbs in a day or 5 lbs in a week, please contact your cardiologist for further instructions especially if weight gain is associated with increased shortness of breath or leg swelling  For [facility] Cardiology Patients ONLY, we suggest calling the [facility] Heart Failure Program at  instead of directly calling your cardiologist   · Your weight today is 172lbs    You have been discharged on Eliquis, this is a blood thinner  -If you have any unexplained bleed, worst headache of your life, strike your head or any other body part in any manner, you need to go the Emergency room  Please do not participate in any high risk or contact sports while you are on a blood thinner  · Please review this entire after visit summary as additional general instructions including medication list, appointments, activity, diet, any pertinent wound care, and other additional recommendations from your care team that may be provided for you  Sincerely,     Saskia Wilson DO and Nurse [address] "[place]le"   G

## 2021-08-06 NOTE — DISCHARGE INSTRUCTIONS
Deep Vein Thrombosis   AMBULATORY CARE:   A deep vein thrombosis (DVT)  is a blood clot that forms in a deep vein of the body  The deep veins in the legs, thighs, and hips are the most common sites for DVT  A DVT can also occur in a deep vein within your arms  The clot prevents the normal flow of blood in the vein  The blood backs up and causes pain and swelling  The DVT can break into smaller pieces and travel to your lungs and cause a blockage called a pulmonary embolism  A pulmonary embolism can become life-threatening  Common symptoms include the following:   · Swelling    · Redness    · Warmth, pain, or tenderness     Call your local emergency number (911 in the [address] [facility]) if:   · You feel lightheaded, short of breath, and have chest pain  · You cough up blood  Call your doctor if:   · Your arm or leg feels warm, tender, and painful  It may look swollen and red  · You have questions or concerns about your condition or care  Treatment for a DVT  may include any of the following:  · Blood thinners  help prevent blood clots  Clots can cause strokes, heart attacks, and death  The following are general safety guidelines to follow while you are taking a blood thinner:    ? Watch for bleeding and bruising while you take blood thinners  Watch for bleeding from your gums or nose  Watch for blood in your urine and bowel movements  Use a soft washcloth on your skin, and a soft toothbrush to brush your teeth  This can keep your skin and gums from bleeding  If you shave, use an electric shaver  Do not play contact sports  ? Tell your dentist and other healthcare providers that you take a blood thinner  Wear a bracelet or necklace that says you take this medicine  ? Do not start or stop any other medicines unless your healthcare provider tells you to  Many medicines cannot be used with blood thinners  ? Take your blood thinner exactly as prescribed by your healthcare provider   Do not skip does or take less than prescribed  Tell your provider right away if you forget to take your blood thinner, or if you take too much  ? Warfarin  is a blood thinner that you may need to take  The following are things you should be aware of if you take warfarin:     § Foods and medicines can affect the amount of warfarin in your blood  Do not make major changes to your diet while you take warfarin  Warfarin works best when you eat about the same amount of vitamin K every day  Vitamin K is found in green leafy vegetables and certain other foods  Ask for more information about what to eat when you are taking warfarin  § You will need to see your healthcare provider for follow-up visits when you are on warfarin  You will need regular blood tests  These tests are used to decide how much medicine you need  · Clot busters  are emergency medicines that work to dissolve blood clots  · A vena cava filter  may be placed inside your vena cava to treat your DVT  The vena cava is a large vein that brings blood from your lower body up to your heart  The filter may help trap pieces of a blood clot and prevent them from going into your lungs  · Surgery  called a thrombectomy may be done to remove the clot  A procedure called thrombolysis may instead be done to inject a clot buster that helps break the clot apart  Manage a DVT:   · Wear pressure stockings as directed  The stockings put pressure on your legs  This improves blood flow and helps prevent clots  Wear the stockings during the day  Do not wear them when you sleep  · Elevate your legs above the level of your heart  Elevate your legs when you sit or lie down, as often as you can  This will help decrease swelling and pain  Prop your legs on pillows or blankets to keep them elevated comfortably  Prevent a DVT:   · Exercise regularly to help increase your blood flow  Walking is a good low-impact exercise   Talk to your healthcare provider about the best exercise plan for you  · Change your body position or move around often  Move and stretch in your seat several times each hour if you travel by car or work at a desk  In an airplane, get up and walk every hour  Move your legs by tightening and releasing your leg muscles while sitting  You can move your legs while sitting by raising and lowering your heels  Keep your toes on the floor while you do this  You can also raise and lower your toes while keeping your heels on the floor  · Maintain a healthy weight  Ask your healthcare provider what a healthy weight is for you  Ask him or her to help you create a weight loss plan if you are overweight  · Do not smoke  Nicotine and other chemicals in cigarettes and cigars can damage blood vessels and make it more difficult to manage your DVT  Ask your healthcare provider for information if you currently smoke and need help to quit  E-cigarettes or smokeless tobacco still contain nicotine  Talk to your healthcare provider before you use these products  · Ask about birth control if you are a woman who takes the pill  A birth control pill increases the risk for PE in certain women  The risk is higher if you are also older than 35, smoke cigarettes, or have a blood clotting disorder  Talk to your healthcare provider about other ways to prevent pregnancy, such as a cervical cap or intrauterine device (IUD)  Follow up with your doctor or specialist as directed: You may need to come in regularly for scans to check for blood clots  Your blood may be checked to see how long it takes to clot  Your doctor or specialist will tell you if you need to have this test and how often to have it  Write down your questions so you remember to ask them during your visits  © Copyright [employer] 2021 Information is for End User's use only and may not be sold, redistributed or otherwise used for commercial purposes   All illustrations and images included in CareNotes® are the copyrighted property of [employer]  or [facility] Ricelizabeth Latesha   The above information is an  only  It is not intended as medical advice for individual conditions or treatments  Talk to your doctor, nurse or pharmacist before following any medical regimen to see if it is safe and effective for you  Heart Failure   WHAT YOU NEED TO KNOW:   Heart failure is a condition that does not allow your heart to fill or pump properly  Not enough oxygen in your blood gets to your organs and tissues  Fluid may not move through your body properly  Fluid builds up and causes swelling and trouble breathing  This is known as congestive heart failure  Heart failure may start in the left or right ventricle  Heart failure is often caused by damage or injury to your heart  The damage may be caused by other heart problems, diabetes, or high blood pressure  The damage may have also been caused by an infection  Heart failure is a long-term condition that tends to get worse over time  It is important to manage your health to improve your quality of life  DISCHARGE INSTRUCTIONS:   Call your local emergency number (911 in the [address] [place] [address],[address]) if:   · You have any of the following signs of a heart attack:      ? Squeezing, pressure, or pain in your chest    ? You may  also have any of the following:     § Discomfort or pain in your back, neck, jaw, stomach, or arm    § Shortness of breath    § Nausea or vomiting    § Lightheadedness or a sudden cold sweat      Call your doctor if:   · Your heartbeat is fast, slow, or uneven all the time  · You have symptoms of worsening heart failure:      ? Shortness of breath at rest, at night, or that is getting worse in any way    ? Weight gain of 3 or more pounds (1 4 kg) in a day, or more than your healthcare provider says is okay    ? More swelling in your legs or ankles    ? Abdominal pain or swelling    ? More coughing    ? Loss of appetite    ?  Feeling tired all the time    · You feel hopeless or depressed, or you have lost interest in things you used to enjoy  · You often feel worried or afraid  · You have questions or concerns about your condition or care  Medicines:   · Medicines  may be needed to help regulate your heart rhythm  You may also need medicine to lower your blood pressure, and to decrease extra fluids  · Take your medicine as directed  Contact your healthcare provider if you think your medicine is not helping or if you have side effects  Tell him of her if you are allergic to any medicine  Keep a list of the medicines, vitamins, and herbs you take  Include the amounts, and when and why you take them  Bring the list or the pill bottles to follow-up visits  Carry your medicine list with you in case of an emergency  Go to cardiac rehab if directed:  Cardiac rehab is a program run by specialists who will help you safely strengthen your heart  The program includes exercise, relaxation, stress management, and heart-healthy nutrition  Manage swelling from extra fluid:   · Elevate (raise) your legs above the level of your heart  This will help with fluid that builds up in your legs or ankles  Elevate your legs as often as possible during the day  Prop your legs on pillows or blankets to keep them elevated comfortably  Try not to stand for long periods of time during the day  Move around to keep your blood circulating  · Limit sodium (salt)  Ask how much sodium you can have each day  Your healthcare provider may give you a limit, such as 2,300 milligrams (mg) a day  Your provider or a dietitian can teach you how to read food labels for the number of mg in a food  He or she can also help you find ways to have less salt  For example, if you add salt to food as you cook, do not add more at the table  · Drink liquids as directed  You may need to limit the amount of liquid you drink within 24 hours   Your healthcare provider will tell you how much liquid to have and which liquids are best for you  He or she may tell you to limit liquid to 1 5 to 2 liters in a day  He or she will also tell you how often to drink liquid throughout the day  · Weigh yourself every morning  Use the same scale, in the same spot  Do this after you use the bathroom, but before you eat or drink  Wear the same type of clothing each time  Write down your weight and call your healthcare provider if you have a sudden weight gain  Swelling and weight gain are signs of fluid buildup  Manage heart failure: Your quality of life may improve with treatment and the following:  · Do not smoke  Nicotine and other chemicals in cigarettes and cigars can cause lung and heart damage  Ask your healthcare provider for information if you currently smoke and need help to quit  E-cigarettes or smokeless tobacco still contain nicotine  Talk to your healthcare provider before you use these products  · Do not drink alcohol or use illegal drugs  Alcohol and drugs can increase your risk for high blood pressure, diabetes, and coronary artery disease  · Eat heart-healthy foods  Heart-healthy foods include fruits, vegetables, lean meat (such as beef, chicken, or pork), and low-fat dairy products  Fatty fish such as salmon and tuna are also heart healthy  Other heart-healthy foods include walnuts, whole-grain breads, beans, and cooked beans  Replace butter and margarine with heart-healthy oils such as olive oil or canola oil  Your provider or a dietitian can help you create heart-healthy meal plans  · Manage any chronic health conditions you have  These include high blood pressure, diabetes, obesity, high cholesterol, metabolic syndrome, and COPD  You will have fewer symptoms if you manage these health conditions  Follow your healthcare provider's recommendations and follow up with him or her regularly  · Maintain a healthy weight    Being overweight can increase your risk for high blood pressure, diabetes, and coronary artery disease  These conditions can make your symptoms worse  Ask your healthcare provider how much you should weigh  Ask him or her to help you create a weight loss plan if you are overweight  · Stay active  Activity can help keep your symptoms from getting worse  Walking is a type of physical activity that helps maintain your strength and improve your mood  Physical activity also helps you manage your weight  Work with your healthcare provider to create an exercise plan that is right for you  · Get vaccines as directed  The flu and pneumonia can be severe for a person who has heart failure  Vaccines protect you from these infections  Get a flu shot every year as soon as it is recommended, usually in September or October  You may also need the pneumonia vaccine  Your healthcare provider can tell you if you need other vaccines, and when to get them  Follow up with your doctor within 7 days or as directed: You may need to return for other tests  You may need home health care  A healthcare provider will monitor your vital signs, weight, and make sure your medicines are working  Write down your questions so you remember to ask them during your visits  Join a support group:  Heart failure can be difficult to manage  It may be helpful to talk with others who have heart failure  You may learn how to better manage your condition or get emotional support  For more information:  · [address]  [place] , [place]   Phone: [phone]  Web Address: https://www strong [employer]/  [address] [facility] 2021 Information is for End User's use only and may not be sold, redistributed or otherwise used for commercial purposes  All illustrations and images included in CareNotes® are the copyrighted property of [employer]  or [facility] Herrera Charlton   The above information is an  only   It is not intended as medical advice for individual conditions or treatments  Talk to your doctor, nurse or pharmacist before following any medical regimen to see if it is safe and effective for you

## 2021-08-06 NOTE — ASSESSMENT & PLAN NOTE
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

## 2021-08-07 NOTE — ACP (ADVANCE CARE PLANNING)
Serious Illness Conversation    1  What is your understanding now of where you are with your illness? Prognostic Understanding: appropriate understanding of prognosis  Understand he has Stage IV Cancer  Wants to continue therapy     2  How much information about what is likely to be ahead with your illness would you like to have? Information: patient wants to be fully informed     3  What did you (clinician) communicate to the patient? Prognostic Communication: Uncertain - It can be difficult to predict what will happen with your illness  I hope you will continue to live well for a long time but Im worried that you could get sick quickly, and I think it is important to prepare for that possibility  4  If your health situation worsens, what are your most important goals? Goals: be at home, be physically comfortable, be emotionally at peace, be spiritually and emotionally at peace, be mentally aware, have my medical decisions respected, not be a burden, live as long as possible, no matter what     5  What are the biggest fears and worries about the future and your health? Fears/Worries: finances  Not having fiances and affairs in order     6  What abilities are so critical to your life that you cannot imagine living without them? Unacceptable Function: being unable to communicate effectively, being unconscious, being in pain or very uncomfortable     7  What gives you strength as you think about the future with your illness? Staying alive as long as possible with his wife     8  If you become sicker, how much are you willing to go through for the possibility of gaining more time? Be in the hospital: Yes    Be in the ICU: Yes    Be on a ventilator: Yes       9  How much does your proxy and family know about your priorities and wishes?   Discussion Discussion: wants clinician to talk with family  Wants his wife to help make decisions     [place] heard you say that continuing treatment is really important to you  Keeping that in mind, and what we know about your illness, I recommend that we continue treatments and schedule with [facility]  This will help us make sure that your treatment plans reflect whats important to you  How does this plan sound to you? I will do everything I can to help you through this    Patient verbalized understanding of the plan     I have spent 65 minutes speaking with my patient on advanced care planning today or during this visit     Advanced directives  Five Wishes: Patient does not have Five Wishes- would not like information

## 2021-08-10 ENCOUNTER — TELEPHONE (OUTPATIENT)
Dept: SURGICAL ONCOLOGY | Facility: CLINIC | Age: 86
End: 2021-08-10

## 2021-08-10 ENCOUNTER — TELEPHONE (OUTPATIENT)
Dept: CARDIOLOGY CLINIC | Facility: CLINIC | Age: 86
End: 2021-08-10

## 2021-08-10 ENCOUNTER — APPOINTMENT (OUTPATIENT)
Dept: LAB | Facility: MEDICAL CENTER | Age: 86
End: 2021-08-10
Payer: COMMERCIAL

## 2021-08-10 DIAGNOSIS — E87.6 HYPOKALEMIA: ICD-10-CM

## 2021-08-10 LAB
ANION GAP SERPL CALCULATED.3IONS-SCNC: 7 MMOL/L (ref 4–13)
BUN SERPL-MCNC: 65 MG/DL (ref 5–25)
CALCIUM SERPL-MCNC: 8.6 MG/DL (ref 8.3–10.1)
CHLORIDE SERPL-SCNC: 99 MMOL/L (ref 100–108)
CO2 SERPL-SCNC: 26 MMOL/L (ref 21–32)
CREAT SERPL-MCNC: 1.51 MG/DL (ref 0.6–1.3)
GFR SERPL CREATININE-BSD FRML MDRD: 42 ML/MIN/1.73SQ M
GLUCOSE SERPL-MCNC: 218 MG/DL (ref 65–140)
POTASSIUM SERPL-SCNC: 3.8 MMOL/L (ref 3.5–5.3)
SODIUM SERPL-SCNC: 132 MMOL/L (ref 136–145)

## 2021-08-10 PROCEDURE — 80048 BASIC METABOLIC PNL TOTAL CA: CPT

## 2021-08-10 PROCEDURE — 36415 COLL VENOUS BLD VENIPUNCTURE: CPT

## 2021-08-10 NOTE — TELEPHONE ENCOUNTER
Left voice message for patient to call office back to confirm or r/s appointment scheduled for 08/13/2021 at 8am with Dr Carmen Kohler  2nd attempt (1st being 08/09/2021)

## 2021-08-10 NOTE — TELEPHONE ENCOUNTER
New Patient Encounter    New Patient Intake Form   Patient Details:  Herve Antony  1935  [id number]    Background Information:  [facility] [place] starts by opening a telephone encounter and gathering the following information   Who is calling to schedule? If not self, relationship to patient? Physician Office   Referring Provider Dr Rachid Flores  pcp   What is the diagnosis? Metastatic lung cancer   Is this Cancer or Non-Cancer? Cancer   Is this diagnosis confirmed? Yes   When was the diagnosis? 5/2021   Is there a confirmed diagnosis from a biopsy/tissue reviewed by pathology? Yes   Were outside slides requested? No   Is patient aware of diagnosis? Yes   Is there a personal history and what kind? Did Not Speak to Patient / Office Scheduled   Is there a family history and what kind? Did Not Speak to Patient / Office Scheduled   Reason for visit? History Of   Have you had any imaging or labs done? If so: when, where? Yes  Ct 7/2021  [facility]   Are records in Context Relevant? yes   If patient has a prior history of cancer were old records obtained? NA   Was the patient told to bring a disk? No   Does the patient smoke or Vape? No   If yes, how many packs or cartridges per day? Scheduling Information:   Preferred [place]:  Any     Are there any dates/time the patient cannot be seen? Miscellaneous: Physician feels pt should be on hospice  Wife wants one more opinion  Looking for ASAP appt  Sending to Nurse Navigator for scheduling  After completing the above information, please route to Financial Counselor and the appropriate Nurse Navigator for review

## 2021-08-11 ENCOUNTER — DOCUMENTATION (OUTPATIENT)
Dept: SOCIAL WORK | Facility: HOSPITAL | Age: 86
End: 2021-08-11

## 2021-08-11 ENCOUNTER — TELEPHONE (OUTPATIENT)
Dept: CARDIOLOGY CLINIC | Facility: CLINIC | Age: 86
End: 2021-08-11

## 2021-08-11 NOTE — PROGRESS NOTES
Home PT eval    Primary focus of home health care Cardiopulmonary  Patient stated goals of care "Im not sure yet I just want to see how things go" Patient enjoys hunting and fishing  Anticipated visit pattern and next visit date 1w1 2w4 1w1  Significant clinical findings RPE of 10 with min exertion with ambulation  Needs follow up physician appointments scheduled Wife has schedule  Potential barriers to goal achievement fatigue and sob  Other pertinent information pitting edema in b les    Thank you for allowing us to participate in the care of your patient        GREGORY FloresT

## 2021-08-11 NOTE — PROGRESS NOTES
[facility] - Hematology & Medical Oncology  Outpatient Visit Encounter Note      Sanford Duran 82 y o  male [id number] Date:  8/13/2021      Luke Bustos is a 82 y o  here for new consultation with me today  He presents for a second opinion regarding mNSCLC management  He is currently established with Dr Aleah Summers at [facility]  In review of the chart and talking with the patient, he was last seen by his oncologist on 7/30/2021  His cancer story started when he had persistent bronchitis with RUL infiltrate  A CT Chest done after on 5/11/21 showed bulky RUL mass  This then led to further evaluation including diagnostic imaging and bronch with pathology being read:    "The tumor shows morphologic features of adenocarcinoma in cytology   smears and cell block preparation, but completely lacking reactivity   for TTF1 and Napsin A   Squamous differentiation is demonstrated by   immunohistochemical satins for P63, P40 and CK5/6   The case is   reviewed by cytopathologists within the department  Channie Pancoast is that   this represents a poorly differentiated non-small cell carcinoma, with   adenocarcinoma and mixed squamous differentiation  " - Dr Kim Oakes on 7/9/21  PETCT 6/3/21 showed:  1  Findings are consistent with metabolically active tumor within a bulky right   upper lobe mass with extension to the right hilum as described above   2  There is evidence of metabolically active mediastinal adenopathy as described   above   3  Findings are consistent with several metabolically active osseous metastasis   within the axial skeleton as described above     MRI Brain 6/8/21:  1   Significant motion artifacts present  2   6 mm enhancing lesion in the left postcentral gyrus, concerning for a   metastatic lesion  Mild edema  No significant mass effect     3   Extra-axial enhancing lesion measuring 1 2 x 0 5 cm along the anterior falx,   possibly a meningioma versus a dural metastasis  No significant mass effect or   edema  4   6 mm focal enhancement along the superior right cerebellum/tentorium,   possibly an artifact versus an enhancing lesion  His Foundation CDx NGS testing showed PDL1 TPS at 95% and KRAS G12C positivity  Thereafter, he was recommended palliative radiation therapy to the RUL primary tumor, the T12 area of the spine, and to his solitary L parietal brain met by Dr Sawyer Costa of [address] [place] [address]  He was also recommended to undergo Gamma Knife treatment of his solitary brain met in the near future per notes  He was then meant to be started on front-line palliative intent therapy with Carboplatin + Abraxane + Pembrolizumab and after the NGS finalized his TPS, he was switched over to single-agent Pembrolizumab  Based on records and per patient, he got 2-3 cycles of Keytruda  During his course of anti-neoplastic therapy, he has been admitted for different illnesses in the hospital  His most recent was at [facility]  Today, he is here with his wife  He is in a wheelchair and tells me that he has "gone down" since he started cancer therapy  He says that his quality of life is not good and he is dependent on his wife for everything at home  He says that he cannot walk about by himeslf and uses a walker and sitting support at home as well  He spends all his time in the couch or in the bed  He feels fatigued and exhausted  He says that "I know I am not making it" and told me that he wants to stop cancer therapy and enter hospice  He denies headaches, fevers/chills, nausea/vomiting, chest pain or diarrhea  He says that he feels that his Keytruda therapy has not helped with his quality of life and is unhappy about it  I have reviewed the relevant past medical, surgical, social and family history  I have also reviewed allergies and medications for this patient  Review of Systems  Review of Systems   All other systems reviewed and are negative  OBJECTIVE     Physical Exam  Vitals:    08/12/21 1446   BP: 119/50   BP Location: Right arm   Patient Position: Sitting   Cuff Size: Adult   Pulse: (!) 106   Resp: 17   Temp: 99 5 °F (37 5 °C)   SpO2: 93%   Weight: 75 3 kg (166 lb)   Height: 5' 8" (1 727 m)       Physical Exam  Vitals reviewed  Constitutional:       General: He is not in acute distress  Appearance: He is ill-appearing  He is not toxic-appearing or diaphoretic  Comments: Weak, frail and fatigued look  ECOG 4   HENT:      Head: Normocephalic and atraumatic  Eyes:      Conjunctiva/sclera: Conjunctivae normal    Cardiovascular:      Rate and Rhythm: Normal rate  Pulmonary:      Effort: Pulmonary effort is normal  No respiratory distress  Abdominal:      General: There is no distension  Musculoskeletal:         General: No swelling  Cervical back: Normal range of motion  Comments: Limited ROM and limited ability to participate in exam   Neurological:      General: No focal deficit present  Mental Status: He is alert and oriented to person, place, and time  Comments: AAOx3   Psychiatric:         Behavior: Behavior normal          Thought Content: Thought content normal          Judgment: Judgment normal           Imaging  Relevant imaging reviewed in chart    Labs  Relevant labs reviewed in chart   ASSESSMENT & PLAN      Diagnosis ICD-10-CM Associated Orders   1  Metastatic non-small cell lung cancer ([facility] )  C34 90 Ambulatory referral to hospice   2  Bone metastases ([facility] )  C79 51    3  Brain metastasis ([facility] )  C79 31    4  Encounter for antineoplastic chemotherapy  Z51 11    5  Deep vein thrombosis (DVT) of popliteal vein of right lower extremity, unspecified chronicity (HCC)  I82 431    6  Palliative care encounter  Z51 5 Ambulatory referral to hospice   7   Severe protein-calorie malnutrition [facility])  E42          82 y o  male with multiple co-morbid conditions diagnosed with de anatoly mNSCLC with adenosquamous features in June-July 2021  He is currently undergoing palliative intent anti-neoplastic therapy at [facility]  He is here for a second opinion  · Discussion  · I reviewed his medical chart - which includes office visit notes, pathology report and treatment decisions amongst others - which is detailed and summarized above  · Despite undergoing palliative intent RT and systemic therapy, there is worsening of clinical status and functional status  At this time, due to his ECOG 4 status, risks outweigh benefits of systemic therapy  Hence, hospice consideration and evaluation is indicated and appropriate  · Upon discussion about systemic therapy and his cancer care journey so far, patient told me that he wants to have hospice evaluation and does not want to undergo systemic therapy again  · With his clear understanding/outlining of goals of care and meaning of hospice/end of life care, I placed hospice referral as I respect patient wishes and want to support best practice measures  Follow Up   None indicated with me   He told me that he does not wish for my office to communicate his decision with Dr Luis Enrique Bartlett, his treating oncologist until now  All questions were answered to the patient's satisfaction during this encounter  They appreciated and thanked me for spending time with them  The patient knows the contact information for our office and know to reach out for any relevant concerns related to this encounter  For all other listed problems and medical diagnosis in his chart - they are managed by PCP and/or other specialists, which patient acknowledges  Dr Marty Cam MD  Hematology & Medical Oncology

## 2021-08-11 NOTE — TELEPHONE ENCOUNTER
Left voice message on [employer] (spouse of patient) cell phone  Patient has not called back to confirm appointment scheduled for 08/13/2021 at 8am with Dr Yasemin Garcia

## 2021-08-12 ENCOUNTER — TELEPHONE (OUTPATIENT)
Dept: HEMATOLOGY ONCOLOGY | Facility: CLINIC | Age: 86
End: 2021-08-12

## 2021-08-12 ENCOUNTER — CONSULT (OUTPATIENT)
Dept: HEMATOLOGY ONCOLOGY | Facility: CLINIC | Age: 86
End: 2021-08-12
Payer: COMMERCIAL

## 2021-08-12 VITALS
SYSTOLIC BLOOD PRESSURE: 119 MMHG | OXYGEN SATURATION: 93 % | HEIGHT: 68 IN | RESPIRATION RATE: 17 BRPM | HEART RATE: 106 BPM | DIASTOLIC BLOOD PRESSURE: 50 MMHG | BODY MASS INDEX: 25.16 KG/M2 | TEMPERATURE: 99.5 F | WEIGHT: 166 LBS

## 2021-08-12 DIAGNOSIS — C79.31 BRAIN METASTASIS (HCC): ICD-10-CM

## 2021-08-12 DIAGNOSIS — Z51.5 PALLIATIVE CARE ENCOUNTER: ICD-10-CM

## 2021-08-12 DIAGNOSIS — I82.431 DEEP VEIN THROMBOSIS (DVT) OF POPLITEAL VEIN OF RIGHT LOWER EXTREMITY, UNSPECIFIED CHRONICITY (HCC): ICD-10-CM

## 2021-08-12 DIAGNOSIS — C79.51 BONE METASTASES (HCC): ICD-10-CM

## 2021-08-12 DIAGNOSIS — Z51.11 ENCOUNTER FOR ANTINEOPLASTIC CHEMOTHERAPY: ICD-10-CM

## 2021-08-12 DIAGNOSIS — E43 SEVERE PROTEIN-CALORIE MALNUTRITION (HCC): ICD-10-CM

## 2021-08-12 DIAGNOSIS — C34.90 METASTATIC NON-SMALL CELL LUNG CANCER (HCC): Primary | ICD-10-CM

## 2021-08-12 PROCEDURE — 99204 OFFICE O/P NEW MOD 45 MIN: CPT | Performed by: INTERNAL MEDICINE

## 2021-08-12 NOTE — TELEPHONE ENCOUNTER
Héctor Rodríguez from the office of  Dr Zoila Roger (pcp)  called to advise they will need over all the records they have on the patient in a effort to help if records are not received from [facility]

## 2021-08-12 NOTE — TELEPHONE ENCOUNTER
Patient's daughter Rhonda Dhillon is calling to have a medical release form faxed to her parents at Fax # [phone]  Once received they will fax signed medical release form to Dr Jackie Higgins Office(fax # [phone])   to have records sent to [facility] office where Dr Caroline Robles is today    (PLEASE WRITE [place] FAX NUMBER AND TO FAX RECORDS URGENTLY TO [place] FAX NUMBER on medical release form)

## 2021-08-12 NOTE — TELEPHONE ENCOUNTER
Spoke to Radha Rock at Dr Floyce Skiff office, [address] [place] [address] [address] Hematology Oncology, per Dr Shelton Minor request   Informed her Dr Satnam Davis needs to know the regimen/dates of patient's treatment as it is not available ot care everywhere  Per Radha Rock, there is no release of records form signed by patient therefore, they are unable to provide any information  LVM for Obdulia Yu to contact their office to get this completed  Informed Dr Satnam Davis of the above

## 2021-08-12 NOTE — TELEPHONE ENCOUNTER
Transferring Call to Another Office   Who is [facility]   Wants to speak with Laila    Transferred to [employer]  transferred

## 2021-08-13 PROBLEM — Z51.5 PALLIATIVE CARE ENCOUNTER: Status: ACTIVE | Noted: 2021-08-13

## 2021-08-17 ENCOUNTER — TELEPHONE (OUTPATIENT)
Dept: HEMATOLOGY ONCOLOGY | Facility: CLINIC | Age: 86
End: 2021-08-17

## 2021-08-17 ENCOUNTER — PATIENT OUTREACH (OUTPATIENT)
Dept: CASE MANAGEMENT | Facility: HOSPITAL | Age: 86
End: 2021-08-17

## 2021-08-17 NOTE — TELEPHONE ENCOUNTER
I reached out to [address] wife, Cyrus Sprague after Briseida Banks brought to my attention their experience they had in the Medical Oncology visit on 8/12/21  Carminenathan Darcie expressed her concerns during the visit, but was please with the services Briseida Banks gave her during a difficult time  I also expressed my apologies and reassured her that her  is in great hands with the hospice team  She appreciated that she was outreached and that her concerns were heard  I gave her my direct line for anything she may need

## 2021-08-17 NOTE — PROGRESS NOTES
Oncology LSW received referral yesterday from medical oncology  LSW reviewed chart and noted pt saw med-on on 8/12  Mr Rubin Banks has a h/o stage IV NSCLC and was receiving care through [facility]  He had a hospital stay last month and decided to transfer care to [facility]  At his consultation on 8/12, a referral was placed to [facility], per pt's wishes and desire to stop aggressive cancer treatments at this time, as per documentation  LSW phoned pt's home and spoke with wife, Mrs Rubin Banks  She provided her 's history of his care at [facility], and explained after some concerns they decided to transfer care  Unfortunately, she described additional concerns after medical oncology visit on 8/12  LSW provided active and supportive listening  Offered to take her concerns to oncology administration and she is agreeable  Mrs Rubin Banks explained her  signed on to [facility] and a nurse will be out today  Her  decided he cannot tolerate aggressive cancer treatments anymore and decided to pursue comfort cares  LSW explained they will have good support from the hospice team    Mrs Rubin Banks denied any other needs at this time  She expressed appreciation for call and support  LSW emailed patient/family concerns to oncology SW supervisor

## 2021-08-19 ENCOUNTER — NURSING HOME VISIT (OUTPATIENT)
Dept: GERIATRICS | Facility: OTHER | Age: 86
End: 2021-08-19
Payer: COMMERCIAL

## 2021-08-19 DIAGNOSIS — J45.909 ASTHMA, UNSPECIFIED ASTHMA SEVERITY, UNSPECIFIED WHETHER COMPLICATED, UNSPECIFIED WHETHER PERSISTENT: ICD-10-CM

## 2021-08-19 DIAGNOSIS — C79.31 BRAIN METASTASIS (HCC): ICD-10-CM

## 2021-08-19 DIAGNOSIS — R06.89 ACUTE RESPIRATORY INSUFFICIENCY: ICD-10-CM

## 2021-08-19 DIAGNOSIS — C34.90 METASTATIC NON-SMALL CELL LUNG CANCER (HCC): Primary | ICD-10-CM

## 2021-08-19 DIAGNOSIS — I82.431 DEEP VEIN THROMBOSIS (DVT) OF POPLITEAL VEIN OF RIGHT LOWER EXTREMITY, UNSPECIFIED CHRONICITY (HCC): ICD-10-CM

## 2021-08-19 DIAGNOSIS — I50.33 ACUTE ON CHRONIC DIASTOLIC (CONGESTIVE) HEART FAILURE (HCC): ICD-10-CM

## 2021-08-19 DIAGNOSIS — C79.51 BONE METASTASES (HCC): ICD-10-CM

## 2021-08-19 PROCEDURE — 99316 NF DSCHRG MGMT 30 MIN+: CPT | Performed by: NURSE PRACTITIONER

## 2021-08-19 NOTE — PROGRESS NOTES
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 and under hospice care with [facility] services seen and examined per nursing request today for large amount of hemoptysis  Nursing reports that the patient was being turned to use the bed pan when he started gagging and bringing up a large amount of blood and blood clots  Upon examination, patient was bringing up large amount of blood and blood clots  He was experiencing agonal breathing for about 5 minutes and  at 1854  His wife and son were in the building and were notified of patient's death        The following portions of the patient's history were reviewed and updated as appropriate: allergies, current medications, past family history, past medical history, past social history, past surgical history and problem list     Review of Systems     Unable to obtain, patient unresponsive    History     Past Medical History:   Diagnosis Date    Arthritis     Asthma     Chronic pain disorder     back    Colon polyp     Disease of thyroid gland     hypo    Dystonic tremor     Essential tremor     Hyperlipidemia     Hypertension     Pneumonia     x2    Stroke ([address] )     unknown and no residual     Past Surgical History:   Procedure Laterality Date    BACK SURGERY      CATARACT EXTRACTION Left     COLONOSCOPY      JOINT REPLACEMENT Left     reverse shoulder    MOHS RECONSTRUCTION N/A 6/3/2020    Procedure: REPAIR MOHS OF THE NOSE;  Surgeon: Matias Durham MD;  Location: [address] [place] [address] OR;  Service: Plastics    [place] DELAY/SECTN FLAP LID,NOS,EAR,LIP N/A 7/15/2020    Procedure: DIVIDE NASAL FLAP;  Surgeon: Benita Barahona MD;  Location: [facility] MAIN OR;  Service: Plastics    ROTATOR CUFF REPAIR      SPINE SURGERY      TONSILLECTOMY       Family History   Problem Relation Age of Onset    No Known Problems Mother     No Known Problems Father      Social History     Socioeconomic History    Marital status: Unknown     Spouse name: Not on file    Number of children: Not on file    Years of education: Not on file    Highest education level: Not on file   Occupational History    Not on file   Tobacco Use    Smoking status: Former Smoker     Types: Cigarettes     Quit date: 1994     Years since quittin 3    Smokeless tobacco: Current User     Types: Chew    Tobacco comment: currently uses chewing tobacco   Vaping Use    Vaping Use: Never used   Substance and Sexual Activity    Alcohol use: Yes     Comment: < 1 drink a month    Drug use: Never    Sexual activity: Not Currently   Other Topics Concern    Not on file   Social History Narrative    DOES NOT CONSUME CAFFEINE     Social Determinants of Health     Financial Resource Strain:     Difficulty of Paying Living Expenses:    Food Insecurity:     Worried About Running Out of Food in the Last Year:     Ran Out of Food in the Last Year:    Transportation Needs:     Lack of Transportation (Medical):  Lack of Transportation (Non-Medical):    Physical Activity:     Days of Exercise per Week:     Minutes of Exercise per Session:    Stress:     Feeling of Stress :    Social Connections:     Frequency of Communication with Friends and Family:     Frequency of Social Gatherings with Friends and Family:     Attends [employer] Services:     Active Member of Clubs or Organizations:     Attends Club or Organization Meetings:     Marital Status:    Intimate Partner Violence:     Fear of Current or Ex-Partner:     Emotionally Abused:     Physically Abused:     Sexually Abused:       Allergies   Allergen Reactions    Lisinopril      Swelling of tongue    Sulfa Antibiotics Other (See Comments)     pancreatitis    Other reaction(s): Other (Please comment)  Pancreas destroy itself    Zestoretic [Lisinopril-Hydrochlorothiazide]      Pancreatitis       Objective    Assessment upon arrival before time of death:  General: Acute distress   Oral: Oropharynx positive for a large amount of blood and blood clots  CV: Weak heart rate  Pulmonary: Diminished breath sounds, poor effort  Abdominal:BS absent  Extremities: No edema,   Skin: Warm, Dry, pale skin  Neurological: Unreponsive      Current Medications     Current Medications Reviewed and updated in Nursing Home EMR      Problem list     · Metastatic non-small cell lung cancer  · Brain Metastasis  · Deep vein thrombosis  · Acute on chronic diastolic congestive failure  · Hypertension  · Asthma  · Acute respiratory insufficiency    Anuja [address] [facility]  8/19/2021     TIME SPENT: GREATER THAN 30 MINUTES

## 2021-08-19 NOTE — ASSESSMENT & PLAN NOTE
· Patient with history of metastatic non-small cell lung cancer   · Patient under hospice services  · Patient  at [phone] today

## 2022-06-17 NOTE — PRE-PROCEDURE INSTRUCTIONS
Pre-Surgery Instructions:   Medication Instructions    albuterol (PROVENTIL HFA,VENTOLIN HFA) 90 mcg/act inhaler Instructed patient per Anesthesia Guidelines   amLODIPine (NORVASC) 10 mg tablet Instructed patient per Anesthesia Guidelines   carvedilol (COREG) 6 25 mg tablet Instructed patient per Anesthesia Guidelines   FLOVENT DISKUS 250 MCG/BLIST AEPB Instructed patient per Anesthesia Guidelines   primidone (MYSOLINE) 50 mg tablet Instructed patient per Anesthesia Guidelines 
17

## 2022-12-06 NOTE — ASSESSMENT & PLAN NOTE
Home regimen carvedilol 3 125 mg twice a day  Demadex 10 mg daily    Discontinue amlodipine, discontinued clonidinem Demadex reduced to 20 mg no hernia

## (undated) DEVICE — SUT VICRYL 4-0 P-3 18 IN J494G

## (undated) DEVICE — NEEDLE 25G X 1 1/2

## (undated) DEVICE — TUBING SUCTION 5MM X 12 FT

## (undated) DEVICE — CRADLE EXTREMITY UNIVERSAL CONTOURED

## (undated) DEVICE — SPONGE 4 X 4 XRAY 16 PLY STRL LF RFD

## (undated) DEVICE — SUT ETHILON 4-0 P-S 18 IN 699H

## (undated) DEVICE — 1820 FOAM BLOCK NEEDLE COUNTER: Brand: DEVON

## (undated) DEVICE — LIGHT GLOVE GREEN

## (undated) DEVICE — SKIN MARKER DUAL TIP WITH RULER CAP, FLEXIBLE RULER AND LABELS: Brand: DEVON

## (undated) DEVICE — ASTOUND STANDARD SURGICAL GOWN, XL: Brand: CONVERTORS

## (undated) DEVICE — STERILE POLYISOPRENE POWDER-FREE SURGICAL GLOVES: Brand: PROTEXIS

## (undated) DEVICE — 4-PORT MANIFOLD: Brand: NEPTUNE 2

## (undated) DEVICE — STERILE POLYISOPRENE POWDER-FREE SURGICAL GLOVES WITH EMOLLIENT COATING: Brand: PROTEXIS

## (undated) DEVICE — SURGICEL 2 X 3

## (undated) DEVICE — SYRINGE 30ML LL

## (undated) DEVICE — SUT VICRYL 4-0 PS-2 18 IN J496G

## (undated) DEVICE — SYRINGE 10ML LL CONTROL TOP

## (undated) DEVICE — TIBURON SPLIT SHEET: Brand: CONVERTORS

## (undated) DEVICE — SUT ETHILON 4-0 PS-2 18 IN 1667H

## (undated) DEVICE — BASIC PACK: Brand: CONVERTORS

## (undated) DEVICE — INTENDED FOR TISSUE SEPARATION, AND OTHER PROCEDURES THAT REQUIRE A SHARP SURGICAL BLADE TO PUNCTURE OR CUT.: Brand: BARD-PARKER SAFETY BLADES SIZE 15, STERILE

## (undated) DEVICE — NEEDLE BLUNT 18 G X 1 1/2IN

## (undated) DEVICE — STERILE SYNTHETIC NEOPRENE POWDER-FREE SURGICAL GLOVES WITH NITRILE COATING, SMOOTH FINISH, STRAIGHT FINGER: Brand: PROTEXIS

## (undated) DEVICE — CABLE BIPOLAR DISP MEGADYNE